# Patient Record
Sex: FEMALE | Race: WHITE | NOT HISPANIC OR LATINO | Employment: OTHER | URBAN - METROPOLITAN AREA
[De-identification: names, ages, dates, MRNs, and addresses within clinical notes are randomized per-mention and may not be internally consistent; named-entity substitution may affect disease eponyms.]

---

## 2022-04-12 ENCOUNTER — HOSPITAL ENCOUNTER (OUTPATIENT)
Facility: HOSPITAL | Age: 87
Setting detail: OBSERVATION
Discharge: HOME | End: 2022-04-13
Attending: EMERGENCY MEDICINE | Admitting: EMERGENCY MEDICINE

## 2022-04-12 ENCOUNTER — APPOINTMENT (OUTPATIENT)
Dept: GENERAL RADIOLOGY | Facility: HOSPITAL | Age: 87
End: 2022-04-12

## 2022-04-12 DIAGNOSIS — R06.02 SHORTNESS OF BREATH: Primary | ICD-10-CM

## 2022-04-12 LAB
ALBUMIN SERPL-MCNC: 4.4 G/DL (ref 3.5–5.2)
ALBUMIN/GLOB SERPL: 1.8 G/DL
ALP SERPL-CCNC: 71 U/L (ref 39–117)
ALT SERPL W P-5'-P-CCNC: 15 U/L (ref 1–33)
ANION GAP SERPL CALCULATED.3IONS-SCNC: 12 MMOL/L (ref 5–15)
APTT PPP: 27.9 SECONDS (ref 24–31)
AST SERPL-CCNC: 15 U/L (ref 1–32)
B PARAPERT DNA SPEC QL NAA+PROBE: NOT DETECTED
B PERT DNA SPEC QL NAA+PROBE: NOT DETECTED
BASOPHILS # BLD AUTO: 0 10*3/MM3 (ref 0–0.2)
BASOPHILS NFR BLD AUTO: 0.3 % (ref 0–1.5)
BILIRUB SERPL-MCNC: 0.8 MG/DL (ref 0–1.2)
BUN SERPL-MCNC: 22 MG/DL (ref 8–23)
BUN/CREAT SERPL: 27.5 (ref 7–25)
C PNEUM DNA NPH QL NAA+NON-PROBE: NOT DETECTED
CALCIUM SPEC-SCNC: 9.4 MG/DL (ref 8.6–10.5)
CHLORIDE SERPL-SCNC: 104 MMOL/L (ref 98–107)
CO2 SERPL-SCNC: 22 MMOL/L (ref 22–29)
CREAT SERPL-MCNC: 0.8 MG/DL (ref 0.57–1)
DEPRECATED RDW RBC AUTO: 50.3 FL (ref 37–54)
EGFRCR SERPLBLD CKD-EPI 2021: 72.8 ML/MIN/1.73
EOSINOPHIL # BLD AUTO: 0 10*3/MM3 (ref 0–0.4)
EOSINOPHIL NFR BLD AUTO: 0.2 % (ref 0.3–6.2)
ERYTHROCYTE [DISTWIDTH] IN BLOOD BY AUTOMATED COUNT: 15.2 % (ref 12.3–15.4)
FLUAV SUBTYP SPEC NAA+PROBE: NOT DETECTED
FLUBV RNA ISLT QL NAA+PROBE: NOT DETECTED
GIANT PLATELETS: NORMAL
GLOBULIN UR ELPH-MCNC: 2.5 GM/DL
GLUCOSE SERPL-MCNC: 97 MG/DL (ref 65–99)
HADV DNA SPEC NAA+PROBE: NOT DETECTED
HCOV 229E RNA SPEC QL NAA+PROBE: NOT DETECTED
HCOV HKU1 RNA SPEC QL NAA+PROBE: NOT DETECTED
HCOV NL63 RNA SPEC QL NAA+PROBE: NOT DETECTED
HCOV OC43 RNA SPEC QL NAA+PROBE: NOT DETECTED
HCT VFR BLD AUTO: 33 % (ref 34–46.6)
HGB BLD-MCNC: 11.4 G/DL (ref 12–15.9)
HMPV RNA NPH QL NAA+NON-PROBE: NOT DETECTED
HPIV1 RNA ISLT QL NAA+PROBE: NOT DETECTED
HPIV2 RNA SPEC QL NAA+PROBE: NOT DETECTED
HPIV3 RNA NPH QL NAA+PROBE: NOT DETECTED
HPIV4 P GENE NPH QL NAA+PROBE: NOT DETECTED
INR PPP: 1.03 (ref 0.93–1.1)
LARGE PLATELETS: NORMAL
LYMPHOCYTES # BLD AUTO: 1.4 10*3/MM3 (ref 0.7–3.1)
LYMPHOCYTES NFR BLD AUTO: 50 % (ref 19.6–45.3)
M PNEUMO IGG SER IA-ACNC: NOT DETECTED
MCH RBC QN AUTO: 32.9 PG (ref 26.6–33)
MCHC RBC AUTO-ENTMCNC: 34.4 G/DL (ref 31.5–35.7)
MCV RBC AUTO: 95.6 FL (ref 79–97)
MONOCYTES # BLD AUTO: 0.4 10*3/MM3 (ref 0.1–0.9)
MONOCYTES NFR BLD AUTO: 14.2 % (ref 5–12)
NEUTROPHILS NFR BLD AUTO: 1 10*3/MM3 (ref 1.7–7)
NEUTROPHILS NFR BLD AUTO: 35.3 % (ref 42.7–76)
NRBC BLD AUTO-RTO: 0.2 /100 WBC (ref 0–0.2)
NT-PROBNP SERPL-MCNC: 138.8 PG/ML (ref 0–1800)
PLATELET # BLD AUTO: 64 10*3/MM3 (ref 140–450)
PMV BLD AUTO: 12.2 FL (ref 6–12)
POTASSIUM SERPL-SCNC: 4.2 MMOL/L (ref 3.5–5.2)
PROT SERPL-MCNC: 6.9 G/DL (ref 6–8.5)
PROTHROMBIN TIME: 10.6 SECONDS (ref 9.6–11.7)
QT INTERVAL: 411 MS
RBC # BLD AUTO: 3.45 10*6/MM3 (ref 3.77–5.28)
RBC MORPH BLD: NORMAL
RHINOVIRUS RNA SPEC NAA+PROBE: NOT DETECTED
RSV RNA NPH QL NAA+NON-PROBE: NOT DETECTED
SARS-COV-2 RNA NPH QL NAA+NON-PROBE: NOT DETECTED
SARS-COV-2 RNA PNL SPEC NAA+PROBE: NOT DETECTED
SMALL PLATELETS BLD QL SMEAR: NORMAL
SODIUM SERPL-SCNC: 138 MMOL/L (ref 136–145)
TROPONIN T SERPL-MCNC: <0.01 NG/ML (ref 0–0.03)
WBC MORPH BLD: NORMAL
WBC NRBC COR # BLD: 2.8 10*3/MM3 (ref 3.4–10.8)

## 2022-04-12 PROCEDURE — C9803 HOPD COVID-19 SPEC COLLECT: HCPCS

## 2022-04-12 PROCEDURE — 93005 ELECTROCARDIOGRAM TRACING: CPT | Performed by: EMERGENCY MEDICINE

## 2022-04-12 PROCEDURE — G0378 HOSPITAL OBSERVATION PER HR: HCPCS

## 2022-04-12 PROCEDURE — 71045 X-RAY EXAM CHEST 1 VIEW: CPT

## 2022-04-12 PROCEDURE — U0003 INFECTIOUS AGENT DETECTION BY NUCLEIC ACID (DNA OR RNA); SEVERE ACUTE RESPIRATORY SYNDROME CORONAVIRUS 2 (SARS-COV-2) (CORONAVIRUS DISEASE [COVID-19]), AMPLIFIED PROBE TECHNIQUE, MAKING USE OF HIGH THROUGHPUT TECHNOLOGIES AS DESCRIBED BY CMS-2020-01-R: HCPCS | Performed by: EMERGENCY MEDICINE

## 2022-04-12 PROCEDURE — 83880 ASSAY OF NATRIURETIC PEPTIDE: CPT | Performed by: NURSE PRACTITIONER

## 2022-04-12 PROCEDURE — 85025 COMPLETE CBC W/AUTO DIFF WBC: CPT | Performed by: NURSE PRACTITIONER

## 2022-04-12 PROCEDURE — 85610 PROTHROMBIN TIME: CPT | Performed by: NURSE PRACTITIONER

## 2022-04-12 PROCEDURE — 84484 ASSAY OF TROPONIN QUANT: CPT | Performed by: NURSE PRACTITIONER

## 2022-04-12 PROCEDURE — 85007 BL SMEAR W/DIFF WBC COUNT: CPT | Performed by: NURSE PRACTITIONER

## 2022-04-12 PROCEDURE — 85730 THROMBOPLASTIN TIME PARTIAL: CPT | Performed by: NURSE PRACTITIONER

## 2022-04-12 PROCEDURE — 99284 EMERGENCY DEPT VISIT MOD MDM: CPT

## 2022-04-12 PROCEDURE — 0202U NFCT DS 22 TRGT SARS-COV-2: CPT | Performed by: NURSE PRACTITIONER

## 2022-04-12 PROCEDURE — U0005 INFEC AGEN DETEC AMPLI PROBE: HCPCS | Performed by: EMERGENCY MEDICINE

## 2022-04-12 PROCEDURE — 99204 OFFICE O/P NEW MOD 45 MIN: CPT | Performed by: INTERNAL MEDICINE

## 2022-04-12 PROCEDURE — 80053 COMPREHEN METABOLIC PANEL: CPT | Performed by: NURSE PRACTITIONER

## 2022-04-12 RX ORDER — ATORVASTATIN CALCIUM 20 MG/1
20 TABLET, FILM COATED ORAL NIGHTLY
Status: DISCONTINUED | OUTPATIENT
Start: 2022-04-12 | End: 2022-04-13 | Stop reason: HOSPADM

## 2022-04-12 RX ORDER — ATORVASTATIN CALCIUM 20 MG/1
20 TABLET, FILM COATED ORAL DAILY
COMMUNITY
End: 2022-11-28 | Stop reason: SDUPTHER

## 2022-04-12 RX ORDER — VALSARTAN 80 MG/1
40 TABLET ORAL DAILY
Status: DISCONTINUED | OUTPATIENT
Start: 2022-04-13 | End: 2022-04-13 | Stop reason: HOSPADM

## 2022-04-12 RX ORDER — CARVEDILOL 6.25 MG/1
6.25 TABLET ORAL 2 TIMES DAILY WITH MEALS
Status: DISCONTINUED | OUTPATIENT
Start: 2022-04-12 | End: 2022-04-13 | Stop reason: HOSPADM

## 2022-04-12 RX ORDER — CLOPIDOGREL BISULFATE 75 MG/1
300 TABLET ORAL ONCE
Status: COMPLETED | OUTPATIENT
Start: 2022-04-12 | End: 2022-04-12

## 2022-04-12 RX ORDER — SPIRONOLACTONE 25 MG/1
25 TABLET ORAL AS NEEDED
COMMUNITY
End: 2022-09-14 | Stop reason: SDUPTHER

## 2022-04-12 RX ORDER — FLUOROMETHOLONE 0.1 %
1 SUSPENSION, DROPS(FINAL DOSAGE FORM)(ML) OPHTHALMIC (EYE) 2 TIMES DAILY
COMMUNITY

## 2022-04-12 RX ORDER — LORATADINE 10 MG/1
10 TABLET ORAL DAILY
COMMUNITY

## 2022-04-12 RX ORDER — VALSARTAN 40 MG/1
40 TABLET ORAL DAILY
Status: ON HOLD | COMMUNITY
End: 2022-09-23

## 2022-04-12 RX ORDER — FLUTICASONE PROPIONATE 50 MCG
2 SPRAY, SUSPENSION (ML) NASAL DAILY
COMMUNITY

## 2022-04-12 RX ORDER — CLOPIDOGREL BISULFATE 75 MG/1
75 TABLET ORAL DAILY
Status: DISCONTINUED | OUTPATIENT
Start: 2022-04-13 | End: 2022-04-13 | Stop reason: HOSPADM

## 2022-04-12 RX ORDER — NITROGLYCERIN 0.4 MG/1
0.4 TABLET SUBLINGUAL
Status: DISCONTINUED | OUTPATIENT
Start: 2022-04-12 | End: 2022-04-13 | Stop reason: HOSPADM

## 2022-04-12 RX ORDER — CETIRIZINE HYDROCHLORIDE 10 MG/1
10 TABLET ORAL DAILY
Status: DISCONTINUED | OUTPATIENT
Start: 2022-04-13 | End: 2022-04-13 | Stop reason: HOSPADM

## 2022-04-12 RX ORDER — NITROGLYCERIN 0.4 MG/1
0.4 TABLET SUBLINGUAL
COMMUNITY

## 2022-04-12 RX ORDER — FLUOROMETHOLONE 0.1 %
1 SUSPENSION, DROPS(FINAL DOSAGE FORM)(ML) OPHTHALMIC (EYE) 2 TIMES DAILY
Status: DISCONTINUED | OUTPATIENT
Start: 2022-04-12 | End: 2022-04-13 | Stop reason: HOSPADM

## 2022-04-12 RX ORDER — ASPIRIN 81 MG/1
81 TABLET, CHEWABLE ORAL DAILY
Status: DISCONTINUED | OUTPATIENT
Start: 2022-04-13 | End: 2022-04-13 | Stop reason: HOSPADM

## 2022-04-12 RX ORDER — ISOSORBIDE MONONITRATE 30 MG/1
30 TABLET, EXTENDED RELEASE ORAL DAILY
COMMUNITY

## 2022-04-12 RX ORDER — CARVEDILOL 6.25 MG/1
6.25 TABLET ORAL 2 TIMES DAILY WITH MEALS
COMMUNITY
End: 2022-11-16 | Stop reason: SDUPTHER

## 2022-04-12 RX ORDER — ISOSORBIDE MONONITRATE 30 MG/1
30 TABLET, EXTENDED RELEASE ORAL DAILY
Status: DISCONTINUED | OUTPATIENT
Start: 2022-04-13 | End: 2022-04-13 | Stop reason: HOSPADM

## 2022-04-12 RX ORDER — ASPIRIN 81 MG/1
81 TABLET, CHEWABLE ORAL DAILY
COMMUNITY

## 2022-04-12 RX ORDER — SPIRONOLACTONE 25 MG/1
25 TABLET ORAL DAILY
Status: DISCONTINUED | OUTPATIENT
Start: 2022-04-13 | End: 2022-04-13 | Stop reason: HOSPADM

## 2022-04-12 RX ORDER — SODIUM CHLORIDE 0.9 % (FLUSH) 0.9 %
10 SYRINGE (ML) INJECTION AS NEEDED
Status: DISCONTINUED | OUTPATIENT
Start: 2022-04-12 | End: 2022-04-13 | Stop reason: HOSPADM

## 2022-04-12 RX ADMIN — CARVEDILOL 6.25 MG: 6.25 TABLET, FILM COATED ORAL at 18:28

## 2022-04-12 RX ADMIN — FLUOROMETHOLONE 1 DROP: 1 SOLUTION/ DROPS OPHTHALMIC at 20:17

## 2022-04-12 RX ADMIN — ATORVASTATIN CALCIUM 20 MG: 20 TABLET, FILM COATED ORAL at 20:17

## 2022-04-12 RX ADMIN — CLOPIDOGREL BISULFATE 300 MG: 75 TABLET ORAL at 16:13

## 2022-04-12 NOTE — H&P
[facility] Observation Unit H&P    Patient Name: Tricia Marroquin  : 1935  MRN: [id number]  Primary Care Physician: Danish Herbert MD  Date of admission: 2022     Patient Care Team:  Danish Herbert MD as PCP - General (Family Medicine)          Subjective   History Present Illness     Chief Complaint:   Chief Complaint   Patient presents with   • Shortness of Breath     Pt c/o SOA, heart palpitations, pt had 2 cardiac stents recently placed-January.         Ms. Marroquin is a 86 y.o.  presents to [facility] complaining of shortness of breath.      86-year-old female presents to the ER with a chief complaint of shortness of breath, especially with exertion, and lying supine when she is trying to go to sleep at night.  The patient does report mild lower extremity edema when up and about for long periods, however, the edema usually resolves by morning time.  Patient denies any recent cough or noted wheezing.  The patient is a former smoker with 40-pack-year history quitting smoking in .  She states she has never been diagnosed with chronic lung disease.    Review of records with summary, records from [facility][1[facility]: Cardiac catheterization 2022 showing EF 60%, left main artery distal 20% taper, LAD artery the left is a moderate size vessel that wraps around the apex.  The mid segment is 50 to 60% stenosed, left circumflex artery is a nondominant vessel and stent was patent.  Otherwise has luminal irregularities.  Right coronary artery is a large dominant vessel diffusely calcified 50 to 60%, just prior to the distal RCA stent there was a 99% stenosis.  She is allergic to aspirin.  Stent to the left circumflex 2016; 9/3/2021 PCI and stent to the mid RCA with drug-eluting stent. Aortofemoral bypass 10/5/2007, right common femoral artery arthrectomy with patch angioplasty 2008.  Platelet count on 3/7/2022 was 104 and 96 on 2022,  hemoglobin 11.7, creatinine 0.81, BUN 16, sodium 139, potassium 4.7      Review of Systems   Constitutional: Negative for chills, diaphoresis and fever.   Cardiovascular: Positive for dyspnea on exertion, leg swelling and palpitations. Negative for chest pain.   Respiratory: Positive for shortness of breath. Negative for cough and wheezing.    Gastrointestinal: Negative for abdominal pain and nausea.   All other systems reviewed and are negative.          Personal History     Past Medical History:   Past Medical History:   Diagnosis Date   • Hypertension        Surgical History:      Past Surgical History:   Procedure Laterality Date   • AORTA SURGERY     • BACK SURGERY     • CORONARY ANGIOPLASTY WITH STENT PLACEMENT  01/06/2022           Family History: family history is not on file. Otherwise pertinent FHx was reviewed and unremarkable.     Social History:  reports that she has quit smoking. She has never used smokeless tobacco. She reports that she does not drink alcohol and does not use drugs.      Medications:  Prior to Admission medications    Medication Sig Start Date End Date Taking? Authorizing Provider   aspirin 81 MG chewable tablet Chew 81 mg Daily.   Yes Denise Groves MD   atorvastatin (LIPITOR) 20 MG tablet Take 20 mg by mouth Daily.   Yes Denise Groves MD   carvedilol (COREG) 6.25 MG tablet Take 6.25 mg by mouth 2 (Two) Times a Day With Meals.   Yes Denise Groves MD   fluorometholone (FML) 0.1 % ophthalmic suspension Administer 1 drop to both eyes 2 (Two) Times a Day.   Yes Denise Groves MD   fluticasone (FLONASE) 50 MCG/ACT nasal spray 2 sprays into the nostril(s) as directed by provider Daily.   Yes Denise Groves MD   isosorbide mononitrate (IMDUR) 30 MG 24 hr tablet Take 30 mg by mouth Daily.   Yes Denise Groves MD   loratadine (CLARITIN) 10 MG tablet Take 10 mg by mouth Daily.   Yes Denise Groves MD   Multiple Vitamins-Minerals (Viteyes AREDS  "Formula) capsule Take  by mouth.   Yes Denise Groves MD   spironolactone (ALDACTONE) 25 MG tablet Take 25 mg by mouth Daily.   Yes Denise Groves MD   ticagrelor (BRILINTA) 90 MG tablet tablet Take 90 mg by mouth 2 (Two) Times a Day.   Yes Denise Groves MD   valsartan (DIOVAN) 40 MG tablet Take 40 mg by mouth Daily.   Yes Denise Groves MD   nitroglycerin (NITROSTAT) 0.4 MG SL tablet Place 0.4 mg under the tongue Every 5 (Five) Minutes As Needed for Chest Pain. Take no more than 3 doses in 15 minutes.    ProviderDenise MD       Allergies:    Allergies   Allergen Reactions   • Aspirin Other (See Comments)     Pt reports \"makes my heart go crazy\"   • Codeine GI Intolerance   • Penicillins Hives       Objective   Objective     Vital Signs  Temp:  [97.6 °F (36.4 °C)-98 °F (36.7 °C)] 98 °F (36.7 °C)  Heart Rate:  [53-66] 56  Resp:  [19] 19  BP: (139-175)/(46-66) 158/63  SpO2:  [97 %-99 %] 97 %  on   ;   Device (Oxygen Therapy): room air  Body mass index is 31.14 kg/m².    Physical Exam  Vitals and nursing note reviewed.   Constitutional:       Appearance: Normal appearance.   HENT:      Head: Normocephalic and atraumatic.      Right Ear: External ear normal.      Left Ear: External ear normal.      Nose: Nose normal.      Mouth/Throat:      Mouth: Mucous membranes are moist.   Eyes:      General: No scleral icterus.        Right eye: No discharge.         Left eye: No discharge.      Extraocular Movements: Extraocular movements intact.      Conjunctiva/sclera: Conjunctivae normal.      Pupils: Pupils are equal, round, and reactive to light.   Cardiovascular:      Rate and Rhythm: Normal rate and regular rhythm.      Pulses: Normal pulses.      Heart sounds: Normal heart sounds.   Pulmonary:      Effort: Pulmonary effort is normal.      Breath sounds: Normal breath sounds.   Abdominal:      General: Bowel sounds are normal.      Palpations: Abdomen is soft.   Musculoskeletal:         " General: Normal range of motion.      Cervical back: Normal range of motion and neck supple.      Right lower leg: No edema.      Left lower leg: No edema.   Skin:     General: Skin is warm and dry.      Capillary Refill: Capillary refill takes less than 2 seconds.   Neurological:      General: No focal deficit present.      Mental Status: She is alert and oriented to person, place, and time.   Psychiatric:         Mood and Affect: Mood normal.         Behavior: Behavior normal.         Thought Content: Thought content normal.         Judgment: Judgment normal.           Results Review:  I have personally reviewed most recent cardiac tracings, lab results and radiology images and interpretations and agree with findings.    Results from last 7 days   Lab Units 04/12/22  1057   WBC 10*3/mm3 2.80*   HEMOGLOBIN g/dL 11.4*   HEMATOCRIT % 33.0*   PLATELETS 10*3/mm3 64*   INR  1.03     Results from last 7 days   Lab Units 04/12/22  1057   SODIUM mmol/L 138   POTASSIUM mmol/L 4.2   CHLORIDE mmol/L 104   CO2 mmol/L 22.0   BUN mg/dL 22   CREATININE mg/dL 0.80   GLUCOSE mg/dL 97   CALCIUM mg/dL 9.4   ALT (SGPT) U/L 15   AST (SGOT) U/L 15   TROPONIN T ng/mL <0.010   PROBNP pg/mL 138.8     Estimated Creatinine Clearance: 48.4 mL/min (by C-G formula based on SCr of 0.8 mg/dL).  Brief Urine Lab Results     None          Microbiology Results (last 10 days)     Procedure Component Value - Date/Time    COVID PRE-OP / PRE-PROCEDURE SCREENING ORDER (NO ISOLATION) - Swab, Nasopharynx [[id number]]  (Normal) Collected: 04/12/22 1332    Lab Status: Final result Specimen: Swab from Nasopharynx Updated: 04/12/22 1414    Narrative:      The following orders were created for panel order COVID PRE-OP / PRE-PROCEDURE SCREENING ORDER (NO ISOLATION) - Swab, Nasopharynx.  Procedure                               Abnormality         Status                     ---------                               -----------         ------                      COVID-19,CEPHEID/LORI,CO...[[id number]]  Normal              Final result                 Please view results for these tests on the individual orders.    COVID-19,CEPHEID/LORI,COR/KRYSTINA/PAD/[facility] IN-HOUSE(OR EMERGENT/ADD-ON),NP SWAB IN TRANSPORT MEDIA 3-4 HR TAT, RT-PCR - Swab, Nasopharynx [[id number]]  (Normal) Collected: 04/12/22 1332    Lab Status: Final result Specimen: Swab from Nasopharynx Updated: 04/12/22 1414     COVID19 Not Detected    Narrative:      Fact sheet for providers: https://www.fda.gov/media/499736/download     Fact sheet for patients: https://www.fda.gov/media/133415/download  Fact sheet for providers: https://www.fda.gov/media/204742/download     Fact sheet for patients: https://www.fda.gov/media/080812/download          ECG/EMG Results (most recent)     Procedure Component Value Units Date/Time    ECG 12 Lead [[id number]] Collected: 04/12/22 1044     Updated: 04/12/22 1046     QT Interval 411 ms     Narrative:      HEART RATE= 57  bpm  RR Interval= 1044  ms  TX Interval= 191  ms  P Horizontal Axis= 38  deg  P Front Axis= 6  deg  QRSD Interval= 96  ms  QT Interval= 411  ms  QRS Axis= 59  deg  T Wave Axis= 36  deg  - OTHERWISE NORMAL ECG -  Sinus bradycardia  No previous ECG available for comparison  Electronically Signed By:   Date and Time of Study: 2022-04-12 10:44:17                  XR Chest 1 View    Result Date: 4/12/2022  No acute process.  Electronically Signed By-Aditya Fraser MD On:4/12/2022 11:16 AM This report was finalized on [id number] by  Aditya Fraser MD.        Estimated Creatinine Clearance: 48.4 mL/min (by C-G formula based on SCr of 0.8 mg/dL).    Assessment/Plan   Assessment/Plan       Active Hospital Problems    Diagnosis  POA   • Shortness of breath [R06.02]  Yes      Resolved Hospital Problems   No resolved problems to display.     Shortness of breath, uncertain etiology--cardiac cause needs to be considered: Serial troponin; cardiology consult; stop Brilinta per  cardiology recommendation; add Plavix loading dose 300 mg followed by 75 mg daily    CAD, chronic with recent stent placement in September 2021 and February 2022: Add Plavix; discontinue Brilinta; continue aspirin  -patient reports she has an aspirin allergy but can tolerate a baby aspirin daily and has been taking 1 since her most recent stent placement    Pancytopenia with thrombocytopenia, marked, platelets 64: Repeat CBC in a.m.  -CBC 2.80, platelets 64, hemoglobin 11.7  -Review of records showing Platelet count on 3/7/2022 was 104 and 96 on 2/18/2022 at [facility]    HLD, chronic: Continue atorvastatin    CAD, chronic: Continue Coreg; continue Imdur; continue lisinopril; hold Brilinta; add Plavix per cardiology recommendation    Allergies, chronic: Continue Claritin    Hypertension, chronic with lower extremity edema: Continue valsartan; continue Imdur; continue lisinopril; continue Coreg      VTE Prophylaxis -   Mechanical Order History:     None      Pharmalogical Order History:      Ordered     Dose Route Frequency Stop    04/12/22 1550  enoxaparin (LOVENOX) syringe 40 mg         40 mg SC Every 24 Hours --                CODE STATUS:    Code Status and Medical Interventions:   Ordered at: 04/12/22 1550     Code Status (Patient has no pulse and is not breathing):    CPR (Attempt to Resuscitate)     Medical Interventions (Patient has pulse or is breathing):    Full Support       This patient has been examined wearing personal protective equipment.     I discussed the patient's findings and my recommendations with patient and nursing staff.      Signature:Electronically signed by [facility] Fajardo, 04/12/22, 4:28 PM EDT.

## 2022-04-12 NOTE — CASE MANAGEMENT/SOCIAL WORK
Discharge Planning Assessment  [facility]     Patient Name: Tricia Marroquin  MRN: [id number]  Today's Date: 4/12/2022    Admit Date: 4/12/2022     Discharge Needs Assessment     Row Name 04/12/22 1341       Discharge Needs Assessment    Current Outpatient/Agency/Support Group --  appointment with [facility] to seek assistance with meals and housekeeping    Row Name 04/12/22 1339       Living Environment    People in Home alone    Current Living Arrangements home    Primary Care Provided by self    Provides Primary Care For no one    Family Caregiver if Needed child(dianna), adult    Family Caregiver Names David    Quality of Family Relationships supportive    Able to Return to Prior Arrangements yes       Resource/Environmental Concerns    Transportation Concerns none       Transition Planning    Patient/Family Anticipates Transition to home    Transportation Anticipated family or friend will provide  David       Discharge Needs Assessment    Readmission Within the Last 30 Days no previous admission in last 30 days    Equipment Currently Used at Home rollator;cane, straight    Concerns to be Addressed no discharge needs identified               Discharge Plan     Row Name 04/12/22 5059       Plan    Plan DC Plan:Return home ,son able to assist.    Plan Comments Spoke with patient at bedside, lives in own home alone. Son and daughter live nearby and able to assist as needed. Established PCP and Pharmacy. Working with [facility] to see if elgible for meal assistance and light housekeeping. Son able to take home from hospital. Denies needs.            Demographic Summary     Row Name 04/12/22 1336       General Information    Admission Type observation    Arrived From emergency department    Referral Source emergency department    Reason for Consult discharge planning    Preferred Language English               Functional Status     Row Name 04/12/22 1338       Functional Status    Usual Activity  Tolerance moderate    Current Activity Tolerance moderate       Functional Status, IADL    Medications independent    Meal Preparation independent    Housekeeping assistive equipment    Laundry independent    Shopping assistive equipment       Mental Status    General Appearance WDL WDL            Met with patient in room wearing PPE: mask, face shield/goggles, gloves, gown.      Maintained distance greater than six feet and spent less than 15 minutes in the room.      Ann Piper RN

## 2022-04-12 NOTE — ED NOTES
Pt c/o intermittent SOA with heart palpitations since having cardiac stents placed in January. Pt states it is just getting worse and shes not able to sleep well.

## 2022-04-12 NOTE — CONSULTS
Cardiology Consult Note    Patient Identification:  Name: Tricia Marroquin  Age: 86 y.o.  Sex: female  :  1935  MRN: [id number]             Requesting Physician : Perla Deleon    Reason for Consultation / Chief Complaint : Shortness of breath, CAD, PCI    History of Present Illness:      Ms. Tricia Marroquin has PMH of    CAD, stent to LCx 2016, NSTEMI 9/3/2022 and PCI to RCA, PCI 2022  Hypertension  Dyslipidemia  PAD, aortobifem 10/5/2007, right common femoral arthrectomy with patch angioplasty 2008  Thrombocytopenia  JELENA, back surgery, aorta surgery  Former smoker quit in   Allergy to penicillin  Allergy to aspirin but tolerates baby aspirin        Presented through emergency room 2022 with progressively worsening shortness of breath and PND not able to lie supine to sleep at nighttime and mild lower extremity edema.  Denies any chest pain.  Patient work-up here revealed normal troponin, normal proBNP at 138.  Normal CMP, PT PTT.  CBC with low white count at 2.8, hemoglobin at 11.4, platelet count of 64  Chest x-ray 2022 reveals no acute cardiopulmonary abnormality.  EKG reviewed by me from 2022 reveals sinus rhythm with sinus bradycardia at the rate of 57 bpm        Assessment:  :    Shortness of breath, dyspnea on exertion, orthopnea  CAD, MI, PCI  PAD history of surgery  Hypertension  Dyslipidemia  Former smoker  Thrombocytopenia        Recommendations / Plan:        Patient says she has allergy to aspirin but tolerates baby aspirin okay.  Patient is having shortness of breath which could be potentially from Brilinta.  We will change her to Plavix after loading.  Patient had developed stenosis just before the previous RCA stent requiring repeat stenting.  Consult hematology for thrombocytopenia evaluation and treatment.  Will schedule stress Cardiolite to evaluate for ischemia as a cause of her dyspnea.  We will follow along and consider further evaluation and  "treatment.  Discussed with nurse practitioner Perla Deleon.  Reviewed [facility] from [facility] and summarized in HPI.             Diagnosis Plan   1. Shortness of breath                Past Medical History:  Past Medical History:   Diagnosis Date   • Hypertension      Past Surgical History:  Past Surgical History:   Procedure Laterality Date   • AORTA SURGERY     • BACK SURGERY     • CORONARY ANGIOPLASTY WITH STENT PLACEMENT  01/06/2022      Allergies:  Allergies   Allergen Reactions   • Aspirin Other (See Comments)     Pt reports \"makes my heart go crazy\"   • Codeine GI Intolerance   • Penicillins Hives     Home Meds:  Medications Prior to Admission   Medication Sig Dispense Refill Last Dose   • aspirin 81 MG chewable tablet Chew 81 mg Daily.   4/12/2022 at Unknown time   • atorvastatin (LIPITOR) 20 MG tablet Take 20 mg by mouth Daily.   4/11/2022 at Unknown time   • carvedilol (COREG) 6.25 MG tablet Take 6.25 mg by mouth 2 (Two) Times a Day With Meals.   4/12/2022 at Unknown time   • fluorometholone (FML) 0.1 % ophthalmic suspension Administer 1 drop to both eyes 2 (Two) Times a Day.   4/12/2022 at Unknown time   • fluticasone (FLONASE) 50 MCG/ACT nasal spray 2 sprays into the nostril(s) as directed by provider Daily.   4/12/2022 at Unknown time   • isosorbide mononitrate (IMDUR) 30 MG 24 hr tablet Take 30 mg by mouth Daily.   4/12/2022 at Unknown time   • loratadine (CLARITIN) 10 MG tablet Take 10 mg by mouth Daily.   4/12/2022 at Unknown time   • Multiple Vitamins-Minerals (Viteyes AREDS Formula) capsule Take  by mouth.   4/12/2022 at Unknown time   • spironolactone (ALDACTONE) 25 MG tablet Take 25 mg by mouth Daily.   4/12/2022 at Unknown time   • ticagrelor (BRILINTA) 90 MG tablet tablet Take 90 mg by mouth 2 (Two) Times a Day.   4/12/2022 at Unknown time   • valsartan (DIOVAN) 40 MG tablet Take 40 mg by mouth Daily.   4/12/2022 at Unknown time   • nitroglycerin (NITROSTAT) 0.4 MG SL tablet Place 0.4 mg " under the tongue Every 5 (Five) Minutes As Needed for Chest Pain. Take no more than 3 doses in 15 minutes.   More than a month at Unknown time     Current Meds:     Current Facility-Administered Medications:   •  [START ON 4/13/2022] aspirin chewable tablet 81 mg, 81 mg, Oral, Daily, Perla Deleon, [facility]  •  atorvastatin (LIPITOR) tablet 20 mg, 20 mg, Oral, Nightly, Perla Deleon APRN, 20 mg at 04/12/22 2017  •  carvedilol (COREG) tablet 6.25 mg, 6.25 mg, Oral, BID With Meals, Perla Deleon APRN, 6.25 mg at 04/12/22 1828  •  [START ON 4/13/2022] cetirizine (zyrTEC) tablet 10 mg, 10 mg, Oral, Daily, Perla Deleon, [facility]  •  [START ON 4/13/2022] clopidogrel (PLAVIX) tablet 75 mg, 75 mg, Oral, Daily, Perla Deleon APRN  •  fluorometholone (FML) 0.1 % ophthalmic suspension 1 drop, 1 drop, Both Eyes, BID, Perla Deleon APRN, 1 drop at 04/12/22 2017  •  [START ON 4/13/2022] isosorbide mononitrate (IMDUR) 24 hr tablet 30 mg, 30 mg, Oral, Daily, Perla Deleon, [facility]  •  nitroglycerin (NITROSTAT) SL tablet 0.4 mg, 0.4 mg, Sublingual, Q5 Min PRN, Perla Deleon APRN  •  [COMPLETED] Insert peripheral IV, , , Once **AND** sodium chloride 0.9 % flush 10 mL, 10 mL, Intravenous, PRN, Rasheeda Haque, APRN  •  [START ON 4/13/2022] spironolactone (ALDACTONE) tablet 25 mg, 25 mg, Oral, Daily, Perla Deleon, [facility]  •  [START ON 4/13/2022] valsartan (DIOVAN) tablet 40 mg, 40 mg, Oral, Daily, Perla Deleon, APRN  Social History:   Social History     Tobacco Use   • Smoking status: Former Smoker   • Smokeless tobacco: Never Used   Substance Use Topics   • Alcohol use: Never      Family History:  History reviewed. No pertinent family history.     Review of Systems : Review of Systems   Constitutional: Positive for malaise/fatigue.   Cardiovascular: Positive for orthopnea.   Respiratory: Positive for shortness of breath.    All other systems reviewed and are negative.             Constitutional:  Temp:  [97.6 °F (36.4 °C)-98.3 °F (36.8 °C)] 98.3 °F (36.8  "°C)  Heart Rate:  [53-68] 68  Resp:  [19] 19  BP: (139-175)/(46-67) 146/67    Physical Exam   /67 (BP Location: Right arm, Patient Position: Lying)   Pulse 68   Temp 98.3 °F (36.8 °C) (Oral)   Resp 19   Ht 154.9 cm (61\")   Wt 74.8 kg (164 lb 12.8 oz)   SpO2 98%   BMI 31.14 kg/m²   Physical Exam  General:  Appears in no acute distress  Eyes: Sclera is anicteric,  conjunctiva is clear   HEENT:  No JVD. Thyroid not visibly enlarged. No mucosal pallor or cyanosis  Respiratory: Respirations regular and unlabored at rest.  Clear to auscultation  Cardiovascular: S1,S2 Regular rate and rhythm. No murmur, rub or gallop auscultated. No pretibial pitting edema  Gastrointestinal: Abdomen soft, flat, non tender. Bowel sounds present.   Musculoskeletal:  No abnormal movements  Extremities: No digital clubbing or cyanosis  Skin: Color pink. Skin warm and dry to touch. No rashes  No xanthoma  Neuro: Alert and awake, no lateralizing deficits appreciated    Cardiographics  ECG: EKG tracing was  personally reviewed/interpreted by me  ECG 12 Lead   Preliminary Result   HEART RATE= 57  bpm   RR Interval= 1044  ms   W[place] Interval= 191  ms   P Horizontal Axis= 38  deg   P Front Axis= 6  deg   QRSD Interval= 96  ms   QT Interval= 411  ms   QRS Axis= 59  deg   T Wave Axis= 36  deg   - OTHERWISE NORMAL ECG -   Sinus bradycardia   No previous ECG available for comparison   Electronically Signed By:    Date and Time of Study: 2022-04-12 10:44:17          Telemetry: Sinus rhythm    Echocardiogram:       Imaging  Chest X-ray:   Imaging Results (Last 24 Hours)     Procedure Component Value Units Date/Time    XR Chest 1 View [[id number]] Collected: 04/12/22 1116     Updated: 04/12/22 1118    Narrative:         DATE OF EXAM:   4/12/2022 11:10 AM     PROCEDURE:   XR CHEST 1 VW-     INDICATIONS:   soa; R06.02-Shortness of breath     COMPARISON:  No Comparisons Available     TECHNIQUE:   Portable chest radiograph.     FINDINGS:    The " cardiomediastinal silhouette is within normal limits. The lungs are  clear. There is no pneumothorax, focal consolidation, or large pleural  effusion. Osseous structures grossly intact.        Impression:      No acute process.      Electronically Signed By-Aditya Fraser MD On:4/12/2022 11:16 AM  This report was finalized on [id number] by  Aditya Fraser MD.          Lab Review: I have reviewed the labs  Results from last 7 days   Lab Units 04/12/22  1057   TROPONIN T ng/mL <0.010         Results from last 7 days   Lab Units 04/12/22  1057   SODIUM mmol/L 138   POTASSIUM mmol/L 4.2   BUN mg/dL 22   CREATININE mg/dL 0.80   CALCIUM mg/dL 9.4     @[id number]@  Results from last 7 days   Lab Units 04/12/22  1057   WBC 10*3/mm3 2.80*   HEMOGLOBIN g/dL 11.4*   HEMATOCRIT % 33.0*   PLATELETS 10*3/mm3 64*     Results from last 7 days   Lab Units 04/12/22  1057   INR  1.03   APTT seconds 27.9             Rodriguez Glass MD  4/12/2022, 20:47 EDT      EMR Dragon/Transcription:   Dictated utilizing Dragon dictation  Much of the above report is an electronic transcription/translation of the spoken language to printed text using Dragon Software. As such, the subtleties and finesse of the spoken language may permit erroneous, or at times, nonsensical words or phrases to be inadvertently transcribed; thus changes may be made at a later date to rectify these errors.

## 2022-04-12 NOTE — PLAN OF CARE
Problem: Adult Inpatient Plan of Care  Goal: Plan of Care Review  Outcome: Ongoing, Progressing  Flowsheets (Taken 4/12/2022 1621)  Progress: no change  Plan of Care Reviewed With: patient  Outcome Evaluation: new admit for shortness of breath.  None at this time  Cardiology consult.  VSS  Will continue to monitor  Goal: Patient-Specific Goal (Individualized)  Outcome: Ongoing, Progressing  Goal: Absence of Hospital-Acquired Illness or Injury  Outcome: Ongoing, Progressing  Goal: Optimal Comfort and Wellbeing  Outcome: Ongoing, Progressing  Goal: Readiness for Transition of Care  Outcome: Ongoing, Progressing  Intervention: Mutually Develop Transition Plan  Recent Flowsheet Documentation  Taken 4/12/2022 1619 by Angelita Romero, RN  Transportation Anticipated: family or friend will provide  Patient/Family Anticipated Services at Transition: none  Patient/Family Anticipates Transition to: home  Taken 4/12/2022 1541 by Angelita Romero, RN  Transportation Anticipated: family or friend will provide  Patient/Family Anticipates Transition to: home  Taken 4/12/2022 1537 by Angelita Romero, RN  Equipment Currently Used at Home: cane, straight     Problem: Fall Injury Risk  Goal: Absence of Fall and Fall-Related Injury  Outcome: Ongoing, Progressing     Problem: Hypertension Comorbidity  Goal: Blood Pressure in Desired Range  Outcome: Ongoing, Progressing   Goal Outcome Evaluation:  Plan of Care Reviewed With: patient        Progress: no change  Outcome Evaluation: new admit for shortness of breath.  None at this time  Cardiology consult.  VSS  Will continue to monitor

## 2022-04-13 ENCOUNTER — APPOINTMENT (OUTPATIENT)
Dept: NUCLEAR MEDICINE | Facility: HOSPITAL | Age: 87
End: 2022-04-13

## 2022-04-13 VITALS
RESPIRATION RATE: 16 BRPM | OXYGEN SATURATION: 96 % | SYSTOLIC BLOOD PRESSURE: 142 MMHG | DIASTOLIC BLOOD PRESSURE: 50 MMHG | HEART RATE: 66 BPM | TEMPERATURE: 98.9 F | BODY MASS INDEX: 31.11 KG/M2 | HEIGHT: 61 IN | WEIGHT: 164.8 LBS

## 2022-04-13 DIAGNOSIS — R06.09 DYSPNEA ON EXERTION: Primary | ICD-10-CM

## 2022-04-13 LAB
ANION GAP SERPL CALCULATED.3IONS-SCNC: 13 MMOL/L (ref 5–15)
BH CV NUCLEAR PRIOR STUDY: 3
BH CV REST NUCLEAR ISOTOPE DOSE: 7.8 MCI
BH CV STRESS BP STAGE 2: NORMAL
BH CV STRESS BP STAGE 4: NORMAL
BH CV STRESS COMMENTS STAGE 1: NORMAL
BH CV STRESS COMMENTS STAGE 2: NORMAL
BH CV STRESS DOSE REGADENOSON STAGE 1: 0.4
BH CV STRESS DURATION MIN STAGE 1: 0
BH CV STRESS DURATION MIN STAGE 2: 4
BH CV STRESS DURATION SEC STAGE 1: 10
BH CV STRESS DURATION SEC STAGE 2: 0
BH CV STRESS HR STAGE 1: 70
BH CV STRESS HR STAGE 2: 92
BH CV STRESS HR STAGE 3: 84
BH CV STRESS HR STAGE 4: 76
BH CV STRESS NUCLEAR ISOTOPE DOSE: 21.3 MCI
BH CV STRESS PROTOCOL 1: NORMAL
BH CV STRESS RECOVERY BP: NORMAL MMHG
BH CV STRESS RECOVERY HR: 71 BPM
BH CV STRESS STAGE 1: 1
BH CV STRESS STAGE 2: 2
BH CV STRESS STAGE 3: 3
BH CV STRESS STAGE 4: 4
BUN SERPL-MCNC: 22 MG/DL (ref 8–23)
BUN/CREAT SERPL: 27.2 (ref 7–25)
CALCIUM SPEC-SCNC: 9.1 MG/DL (ref 8.6–10.5)
CHLORIDE SERPL-SCNC: 105 MMOL/L (ref 98–107)
CO2 SERPL-SCNC: 22 MMOL/L (ref 22–29)
CREAT SERPL-MCNC: 0.81 MG/DL (ref 0.57–1)
DEPRECATED RDW RBC AUTO: 50.8 FL (ref 37–54)
EGFRCR SERPLBLD CKD-EPI 2021: 71.7 ML/MIN/1.73
ERYTHROCYTE [DISTWIDTH] IN BLOOD BY AUTOMATED COUNT: 15.3 % (ref 12.3–15.4)
GLUCOSE SERPL-MCNC: 92 MG/DL (ref 65–99)
HCT VFR BLD AUTO: 33 % (ref 34–46.6)
HGB BLD-MCNC: 11.5 G/DL (ref 12–15.9)
LDH SERPL-CCNC: 197 U/L (ref 135–214)
LYMPHOCYTES # BLD MANUAL: 1.2 10*3/MM3 (ref 0.7–3.1)
LYMPHOCYTES NFR BLD MANUAL: 14 % (ref 5–12)
MAXIMAL PREDICTED HEART RATE: 136 BPM
MCH RBC QN AUTO: 32.8 PG (ref 26.6–33)
MCHC RBC AUTO-ENTMCNC: 34.8 G/DL (ref 31.5–35.7)
MCV RBC AUTO: 94.2 FL (ref 79–97)
MONOCYTES # BLD: 0.39 10*3/MM3 (ref 0.1–0.9)
NEUTROPHILS # BLD AUTO: 1.2 10*3/MM3 (ref 1.7–7)
NEUTROPHILS NFR BLD MANUAL: 42 % (ref 42.7–76)
NEUTS BAND NFR BLD MANUAL: 1 % (ref 0–5)
OVALOCYTES BLD QL SMEAR: ABNORMAL
PERCENT MAX PREDICTED HR: 72.79 %
PLAT MORPH BLD: NORMAL
PLATELET # BLD AUTO: 59 10*3/MM3 (ref 140–450)
PMV BLD AUTO: 11.2 FL (ref 6–12)
POTASSIUM SERPL-SCNC: 4.4 MMOL/L (ref 3.5–5.2)
RBC # BLD AUTO: 3.5 10*6/MM3 (ref 3.77–5.28)
RETICS # AUTO: 0.05 10*6/MM3 (ref 0.02–0.13)
RETICS/RBC NFR AUTO: 1.4 % (ref 0.7–1.9)
SCAN SLIDE: NORMAL
SODIUM SERPL-SCNC: 140 MMOL/L (ref 136–145)
STRESS BASELINE BP: NORMAL MMHG
STRESS BASELINE HR: 57 BPM
STRESS PERCENT HR: 86 %
STRESS POST PEAK BP: NORMAL MMHG
STRESS POST PEAK HR: 99 BPM
STRESS TARGET HR: 116 BPM
TROPONIN T SERPL-MCNC: <0.01 NG/ML (ref 0–0.03)
TSH SERPL DL<=0.05 MIU/L-ACNC: 1.83 UIU/ML (ref 0.27–4.2)
VARIANT LYMPHS NFR BLD MANUAL: 1 % (ref 0–5)
VARIANT LYMPHS NFR BLD MANUAL: 42 % (ref 19.6–45.3)
WBC MORPH BLD: NORMAL
WBC NRBC COR # BLD: 2.8 10*3/MM3 (ref 3.4–10.8)

## 2022-04-13 PROCEDURE — 84484 ASSAY OF TROPONIN QUANT: CPT | Performed by: EMERGENCY MEDICINE

## 2022-04-13 PROCEDURE — 93017 CV STRESS TEST TRACING ONLY: CPT

## 2022-04-13 PROCEDURE — 84443 ASSAY THYROID STIM HORMONE: CPT | Performed by: PHYSICIAN ASSISTANT

## 2022-04-13 PROCEDURE — 85045 AUTOMATED RETICULOCYTE COUNT: CPT | Performed by: PHYSICIAN ASSISTANT

## 2022-04-13 PROCEDURE — 99214 OFFICE O/P EST MOD 30 MIN: CPT | Performed by: INTERNAL MEDICINE

## 2022-04-13 PROCEDURE — 78452 HT MUSCLE IMAGE SPECT MULT: CPT | Performed by: INTERNAL MEDICINE

## 2022-04-13 PROCEDURE — 85007 BL SMEAR W/DIFF WBC COUNT: CPT | Performed by: PHYSICIAN ASSISTANT

## 2022-04-13 PROCEDURE — G0378 HOSPITAL OBSERVATION PER HR: HCPCS

## 2022-04-13 PROCEDURE — 25010000002 REGADENOSON 0.4 MG/5ML SOLUTION: Performed by: EMERGENCY MEDICINE

## 2022-04-13 PROCEDURE — 83615 LACTATE (LD) (LDH) ENZYME: CPT | Performed by: PHYSICIAN ASSISTANT

## 2022-04-13 PROCEDURE — 83010 ASSAY OF HAPTOGLOBIN QUANT: CPT | Performed by: PHYSICIAN ASSISTANT

## 2022-04-13 PROCEDURE — 80048 BASIC METABOLIC PNL TOTAL CA: CPT | Performed by: PHYSICIAN ASSISTANT

## 2022-04-13 PROCEDURE — A9502 TC99M TETROFOSMIN: HCPCS | Performed by: EMERGENCY MEDICINE

## 2022-04-13 PROCEDURE — 85025 COMPLETE CBC W/AUTO DIFF WBC: CPT | Performed by: PHYSICIAN ASSISTANT

## 2022-04-13 PROCEDURE — 93018 CV STRESS TEST I&R ONLY: CPT | Performed by: INTERNAL MEDICINE

## 2022-04-13 PROCEDURE — 78452 HT MUSCLE IMAGE SPECT MULT: CPT

## 2022-04-13 PROCEDURE — 0 TECHNETIUM TETROFOSMIN KIT: Performed by: EMERGENCY MEDICINE

## 2022-04-13 RX ORDER — CLOPIDOGREL BISULFATE 75 MG/1
75 TABLET ORAL DAILY
Qty: 30 TABLET | Refills: 0 | Status: SHIPPED | OUTPATIENT
Start: 2022-04-14 | End: 2022-05-14

## 2022-04-13 RX ADMIN — SPIRONOLACTONE 25 MG: 25 TABLET ORAL at 09:04

## 2022-04-13 RX ADMIN — TETROFOSMIN 1 DOSE: 1.38 INJECTION, POWDER, LYOPHILIZED, FOR SOLUTION INTRAVENOUS at 10:27

## 2022-04-13 RX ADMIN — VALSARTAN 40 MG: 80 TABLET, FILM COATED ORAL at 09:03

## 2022-04-13 RX ADMIN — REGADENOSON 0.4 MG: 0.08 INJECTION, SOLUTION INTRAVENOUS at 10:27

## 2022-04-13 RX ADMIN — FLUOROMETHOLONE 1 DROP: 1 SOLUTION/ DROPS OPHTHALMIC at 09:04

## 2022-04-13 RX ADMIN — ISOSORBIDE MONONITRATE 30 MG: 30 TABLET, EXTENDED RELEASE ORAL at 09:03

## 2022-04-13 RX ADMIN — TETROFOSMIN 1 DOSE: 1.38 INJECTION, POWDER, LYOPHILIZED, FOR SOLUTION INTRAVENOUS at 07:11

## 2022-04-13 RX ADMIN — ASPIRIN 81 MG: 81 TABLET, CHEWABLE ORAL at 09:04

## 2022-04-13 RX ADMIN — CLOPIDOGREL BISULFATE 75 MG: 75 TABLET ORAL at 09:04

## 2022-04-13 RX ADMIN — CETIRIZINE HYDROCHLORIDE 10 MG: 10 TABLET, FILM COATED ORAL at 09:03

## 2022-04-13 RX ADMIN — CARVEDILOL 6.25 MG: 6.25 TABLET, FILM COATED ORAL at 09:04

## 2022-04-13 NOTE — DISCHARGE SUMMARY
[facility]    Danish Herbert MD    CHIEF COMPLAINT:     Dyspnea    HISTORY OF PRESENT ILLNESS:    Obtained from H&P on 4/12/2022:  Ms. Marroquin is a 86 y.o.  presents to [facility] complaining of shortness of breath.        86-year-old female presents to the ER with a chief complaint of shortness of breath, especially with exertion, and lying supine when she is trying to go to sleep at night.  The patient does report mild lower extremity edema when up and about for long periods, however, the edema usually resolves by morning time.  Patient denies any recent cough or noted wheezing.  The patient is a former smoker with 40-pack-year history quitting smoking in 1999.  She states she has never been diagnosed with chronic lung disease.     Review of records with summary, records from [facility] in [facility]: Cardiac catheterization 2/18/2022 showing EF 60%, left main artery distal 20% taper, LAD artery the left is a moderate size vessel that wraps around the apex.  The mid segment is 50 to 60% stenosed, left circumflex artery is a nondominant vessel and stent was patent.  Otherwise has luminal irregularities.  Right coronary artery is a large dominant vessel diffusely calcified 50 to 60%, just prior to the distal RCA stent there was a 99% stenosis.  She is allergic to aspirin.  Stent to the left circumflex 5/12/2016; 9/3/2021 PCI and stent to the mid RCA with drug-eluting stent. Aortofemoral bypass 10/5/2007, right common femoral artery arthrectomy with patch angioplasty December 2008.  Platelet count on 3/7/2022 was 104 and 96 on 2/18/2022, hemoglobin 11.7, creatinine 0.81, BUN 16, sodium 139, potassium 4.7    4/13/2022: Patient reports that she continues have some dyspnea throughout her stay and reports this has been consistent since beginning her Brilinta therapy after recent stent placement.  She notes she is able to breathe adequately while at rest  though still feels mildly short of breath with a significant increase in dyspnea reported when walking even short distances on level ground such as to and from her room to the restroom down the reyes.  She continues to deny any pain, nausea or vomiting, cough or fever.  She does note some mild peripheral edema at times if she is on her feet for prolonged period of time but does not currently have any edema.        Past Medical History:   Diagnosis Date   • Hypertension      Past Surgical History:   Procedure Laterality Date   • AORTA SURGERY     • BACK SURGERY     • CORONARY ANGIOPLASTY WITH STENT PLACEMENT  01/06/2022     History reviewed. No pertinent family history.  Social History     Tobacco Use   • Smoking status: Former Smoker   • Smokeless tobacco: Never Used   Vaping Use   • Vaping Use: Never used   Substance Use Topics   • Alcohol use: Never   • Drug use: Never     Medications Prior to Admission   Medication Sig Dispense Refill Last Dose   • aspirin 81 MG chewable tablet Chew 81 mg Daily.   4/12/2022 at Unknown time   • atorvastatin (LIPITOR) 20 MG tablet Take 20 mg by mouth Daily.   4/11/2022 at Unknown time   • carvedilol (COREG) 6.25 MG tablet Take 6.25 mg by mouth 2 (Two) Times a Day With Meals.   4/12/2022 at Unknown time   • fluorometholone (FML) 0.1 % ophthalmic suspension Administer 1 drop to both eyes 2 (Two) Times a Day.   4/12/2022 at Unknown time   • fluticasone (FLONASE) 50 MCG/ACT nasal spray 2 sprays into the nostril(s) as directed by provider Daily.   4/12/2022 at Unknown time   • isosorbide mononitrate (IMDUR) 30 MG 24 hr tablet Take 30 mg by mouth Daily.   4/12/2022 at Unknown time   • loratadine (CLARITIN) 10 MG tablet Take 10 mg by mouth Daily.   4/12/2022 at Unknown time   • Multiple Vitamins-Minerals (Viteyes AREDS Formula) capsule Take  by mouth.   4/12/2022 at Unknown time   • spironolactone (ALDACTONE) 25 MG tablet Take 25 mg by mouth Daily.   4/12/2022 at Unknown time   •  ticagrelor (BRILINTA) 90 MG tablet tablet Take 90 mg by mouth 2 (Two) Times a Day.   4/12/2022 at Unknown time   • valsartan (DIOVAN) 40 MG tablet Take 40 mg by mouth Daily.   4/12/2022 at Unknown time   • nitroglycerin (NITROSTAT) 0.4 MG SL tablet Place 0.4 mg under the tongue Every 5 (Five) Minutes As Needed for Chest Pain. Take no more than 3 doses in 15 minutes.   More than a month at Unknown time     Allergies:  Aspirin, Codeine, and Penicillins      There is no immunization history on file for this patient.        REVIEW OF SYSTEMS:    Review of Systems   Constitutional: Negative.   HENT: Negative.    Eyes: Negative.    Cardiovascular: Positive for chest pain, dyspnea on exertion, leg swelling and palpitations. Negative for irregular heartbeat, near-syncope, orthopnea and syncope.   Respiratory: Positive for shortness of breath. Negative for cough.    Endocrine: Negative.    Skin: Negative.    Musculoskeletal: Negative.    Genitourinary: Negative.    Neurological: Negative.    Psychiatric/Behavioral: Negative.        Vital Signs  Temp:  [97.3 °F (36.3 °C)-98.9 °F (37.2 °C)] 98.9 °F (37.2 °C)  Heart Rate:  [57-68] 66  Resp:  [15-19] 16  BP: (115-147)/(50-68) 142/50          Physical Exam:  Physical Exam  Vitals reviewed.   Constitutional:       General: She is not in acute distress.     Appearance: Normal appearance. She is obese. She is not ill-appearing, toxic-appearing or diaphoretic.   HENT:      Head: Normocephalic and atraumatic.      Right Ear: External ear normal.      Left Ear: External ear normal.      Nose: Nose normal.      Mouth/Throat:      Mouth: Mucous membranes are moist.   Eyes:      Extraocular Movements: Extraocular movements intact.   Cardiovascular:      Rate and Rhythm: Normal rate and regular rhythm.      Pulses: Normal pulses.      Heart sounds: Normal heart sounds.   Pulmonary:      Effort: Pulmonary effort is normal.      Breath sounds: Normal breath sounds.   Abdominal:      General:  Bowel sounds are normal. There is no distension.      Tenderness: There is no abdominal tenderness.   Musculoskeletal:         General: Normal range of motion.      Cervical back: Normal range of motion.      Right lower leg: No edema.      Left lower leg: No edema.   Skin:     General: Skin is warm and dry.      Capillary Refill: Capillary refill takes less than 2 seconds.   Neurological:      General: No focal deficit present.      Mental Status: She is alert and oriented to person, place, and time.   Psychiatric:         Mood and Affect: Mood normal.         Behavior: Behavior normal.         Thought Content: Thought content normal.         Judgment: Judgment normal.         Emotional Behavior:   Normal   Debilities:  None  Results Review:    I reviewed the patient's new clinical results.  Lab Results (most recent)     Procedure Component Value Units Date/Time    CBC & Differential [[id number]] Collected: 04/13/22 1335    Specimen: Blood Updated: 04/13/22 1356    Narrative:      The following orders were created for panel order CBC & Differential.  Procedure                               Abnormality         Status                     ---------                               -----------         ------                     CBC Auto Differential[[id number]]                            In process                   Please view results for these tests on the individual orders.    CBC Auto Differential [[id number]] Collected: 04/13/22 1335    Specimen: Blood Updated: 04/13/22 1356    Reticulocytes [[id number]] Collected: 04/13/22 1335    Specimen: Blood Updated: 04/13/22 1356    Basic Metabolic Panel [[id number]] Collected: 04/13/22 1335    Specimen: Blood Updated: 04/13/22 1356    TSH [[id number]] Collected: 04/13/22 1335    Specimen: Blood Updated: 04/13/22 1356    Lactate Dehydrogenase [[id number]] Collected: 04/13/22 1335    Specimen: Blood Updated: 04/13/22 1356    Haptoglobin [[id number]] Collected: 04/13/22 1335     Specimen: Blood Updated: 04/13/22 1356    Troponin [[id number]]  (Normal) Collected: 04/13/22 0525    Specimen: Blood Updated: 04/13/22 0602     Troponin T <0.010 ng/mL     Narrative:      Troponin T Reference Range:  <= 0.03 ng/mL-   Negative for AMI  >0.03 ng/mL-     Abnormal for myocardial necrosis.  Clinicians would have to utilize clinical acumen, EKG, Troponin and serial changes to determine if it is an Acute Myocardial Infarction or myocardial injury due to an underlying chronic condition.       Results may be falsely decreased if patient taking Biotin.      Respiratory Panel PCR w/COVID-19(SARS-CoV-2) [facility]/KRYSTINA/PAD/COR/MAD/[facility]ERA In-House, NP Swab in UTM/VTM, 3-4 HR TAT - Swab, Nasopharynx [[id number]]  (Normal) Collected: 04/12/22 1332    Specimen: Swab from Nasopharynx Updated: 04/12/22 1707     ADENOVIRUS, PCR Not Detected     Coronavirus 229E Not Detected     Coronavirus HKU1 Not Detected     Coronavirus NL63 Not Detected     Coronavirus OC43 Not Detected     COVID19 Not Detected     Human Metapneumovirus Not Detected     Human Rhinovirus/Enterovirus Not Detected     Influenza A PCR Not Detected     Influenza B PCR Not Detected     Parainfluenza Virus 1 Not Detected     Parainfluenza Virus 2 Not Detected     Parainfluenza Virus 3 Not Detected     Parainfluenza Virus 4 Not Detected     RSV, PCR Not Detected     Bordetella pertussis pcr Not Detected     Bordetella parapertussis PCR Not Detected     Chlamydophila pneumoniae PCR Not Detected     Mycoplasma pneumo by PCR Not Detected    Narrative:      In the setting of a positive respiratory panel with a viral infection PLUS a negative procalcitonin without other underlying concern for bacterial infection, consider observing off antibiotics or discontinuation of antibiotics and continue supportive care. If the respiratory panel is positive for atypical bacterial infection (Bordetella pertussis, Chlamydophila pneumoniae, or Mycoplasma pneumoniae), consider  antibiotic de-escalation to target atypical bacterial infection.    COVID PRE-OP / PRE-PROCEDURE SCREENING ORDER (NO ISOLATION) - Swab, Nasopharynx [[id number]]  (Normal) Collected: 04/12/22 1332    Specimen: Swab from Nasopharynx Updated: 04/12/22 1414    Narrative:      The following orders were created for panel order COVID PRE-OP / PRE-PROCEDURE SCREENING ORDER (NO ISOLATION) - Swab, Nasopharynx.  Procedure                               Abnormality         Status                     ---------                               -----------         ------                     COVID-19,CEPHEID/LORI,CO...[[id number]]  Normal              Final result                 Please view results for these tests on the individual orders.    COVID-19,CEPHEID/LORI,COR/KRYSTINA/PAD/[facility] IN-HOUSE(OR EMERGENT/ADD-ON),NP SWAB IN TRANSPORT MEDIA 3-4 HR TAT, RT-PCR - Swab, Nasopharynx [[id number]]  (Normal) Collected: 04/12/22 1332    Specimen: Swab from Nasopharynx Updated: 04/12/22 1414     COVID19 Not Detected    Narrative:      Fact sheet for providers: https://www.fda.gov/media/817006/download     Fact sheet for patients: https://www.fda.gov/media/539392/download  Fact sheet for providers: https://www.fda.gov/media/743447/download     Fact sheet for patients: https://www.fda.gov/media/054606/download    CBC & Differential [[id number]]  (Abnormal) Collected: 04/12/22 1057    Specimen: Blood Updated: 04/12/22 1155    Narrative:      The following orders were created for panel order CBC & Differential.  Procedure                               Abnormality         Status                     ---------                               -----------         ------                     CBC Auto Differential[[id number]]        Abnormal            Final result               Scan Slide[[id number]]                                       Final result                 Please view results for these tests on the individual orders.    CBC Auto Differential [[id number]]   (Abnormal) Collected: 04/12/22 1057    Specimen: Blood Updated: 04/12/22 1155     WBC 2.80 10*3/mm3      RBC 3.45 10*6/mm3      Hemoglobin 11.4 g/dL      Hematocrit 33.0 %      MCV 95.6 fL      MCH 32.9 pg      MCHC 34.4 g/dL      RDW 15.2 %      RDW-SD 50.3 fl      MPV 12.2 fL      Platelets 64 10*3/mm3      Comment: Slide Reviewed         Neutrophil % 35.3 %      Lymphocyte % 50.0 %      Monocyte % 14.2 %      Eosinophil % 0.2 %      Basophil % 0.3 %      Neutrophils, Absolute 1.00 10*3/mm3      Lymphocytes, Absolute 1.40 10*3/mm3      Monocytes, Absolute 0.40 10*3/mm3      Eosinophils, Absolute 0.00 10*3/mm3      Basophils, Absolute 0.00 10*3/mm3      nRBC 0.2 /100 WBC     Scan Slide [[id number]] Collected: 04/12/22 1057    Specimen: Blood Updated: 04/12/22 1155     RBC Morphology Normal     WBC Morphology Normal     Platelet Estimate Decreased     Large Platelets Slight/1+     Giant Platelets Slight/1+    Protime-INR [[id number]]  (Normal) Collected: 04/12/22 1057    Specimen: Blood Updated: 04/12/22 1131     Protime 10.6 Seconds      INR 1.03    aPTT [[id number]]  (Normal) Collected: 04/12/22 1057    Specimen: Blood Updated: 04/12/22 1131     PTT 27.9 seconds     Comprehensive Metabolic Panel [[id number]]  (Abnormal) Collected: 04/12/22 1057    Specimen: Blood Updated: 04/12/22 1128     Glucose 97 mg/dL      BUN 22 mg/dL      Creatinine 0.80 mg/dL      Sodium 138 mmol/L      Potassium 4.2 mmol/L      Chloride 104 mmol/L      CO2 22.0 mmol/L      Calcium 9.4 mg/dL      Total Protein 6.9 g/dL      Albumin 4.40 g/dL      ALT (SGPT) 15 U/L      AST (SGOT) 15 U/L      Alkaline Phosphatase 71 U/L      Total Bilirubin 0.8 mg/dL      Globulin 2.5 gm/dL      A/G Ratio 1.8 g/dL      BUN/Creatinine Ratio 27.5     Anion Gap 12.0 mmol/L      eGFR 72.8 mL/min/1.73      Comment: National Kidney Foundation and American Society of Nephrology (ASN) Task Force recommended calculation based on the Chronic Kidney Disease Epidemiology  Collaboration (CKD-EPI) equation refit without adjustment for race.       Narrative:      GFR Normal >60  Chronic Kidney Disease <60  Kidney Failure <15      Troponin [[id number]]  (Normal) Collected: 04/12/22 1057    Specimen: Blood Updated: 04/12/22 1128     Troponin T <0.010 ng/mL     Narrative:      Troponin T Reference Range:  <= 0.03 ng/mL-   Negative for AMI  >0.03 ng/mL-     Abnormal for myocardial necrosis.  Clinicians would have to utilize clinical acumen, EKG, Troponin and serial changes to determine if it is an Acute Myocardial Infarction or myocardial injury due to an underlying chronic condition.       Results may be falsely decreased if patient taking Biotin.      BNP [[id number]]  (Normal) Collected: 04/12/22 1057    Specimen: Blood Updated: 04/12/22 1126     proBNP 138.8 pg/mL     Narrative:      Among patients with dyspnea, NT-proBNP is highly sensitive for the detection of acute congestive heart failure. In addition NT-proBNP of <300 pg/ml effectively rules out acute congestive heart failure with 99% negative predictive value.    Results may be falsely decreased if patient taking Biotin.            Imaging Results (Most Recent)     Procedure Component Value Units Date/Time    XR Chest 1 View [[id number]] Collected: 04/12/22 1116     Updated: 04/12/22 1118    Narrative:         DATE OF EXAM:   4/12/2022 11:10 AM     PROCEDURE:   XR CHEST 1 VW-     INDICATIONS:   soa; R06.02-Shortness of breath     COMPARISON:  No Comparisons Available     TECHNIQUE:   Portable chest radiograph.     FINDINGS:    The cardiomediastinal silhouette is within normal limits. The lungs are  clear. There is no pneumothorax, focal consolidation, or large pleural  effusion. Osseous structures grossly intact.        Impression:      No acute process.      Electronically Signed By-Aditya Fraser MD On:4/12/2022 11:16 AM  This report was finalized on [id number] by  Aditya Fraser MD.        reviewed    ECG/EMG Results (most recent)      Procedure Component Value Units Date/Time    ECG 12 Lead [[id number]] Collected: 04/12/22 1044     Updated: 04/12/22 1046     QT Interval 411 ms     Narrative:      HEART RATE= 57  bpm  RR Interval= 1044  ms  GA Interval= 191  ms  P Horizontal Axis= 38  deg  P Front Axis= 6  deg  QRSD Interval= 96  ms  QT Interval= 411  ms  QRS Axis= 59  deg  T Wave Axis= 36  deg  - OTHERWISE NORMAL ECG -  Sinus bradycardia  No previous ECG available for comparison  Electronically Signed By:   Date and Time of Study: 2022-04-12 10:44:17        reviewed            Microbiology Results (last 10 days)     Procedure Component Value - Date/Time    COVID PRE-OP / PRE-PROCEDURE SCREENING ORDER (NO ISOLATION) - Swab, Nasopharynx [[id number]]  (Normal) Collected: 04/12/22 1332    Lab Status: Final result Specimen: Swab from Nasopharynx Updated: 04/12/22 1414    Narrative:      The following orders were created for panel order COVID PRE-OP / PRE-PROCEDURE SCREENING ORDER (NO ISOLATION) - Swab, Nasopharynx.  Procedure                               Abnormality         Status                     ---------                               -----------         ------                     COVID-19,CEPHEID/LORI,CO...[[id number]]  Normal              Final result                 Please view results for these tests on the individual orders.    COVID-19,CEPHEID/LORI,COR/KRYSTINA/PAD/[facility] IN-HOUSE(OR EMERGENT/ADD-ON),NP SWAB IN TRANSPORT MEDIA 3-4 HR TAT, RT-PCR - Swab, Nasopharynx [[id number]]  (Normal) Collected: 04/12/22 1332    Lab Status: Final result Specimen: Swab from Nasopharynx Updated: 04/12/22 1414     COVID19 Not Detected    Narrative:      Fact sheet for providers: https://www.fda.gov/media/375351/download     Fact sheet for patients: https://www.fda.gov/media/128466/download  Fact sheet for providers: https://www.fda.gov/media/039897/download     Fact sheet for patients: https://www.fda.gov/media/384900/download    Respiratory Panel PCR  w/COVID-19(SARS-CoV-2) [facility] In-House, NP Swab in UTM/VTM, 3-4 HR TAT - Swab, Nasopharynx [[id number]]  (Normal) Collected: 04/12/22 1332    Lab Status: Final result Specimen: Swab from Nasopharynx Updated: 04/12/22 8440     ADENOVIRUS, PCR Not Detected     Coronavirus 229E Not Detected     Coronavirus HKU1 Not Detected     Coronavirus NL63 Not Detected     Coronavirus OC43 Not Detected     COVID19 Not Detected     Human Metapneumovirus Not Detected     Human Rhinovirus/Enterovirus Not Detected     Influenza A PCR Not Detected     Influenza B PCR Not Detected     Parainfluenza Virus 1 Not Detected     Parainfluenza Virus 2 Not Detected     Parainfluenza Virus 3 Not Detected     Parainfluenza Virus 4 Not Detected     RSV, PCR Not Detected     Bordetella pertussis pcr Not Detected     Bordetella parapertussis PCR Not Detected     Chlamydophila pneumoniae PCR Not Detected     Mycoplasma pneumo by PCR Not Detected    Narrative:      In the setting of a positive respiratory panel with a viral infection PLUS a negative procalcitonin without other underlying concern for bacterial infection, consider observing off antibiotics or discontinuation of antibiotics and continue supportive care. If the respiratory panel is positive for atypical bacterial infection (Bordetella pertussis, Chlamydophila pneumoniae, or Mycoplasma pneumoniae), consider antibiotic de-escalation to target atypical bacterial infection.          Assessment/Plan     Shortness of breath     Shortness of breath, uncertain etiology--cardiac cause needs to be considered: Serial troponin; cardiology consult; stop Brilinta per cardiology recommendation; add Plavix loading dose 300 mg followed by 75 mg daily   -Serial troponins remain less than 0.010 proBNP within normal limits at 138.8, patient maintained O2 saturation between 96-98% on room air     CAD, chronic with recent stent placement in September 2021 and February 2022: Add Plavix;  discontinue Brilinta; continue aspirin  -patient reports she has an aspirin allergy but can tolerate a baby aspirin daily and has been taking 1 since her most recent stent placement     Pancytopenia with thrombocytopenia, marked, platelets 64: Repeat CBC in a.m.  -CBC 2.80, platelets 64, hemoglobin 11.7  -Review of records showing Platelet count on 3/7/2022 was 104 and 96 on 2/18/2022 at [facility]   -Remained generally stable with WBCs of 2.80, hemoglobin of 11.5 and platelets of 59 without any obvious signs of bruising.  Reticulocytes, TSH and LDH assessed and found to be within normal limits     HLD, chronic: Continue atorvastatin     CAD, chronic: Continue Coreg; continue Imdur; continue lisinopril; hold Brilinta; add Plavix per cardiology recommendation     Allergies, chronic: Continue Claritin     Hypertension, chronic with lower extremity edema: Continue valsartan; continue Imdur; continue lisinopril; continue Coreg      I discussed the patients findings and my recommendations with patient and nursing staff.     Discharge Diagnosis:      Shortness of breath      Hospital Course  Patient is a 86 y.o. female presented with dyspnea and in HPI noted above.  Serial troponins remain less than 0.010 and proBNP is within normal limits at 138.8.  Oxygen saturations remained above 96% on room air and patient is maintained on telemetry without significant events noted throughout her admission.  Chest x-ray was obtained which showed no acute process and respiratory viral panel including COVID-19 was found to be negative.  EKG showed sinus bradycardia at 57 without obvious acute ST changes or ectopy with a QTC of 402 ms and some baseline artifact noted.  Cardiology was consulted who recommended discontinuation of Brilinta and loading dose followed by maintenance of Plavix which was initiated during her admission.  Stress testing was performed with interpreting physician noting a low risk test.  Echocardiogram was  also ordered and performed with results pending and patient to follow-up on an outpatient basis with cardiology provider in 2 weeks.  Patient also has a history of pancytopenia with initial labs showing WBCs of 2.80 with a hemoglobin of 11.4 and platelets of 64, these do represent some worsening from her previous findings at outlying facility.  Repeat CBC showed WBCs, hemoglobin and platelets generally stable at 2.80, 11.5 and 59 respectively.  Additional labs including TSH: 1.830, LDH: 1.97, haptoglobin and CK pending.  And reticulocytes were assessed.  Patient confirms no obvious signs of bleeding or significant bruising and vital signs have remained generally stable throughout her admission.  She confirms that she has an appointment with hematology on 4/25/2022 on an outpatient basis and is encouraged to maintain this appointment for further work-up.  At this time patient is felt to be in good condition for discharge with close follow-up with her PCP, cardiology and hematology on an outpatient basis.  Echocardiogram to be scheduled on outpatient basis.  Her full testing/results and plan were discussed with patient along with concerning/alarm symptoms which to call 911/return to the ED.  All questions were answered she verbalizes her understanding and agreement.    Past Medical History:     Past Medical History:   Diagnosis Date   • Hypertension        Past Surgical History:     Past Surgical History:   Procedure Laterality Date   • AORTA SURGERY     • BACK SURGERY     • CORONARY ANGIOPLASTY WITH STENT PLACEMENT  01/06/2022       Social History:   Social History     Socioeconomic History   • Marital status:    Tobacco Use   • Smoking status: Former Smoker   • Smokeless tobacco: Never Used   Vaping Use   • Vaping Use: Never used   Substance and Sexual Activity   • Alcohol use: Never   • Drug use: Never   • Sexual activity: Defer       Procedures Performed         Consults:   Consults     Date and Time Order  Name Status Description    4/12/2022  1:10 PM Cardiology (on-call MD unless specified) Completed           Condition on Discharge:     Stable    Discharge Disposition  Home or Self Care    Discharge Medications     Discharge Medications      New Medications      Instructions Start Date   clopidogrel 75 MG tablet  Commonly known as: PLAVIX   75 mg, Oral, Daily   Start Date: April 14, 2022        Continue These Medications      Instructions Start Date   aspirin 81 MG chewable tablet   81 mg, Oral, Daily      atorvastatin 20 MG tablet  Commonly known as: LIPITOR   20 mg, Oral, Daily      carvedilol 6.25 MG tablet  Commonly known as: COREG   6.25 mg, Oral, 2 Times Daily With Meals      fluorometholone 0.1 % ophthalmic suspension  Commonly known as: FML   1 drop, Both Eyes, 2 Times Daily      fluticasone 50 MCG/ACT nasal spray  Commonly known as: FLONASE   2 sprays, Nasal, Daily      isosorbide mononitrate 30 MG 24 hr tablet  Commonly known as: IMDUR   30 mg, Oral, Daily      loratadine 10 MG tablet  Commonly known as: CLARITIN   10 mg, Oral, Daily      nitroglycerin 0.4 MG SL tablet  Commonly known as: NITROSTAT   0.4 mg, Sublingual, Every 5 Minutes PRN, Take no more than 3 doses in 15 minutes.      spironolactone 25 MG tablet  Commonly known as: ALDACTONE   25 mg, Oral, Daily      valsartan 40 MG tablet  Commonly known as: DIOVAN   40 mg, Oral, Daily      Viteyes AREDS Formula capsule   Oral         Stop These Medications    ticagrelor 90 MG tablet tablet  Commonly known as: BRILINTA            Discharge Diet:     Activity at Discharge:     Follow-up Appointments  No future appointments.  Additional Instructions for the Follow-ups that You Need to Schedule     Discharge Follow-up with PCP   As directed       Currently Documented PCP:    Danish Herbert MD    PCP Phone Number:    [phone]     Follow Up Details: 5 to 7 days         Discharge Follow-up with Specified Provider: Cardiology; 2 Weeks   As  directed      To: Cardiology    Follow Up: 2 Weeks         Discharge Follow-up with Specified Provider: Hematology   As directed      To: Hematology    Follow Up Details: As scheduled               Test Results Pending at Discharge  Pending Labs     Order Current Status    Haptoglobin In process           Risk for Readmission (LACE) Score: 1 (4/13/2022  6:01 AM)          Tomas Moreland PA-C  04/13/22  16:12 EDT

## 2022-04-13 NOTE — PLAN OF CARE
Goal Outcome Evaluation:  Plan of Care Reviewed With: patient        Progress: improving  Outcome Evaluation: Patient has no complaints of pain or shortness of breath throughout shift. VSS. Awaiting further orders per cardiology. Will monitor.

## 2022-04-13 NOTE — PROGRESS NOTES
Cardiology Progress Note    Patient Identification:  Name: Tricia Marroquin  Age: 86 y.o.  Sex: female  :  1935  MRN: [id number]                 Follow Up / Chief Complaint: Shortness of breath. Recent PCI   Chief Complaint   Patient presents with   • Shortness of Breath     Pt c/o SOA, heart palpitations, pt had 2 cardiac stents recently placed-January.       Interval History: Patient presented with shortness of breath, underwent the stress test this morning.     NP NOTE: Patient seen, examination unremarkable.  Patient denies chest pain, still somewhat short of breath, but improved.  Continue Aspirin and plavix (Brilinta changed to plavix due to shortness of breath). Continue statin, beta blocker and nitrates    Electronically signed by Tessie Dutta, [facility], 22, 10:04 AM EDT.    Cardiology attending addendum :    I have personally performed a face-to-face diagnostic evaluation, physical exam and reviewed data on this patient.  I have reviewed documentation done by me and nurse practitioner Tessie Dutta and corrected as needed.  And agree with the different components of documentation.Greater than 50% of the time spent in the care of this patient was provided by attending consultant/me.           Subjective: Patient seen and examined, chart and labs reviewed, discussed with bedside nurse.       Objective: serial troponin negative; pro bnp negative         History of Present Illness:       Ms. Tricia Marroquin has PMH of     CAD, stent to LCx 2016, NSTEMI 9/3/2022 and PCI to RCA, PCI 2022  Hypertension  Dyslipidemia  PAD, aortobifem 10/5/2007, right common femoral arthrectomy with patch angioplasty 2008  Thrombocytopenia  JELENA, back surgery, aorta surgery  Former smoker quit in   Allergy to penicillin  Allergy to aspirin but tolerates baby aspirin           Presented through emergency room 2022 with progressively worsening shortness of breath and PND not able to lie supine to sleep at  nighttime and mild lower extremity edema.  Denies any chest pain.  Patient work-up here revealed normal troponin, normal proBNP at 138.  Normal CMP, PT PTT.  CBC with low white count at 2.8, hemoglobin at 11.4, platelet count of 64  Chest x-ray 4/12/2022 reveals no acute cardiopulmonary abnormality.  EKG reviewed by me from 4/12/2022 reveals sinus rhythm with sinus bradycardia at the rate of 57 bpm           Assessment:  :     Shortness of breath, dyspnea on exertion, orthopnea  CAD, MI, PCI  PAD history of surgery  Hypertension  Dyslipidemia  Former smoker  Thrombocytopenia           Recommendations / Plan:         Patient says she has allergy to aspirin but tolerates baby aspirin okay.  Patient is having shortness of breath which could be potentially from Brilinta.  We will change her to Plavix after loading.  Patient had developed stenosis just before the previous RCA stent requiring repeat stenting.  Consult hematology for thrombocytopenia evaluation and treatment.  Will schedule stress Cardiolite to evaluate for ischemia as a cause of her dyspnea.  Patient underwent Lexiscan Cardiolite 4/13/2022 which is negative for ischemia.    Discussed with nurse practitioner Laura Moreland.  We will follow-up closely as outpatient in 2 weeks if patient's dyspnea does not improve after discontinuing Brilinta we will consider further evaluation and treatment.  Reviewed records from [facility] hospital and summarized in HPI.              Past Medical History:  Past Medical History:   Diagnosis Date   • Hypertension      Past Surgical History:  Past Surgical History:   Procedure Laterality Date   • AORTA SURGERY     • BACK SURGERY     • CORONARY ANGIOPLASTY WITH STENT PLACEMENT  01/06/2022        Social History:   Social History     Tobacco Use   • Smoking status: Former Smoker   • Smokeless tobacco: Never Used   Substance Use Topics   • Alcohol use: Never      Family History:  History reviewed. No pertinent family history.  "      Allergies:  Allergies   Allergen Reactions   • Aspirin Other (See Comments)     Pt reports \"makes my heart go crazy\"   • Codeine GI Intolerance   • Penicillins Hives     Scheduled Meds:  aspirin, 81 mg, Daily  atorvastatin, 20 mg, Nightly  carvedilol, 6.25 mg, BID With Meals  cetirizine, 10 mg, Daily  clopidogrel, 75 mg, Daily  fluorometholone, 1 drop, BID  isosorbide mononitrate, 30 mg, Daily  spironolactone, 25 mg, Daily  valsartan, 40 mg, Daily          Review of Systems:   ROS  Review of Systems   Constitution: Negative for chills and fever.   Cardiovascular: Negative for chest pain and palpitations.   Respiratory: Negative for cough and hemoptysis.    Gastrointestinal: Negative for nausea.        Constitutional:  Temp:  [97.3 °F (36.3 °C)-98.3 °F (36.8 °C)] 97.3 °F (36.3 °C)  Heart Rate:  [56-68] 60  Resp:  [15-19] 15  BP: (115-160)/(46-68) 115/61    Physical Exam   /61 (BP Location: Right arm, Patient Position: Lying)   Pulse 60   Temp 97.3 °F (36.3 °C) (Oral)   Resp 15   Ht 154.9 cm (61\")   Wt 74.8 kg (164 lb 12.8 oz)   SpO2 96%   BMI 31.14 kg/m²   General:  Appears in no acute distress  Eyes: Sclera is anicteric,  conjunctiva is clear   HEENT:  No JVD. Thyroid not visibly enlarged. No mucosal pallor or cyanosis  Respiratory: Respirations regular and unlabored at rest.  Bilaterally good breath sounds, with good air entry in all fields. No crackles, rubs or wheezes auscultated  Cardiovascular: S1,S2 Regular rate and rhythm. No murmur, rub or gallop auscultated.  . No pretibial pitting edema  Gastrointestinal: Abdomen nondistended, soft  Musculoskeletal:  No abnormal movements  Extremities: No digital clubbing or cyanosis  Skin: Color pink. Skin warm and dry to touch. No rashes  No xanthoma  Neuro: Alert and awake, no lateralizing deficits appreciated    INTAKE AND OUTPUT:    Intake/Output Summary (Last 24 hours) at 4/13/2022 1326  Last data filed at 4/13/2022 1012  Gross per 24 hour   Intake " "1070 ml   Output --   Net 1070 ml       Cardiographics  Telemetry: sinus rhythm     ECG:   ECG 12 Lead   Preliminary Result   HEART RATE= 57  bpm   RR Interval= 1044  ms   VT Interval= 191  ms   P Horizontal Axis= 38  deg   P Front Axis= 6  deg   QRSD Interval= 96  ms   QT Interval= 411  ms   QRS Axis= 59  deg   T Wave Axis= 36  deg   - OTHERWISE NORMAL ECG -   Sinus bradycardia   No previous ECG available for comparison   Electronically Signed By:    Date and Time of Study: 2022-04-12 10:44:17        I have personally reviewed EKG    Echocardiogram:     Lab Review   I have reviewed the labs  Results from last 7 days   Lab Units 04/13/22  0525 04/12/22  1057   TROPONIN T ng/mL <0.010 <0.010         Results from last 7 days   Lab Units 04/12/22  1057   SODIUM mmol/L 138   POTASSIUM mmol/L 4.2   BUN mg/dL 22   CREATININE mg/dL 0.80   CALCIUM mg/dL 9.4         Results from last 7 days   Lab Units 04/12/22  1057   WBC 10*3/mm3 2.80*   HEMOGLOBIN g/dL 11.4*   HEMATOCRIT % 33.0*   PLATELETS 10*3/mm3 64*     Results from last 7 days   Lab Units 04/12/22  1057   INR  1.03   APTT seconds 27.9       RADIOLOGY:  Imaging Results (Last 24 Hours)     ** No results found for the last 24 hours. **                )4/13/2022  Rodriguez Glass MD    Electronically signed by Rodriguez Glass MD, 04/13/22, 1:27 PM EDT.    EMR Dragon/Transcription:   \"Dictated utilizing Dragon dictation\".   "

## 2022-04-13 NOTE — CASE MANAGEMENT/SOCIAL WORK
Continued Stay Note  JAZ Garcia     Patient Name: Tricia Marroquin  MRN: [id number]  Today's Date: 4/13/2022    Admit Date: 4/12/2022     Discharge Plan     Row Name 04/13/22 1421       Plan    Plan Comments d/c barrier: Myoview            Phone communication or documentation only - no physical contact with patient or family.            Candice Metzger RN

## 2022-04-13 NOTE — PLAN OF CARE
Goal Outcome Evaluation:  Plan of Care Reviewed With: patient           Outcome Evaluation: Pt is much improved and is scheduled for discharge today

## 2022-04-14 LAB — HAPTOGLOB SERPL-MCNC: 122 MG/DL (ref 30–200)

## 2022-04-14 NOTE — CASE MANAGEMENT/SOCIAL WORK
Case Management Discharge Note      Final Note: home         Transportation Services  Private: Car    Final Discharge Disposition Code: 01 - home or self-care

## 2022-05-09 ENCOUNTER — OFFICE VISIT (OUTPATIENT)
Dept: CARDIOLOGY | Facility: CLINIC | Age: 87
End: 2022-05-09

## 2022-05-09 ENCOUNTER — HOSPITAL ENCOUNTER (OUTPATIENT)
Dept: CARDIOLOGY | Facility: HOSPITAL | Age: 87
Discharge: HOME | End: 2022-05-09
Admitting: NURSE PRACTITIONER

## 2022-05-09 VITALS
SYSTOLIC BLOOD PRESSURE: 148 MMHG | OXYGEN SATURATION: 98 % | HEART RATE: 63 BPM | HEIGHT: 61 IN | BODY MASS INDEX: 31.34 KG/M2 | WEIGHT: 166 LBS | DIASTOLIC BLOOD PRESSURE: 72 MMHG

## 2022-05-09 VITALS — BODY MASS INDEX: 31.34 KG/M2 | WEIGHT: 166 LBS | HEIGHT: 61 IN

## 2022-05-09 DIAGNOSIS — E78.5 DYSLIPIDEMIA: ICD-10-CM

## 2022-05-09 DIAGNOSIS — I25.2 HISTORY OF MYOCARDIAL INFARCTION: ICD-10-CM

## 2022-05-09 DIAGNOSIS — R06.09 DYSPNEA ON EXERTION: ICD-10-CM

## 2022-05-09 DIAGNOSIS — I73.9 PAD (PERIPHERAL ARTERY DISEASE): ICD-10-CM

## 2022-05-09 DIAGNOSIS — I10 ESSENTIAL HYPERTENSION: ICD-10-CM

## 2022-05-09 DIAGNOSIS — I25.10 CAD S/P PERCUTANEOUS CORONARY ANGIOPLASTY: ICD-10-CM

## 2022-05-09 DIAGNOSIS — R06.09 DYSPNEA ON EXERTION: Primary | ICD-10-CM

## 2022-05-09 DIAGNOSIS — Z98.61 CAD S/P PERCUTANEOUS CORONARY ANGIOPLASTY: ICD-10-CM

## 2022-05-09 PROCEDURE — 93306 TTE W/DOPPLER COMPLETE: CPT

## 2022-05-09 PROCEDURE — 99214 OFFICE O/P EST MOD 30 MIN: CPT | Performed by: INTERNAL MEDICINE

## 2022-05-09 PROCEDURE — 93306 TTE W/DOPPLER COMPLETE: CPT | Performed by: INTERNAL MEDICINE

## 2022-05-09 NOTE — PROGRESS NOTES
Subjective:     Encounter Date:05/09/2022      Patient ID: Tricia Marroquin is a 86 y.o. female.    Chief Complaint : Hospital follow-up for shortness of breath, CAD, PCI    History of Present Illness         Ms. Tricia Marroquin has PMH of     CAD, stent to LCx 5/12/2016, NSTEMI 9/3/2022 and PCI to RCA, PCI 1/6/2022  Dyspnea due to Brilinta  Hypertension  Dyslipidemia  PAD, aortobifem 10/5/2007, right common femoral arthrectomy with patch angioplasty 12/2008  Thrombocytopenia  JELENA, back surgery, aorta surgery  Former smoker quit in 1999  Allergy to penicillin  Allergy to aspirin but tolerates baby aspirin     Here for hospital follow-up.  Patient was recently in the hospital 4/12/2022 with progressively worsening shortness of breath PND underwent work-up including stress Cardiolite 4/13/2022 which was negative for ischemia is here for follow-up.  Patient says she is feeling whole lot better after stopping Brilinta.  Patient underwent echocardiogram today which revealed hypertensive cardiovascular disease.  Patient denies any chest pain or shortness of breath    Patient's arterial blood pressure is 148/72, heart rate 63, O2 sat of 98% on room air.     Patient work-up 4/12/2022 revealed normal troponin, normal proBNP at 138.  Normal CMP, PT PTT.  CBC with low white count at 2.8, hemoglobin at 11.4, platelet count of 64  Chest x-ray 4/12/2022 reveals no acute cardiopulmonary abnormality.  EKG reviewed by me from 4/12/2022 reveals sinus rhythm with sinus bradycardia at the rate of 57 bpm           Assessment:  :     Shortness of breath, dyspnea on exertion, orthopnea  CAD, MI, PCI  PAD history of surgery  Hypertension  Dyslipidemia  Former smoker  Thrombocytopenia           Recommendations / Plan:         Patient says she has allergy to aspirin but tolerates baby aspirin okay.  Patient was having shortness of breath which improved significantly after Brilinta was changed to Plavix.  We will continue Plavix and  "aspirin for stents.  Since patient has thrombocytopenia will monitor closely.  Patient had developed stenosis just before the previous RCA stent requiring repeat stenting.  Follow-up with hematology for thrombocytopenia.  Patient underwent Lexiscan Cardiolite 4/13/2022 which is negative for ischemia.  Reviewed BMI over 31 counseled on walking exercise after to 40 minutes a day 5 days a week.      Procedures    The following portions of the patient's history were reviewed and updated as appropriate: allergies, current medications, past family history, past medical history, past social history, past surgical history and problem list.    Assessment:         [facility]     Diagnosis Plan   1. Dyspnea on exertion     2. CAD S/P percutaneous coronary angioplasty     3. History of myocardial infarction     4. Essential hypertension     5. Dyslipidemia     6. PAD (peripheral artery disease) ([facility])            Plan:               Past Medical History:  Past Medical History:   Diagnosis Date   • Hypertension    • Seasonal allergies      Past Surgical History:  Past Surgical History:   Procedure Laterality Date   • AORTA SURGERY     • BACK SURGERY     • CORONARY ANGIOPLASTY WITH STENT PLACEMENT  01/06/2022      Allergies:  Allergies   Allergen Reactions   • Aspirin Other (See Comments)     Pt reports \"makes my heart go crazy\"   • Codeine GI Intolerance   • Penicillins Hives   • Lisinopril Cough     Home Meds:  Current Meds:     Current Outpatient Medications:   •  aspirin 81 MG chewable tablet, Chew 81 mg Daily., Disp: , Rfl:   •  atorvastatin (LIPITOR) 20 MG tablet, Take 20 mg by mouth Daily., Disp: , Rfl:   •  carvedilol (COREG) 6.25 MG tablet, Take 6.25 mg by mouth 2 (Two) Times a Day With Meals., Disp: , Rfl:   •  clopidogrel (PLAVIX) 75 MG tablet, Take 1 tablet by mouth Daily for 30 days., Disp: 30 tablet, Rfl: 0  •  fluorometholone (FML) 0.1 % ophthalmic suspension, Administer 1 drop to both eyes 2 (Two) Times a Day., Disp: , " "Rfl:   •  fluticasone (FLONASE) 50 MCG/ACT nasal spray, 2 sprays into the nostril(s) as directed by provider Daily., Disp: , Rfl:   •  isosorbide mononitrate (IMDUR) 30 MG 24 hr tablet, Take 30 mg by mouth Daily., Disp: , Rfl:   •  loratadine (CLARITIN) 10 MG tablet, Take 10 mg by mouth Daily., Disp: , Rfl:   •  Multiple Vitamins-Minerals (Viteyes AREDS Formula) capsule, Take  by mouth., Disp: , Rfl:   •  nitroglycerin (NITROSTAT) 0.4 MG SL tablet, Place 0.4 mg under the tongue Every 5 (Five) Minutes As Needed for Chest Pain. Take no more than 3 doses in 15 minutes., Disp: , Rfl:   •  spironolactone (ALDACTONE) 25 MG tablet, Take 25 mg by mouth As Needed. For edema, Disp: , Rfl:   •  valsartan (DIOVAN) 40 MG tablet, Take 40 mg by mouth Daily., Disp: , Rfl:   Social History:   Social History     Tobacco Use   • Smoking status: Former Smoker     Packs/day: 2.00     Years: 40.00     Pack years: 80.00   • Smokeless tobacco: Never Used   Substance Use Topics   • Alcohol use: Never      Family History:  Family History   Problem Relation Age of Onset   • Heart disease Mother    • Heart disease Father    • Heart attack Father               Review of Systems   Constitutional: Positive for malaise/fatigue.   Cardiovascular: Positive for palpitations. Negative for chest pain and leg swelling.   Respiratory: Positive for shortness of breath.    Skin: Negative for rash.   Neurological: Positive for dizziness, light-headedness and numbness (hands).     All other systems are negative         Objective:     Physical Exam  /72   Pulse 63   Ht 154.9 cm (61\")   Wt 75.3 kg (166 lb)   SpO2 98%   BMI 31.37 kg/m²   General:  Appears in no acute distress  Eyes: Sclera is anicteric,  conjunctiva is clear   HEENT:  No JVD.  No carotid bruits  Respiratory: Respirations regular and unlabored at rest.  Clear to auscultation  Cardiovascular: S1,S2 Regular rate and rhythm. No murmur, rub or gallop auscultated.   Extremities: No digital " "clubbing or cyanosis, no edema  Skin: Color pink. Skin warm and dry to touch. No rashes  No xanthoma  Neuro: Alert and awake.    Lab Reviewed:         Rodriguez Glass MD  5/9/2022 13:01 EDT      EMR Dragon/Transcription:   \"Dictated utilizing Dragon dictation\".        "

## 2022-05-10 LAB
MAXIMAL PREDICTED HEART RATE: 136 BPM
STRESS TARGET HR: 116 BPM

## 2022-09-12 ENCOUNTER — TELEPHONE (OUTPATIENT)
Dept: CARDIOLOGY | Facility: CLINIC | Age: 87
End: 2022-09-12

## 2022-09-12 NOTE — TELEPHONE ENCOUNTER
Called patient to verify if she is still having any swelling/edema since that is what the Spironolactone is prescribed for.     No answer, LMOM.

## 2022-09-12 NOTE — TELEPHONE ENCOUNTER
Patient's son is asking if you think the patient needs to continue taking the Spironolactone medication. He states that the patient has been taking it, so he is not sure if she is still having any edema. Please advise.

## 2022-09-12 NOTE — TELEPHONE ENCOUNTER
Incoming Refill Request      Medication requested (name and dose): SPIRONOLACTONE 25 MG    Pharmacy where request should be sent: [facility]    Additional details provided by patient: DOES DR. MEDINA WANT HER TO CONTINUE THIS MEDICATION?     Best call back number:[phone]  Does the patient have less than a 3 day supply:  [] Yes  [] No    Sandra Fontanez Rep  09/12/22, 11:42 EDT

## 2022-09-13 NOTE — TELEPHONE ENCOUNTER
The instructions for Spironolactone say to take the medication as needed for edema. Would you like her to take this medication daily? Or only when she has edema?

## 2022-09-13 NOTE — TELEPHONE ENCOUNTER
CALLED TALKED TO PT, SHE HAS BEEN TAKING IT DAILY SHE SAID.  SHE IS OUT OF REFILLS.     SHOULD SHE TAKE IT DAILY OR PRN EDEMA?             PHARM [facility]   I TOLD PT I WILL CALL HER BACK BY THE END OF THE DAY TOMORROW

## 2022-09-14 RX ORDER — SPIRONOLACTONE 25 MG/1
25 TABLET ORAL DAILY
Qty: 90 TABLET | Refills: 3 | Status: SHIPPED | OUTPATIENT
Start: 2022-09-14

## 2022-09-14 RX ORDER — SPIRONOLACTONE 25 MG/1
25 TABLET ORAL DAILY
COMMUNITY
End: 2022-09-14 | Stop reason: SDUPTHER

## 2022-09-21 ENCOUNTER — TELEPHONE (OUTPATIENT)
Dept: CARDIOLOGY | Facility: CLINIC | Age: 87
End: 2022-09-21

## 2022-09-21 NOTE — TELEPHONE ENCOUNTER
Went to [facility] Friday night for cp, nitro for relief, still having cp off and on, please advise

## 2022-09-22 ENCOUNTER — OFFICE VISIT (OUTPATIENT)
Dept: CARDIOLOGY | Facility: CLINIC | Age: 87
End: 2022-09-22

## 2022-09-22 VITALS
DIASTOLIC BLOOD PRESSURE: 59 MMHG | WEIGHT: 167 LBS | HEIGHT: 61 IN | OXYGEN SATURATION: 99 % | HEART RATE: 79 BPM | SYSTOLIC BLOOD PRESSURE: 173 MMHG | BODY MASS INDEX: 31.53 KG/M2

## 2022-09-22 DIAGNOSIS — R23.3 EASY BRUISING: ICD-10-CM

## 2022-09-22 DIAGNOSIS — D69.6 THROMBOCYTOPENIA: ICD-10-CM

## 2022-09-22 DIAGNOSIS — I25.110 CORONARY ARTERY DISEASE INVOLVING NATIVE CORONARY ARTERY OF NATIVE HEART WITH UNSTABLE ANGINA PECTORIS: Primary | ICD-10-CM

## 2022-09-22 DIAGNOSIS — I10 ESSENTIAL HYPERTENSION: ICD-10-CM

## 2022-09-22 DIAGNOSIS — E78.5 DYSLIPIDEMIA: ICD-10-CM

## 2022-09-22 DIAGNOSIS — I73.9 PAD (PERIPHERAL ARTERY DISEASE): ICD-10-CM

## 2022-09-22 PROCEDURE — 99214 OFFICE O/P EST MOD 30 MIN: CPT | Performed by: INTERNAL MEDICINE

## 2022-09-22 RX ORDER — CLOPIDOGREL BISULFATE 75 MG/1
75 TABLET ORAL DAILY
COMMUNITY
Start: 2022-09-15

## 2022-09-22 RX ORDER — FUROSEMIDE 20 MG/1
TABLET ORAL
COMMUNITY
Start: 2022-07-24

## 2022-09-22 NOTE — PROGRESS NOTES
Subjective:     Encounter Date:09/22/2022      Patient ID: Tricia Marroquin is a 87 y.o. female.    Chief Complaint : Acute visit for chest pain and new diagnosed A. fib, history of CAD, PCI, hypertension, dyslipidemia    History of Present Illness         Ms. Tricia Marroquin has PMH of     CAD, stent to LCx 5/12/2016, NSTEMI 9/3/2022 and PCI to RCA, PCI 1/6/2022      Chest pain negative Lexiscan Cardiolite 4/13/2022  Dyspnea due to Brilinta  Hypertension  Dyslipidemia  PAD, aortobifem 10/5/2007, right common femoral arthrectomy with patch angioplasty 12/2008  Thrombocytopenia  JELENA, back surgery, aorta surgery  Former smoker quit in 1999  Allergy to penicillin  Allergy to aspirin but tolerates baby aspirin     Here for hospital follow-up.  Patient was recently in the emergency room in the [facility] hospital with chest pain and labile blood pressure, was diagnosed with new onset A. fib was started on Eliquis..  I received the records from [facility] EKG shows sinus rhythm, there is no documentation of atrial fibrillation.  Patient continues to have chest pain had relief with nitroglycerin in the emergency room.  Labs from [facility] 9/16/2022 reveal troponin mildly elevated hemoglobin 10.6 platelet count 71.  proBNP normal at 65.  Glucose elevated at 105.    Patient's arterial blood pressure is 173/59, heart rate 79, O2 sat of 99% on room air.     Patient work-up 4/12/2022 revealed normal troponin, normal proBNP at 138.  Normal CMP, PT PTT.  CBC with low white count at 2.8, hemoglobin at 11.4, platelet count of 64  Chest x-ray 4/12/2022 reveals no acute cardiopulmonary abnormality.  EKG reviewed by me from 4/12/2022 reveals sinus rhythm with sinus bradycardia at the rate of 57 bpm  Labs from 9/15/2022 reveal CBC with a hemoglobin of 10.6, normal CMP and BNP at 65.        Assessment:  :     Recurrent prolonged chest pain relieved with nitroglycerin consistent with unstable angina  CAD, MI, PCI  PAD history of  surgery  Hypertension  Dyslipidemia  Former smoker  Thrombocytopenia  Easy bruising  Questionable history of A. fib details of which are not available           Recommendations / Plan:          Patient is having chest pain on maximal medical management relieved with nitroglycerin consistent with unstable angina.  We will schedule her for cardiac cath.  Risk benefits alternatives explained.  Patient says she has allergy to aspirin but tolerates baby aspirin okay.    Since patient has thrombocytopenia will monitor closely.  Follow-up with hematology for thrombocytopenia.  Reviewed BMI over 31 counseled on walking exercise after to 40 minutes a day 5 days a week.  We will try to get rhythm strips from [place] to see if patient has A. fib.      Procedures EKG done 9/16/2022 reviewed/interpreted by me reveals sinus rhythm at the rate of 74 bpm    Copied text in this portion of the note has been reviewed and is accurate as of 9/22/2022  The following portions of the patient's history were reviewed and updated as appropriate: allergies, current medications, past family history, past medical history, past social history, past surgical history and problem list.    Assessment:         MDM     Diagnosis Plan   1. Coronary artery disease involving native coronary artery of native heart with unstable angina pectoris (HCC)  COVID PRE-OP / PRE-PROCEDURE SCREENING ORDER (NO ISOLATION) - Swab, Nasopharynx    Case Request Cath Lab: Left Heart Cath    CBC (No Diff)    Basic Metabolic Panel    Protime-INR    XR Chest 2 View   2. Essential hypertension  COVID PRE-OP / PRE-PROCEDURE SCREENING ORDER (NO ISOLATION) - Swab, Nasopharynx    Case Request Cath Lab: Left Heart Cath    CBC (No Diff)    Basic Metabolic Panel    Protime-INR    XR Chest 2 View   3. Dyslipidemia  COVID PRE-OP / PRE-PROCEDURE SCREENING ORDER (NO ISOLATION) - Swab, Nasopharynx    Case Request Cath Lab: Left Heart Cath    CBC (No Diff)    Basic Metabolic Panel     "Protime-INR    XR Chest 2 View   4. PAD (peripheral artery disease) (HCC)  COVID PRE-OP / PRE-PROCEDURE SCREENING ORDER (NO ISOLATION) - Swab, Nasopharynx    Case Request Cath Lab: Left Heart Cath    CBC (No Diff)    Basic Metabolic Panel    Protime-INR    XR Chest 2 View   5. Thrombocytopenia (HCC)  COVID PRE-OP / PRE-PROCEDURE SCREENING ORDER (NO ISOLATION) - Swab, Nasopharynx    Case Request Cath Lab: Left Heart Cath    CBC (No Diff)    Basic Metabolic Panel    Protime-INR    XR Chest 2 View   6. Easy bruising  COVID PRE-OP / PRE-PROCEDURE SCREENING ORDER (NO ISOLATION) - Swab, Nasopharynx    Case Request Cath Lab: Left Heart Cath    CBC (No Diff)    Basic Metabolic Panel    Protime-INR    XR Chest 2 View          Plan:               Past Medical History:  Past Medical History:   Diagnosis Date   • Hypertension    • Seasonal allergies      Past Surgical History:  Past Surgical History:   Procedure Laterality Date   • AORTA SURGERY     • BACK SURGERY     • CORONARY ANGIOPLASTY WITH STENT PLACEMENT  01/06/2022      Allergies:  Allergies   Allergen Reactions   • Aspirin Other (See Comments)     Pt reports \"makes my heart go crazy\"   • Codeine GI Intolerance   • Penicillins Hives   • Lisinopril Cough     Home Meds:  Current Meds:     Current Outpatient Medications:   •  aspirin 81 MG chewable tablet, Chew 81 mg Daily., Disp: , Rfl:   •  atorvastatin (LIPITOR) 20 MG tablet, Take 20 mg by mouth Daily., Disp: , Rfl:   •  carvedilol (COREG) 6.25 MG tablet, Take 6.25 mg by mouth 2 (Two) Times a Day With Meals., Disp: , Rfl:   •  clopidogrel (PLAVIX) 75 MG tablet, Take 75 mg by mouth Daily., Disp: , Rfl:   •  fluorometholone (FML) 0.1 % ophthalmic suspension, Administer 1 drop to both eyes 2 (Two) Times a Day., Disp: , Rfl:   •  fluticasone (FLONASE) 50 MCG/ACT nasal spray, 2 sprays into the nostril(s) as directed by provider Daily., Disp: , Rfl:   •  furosemide (LASIX) 20 MG tablet, TAKE 1 TABLET (20 MG) BY MOUTH IF " "NEEDED (SHORTNESS OF BREATH), Disp: , Rfl:   •  isosorbide mononitrate (IMDUR) 30 MG 24 hr tablet, Take 30 mg by mouth Daily., Disp: , Rfl:   •  loratadine (CLARITIN) 10 MG tablet, Take 10 mg by mouth Daily., Disp: , Rfl:   •  Multiple Vitamins-Minerals (Viteyes AREDS Formula) capsule, Take  by mouth., Disp: , Rfl:   •  nitroglycerin (NITROSTAT) 0.4 MG SL tablet, Place 0.4 mg under the tongue Every 5 (Five) Minutes As Needed for Chest Pain. Take no more than 3 doses in 15 minutes., Disp: , Rfl:   •  spironolactone (ALDACTONE) 25 MG tablet, Take 1 tablet by mouth Daily., Disp: 90 tablet, Rfl: 3  •  valsartan (DIOVAN) 40 MG tablet, Take 40 mg by mouth Daily., Disp: , Rfl:   Social History:   Social History     Tobacco Use   • Smoking status: Former Smoker     Packs/day: 2.00     Years: 40.00     Pack years: 80.00   • Smokeless tobacco: Never Used   Substance Use Topics   • Alcohol use: Never      Family History:  Family History   Problem Relation Age of Onset   • Heart disease Mother    • Heart disease Father    • Heart attack Father               Review of Systems   Cardiovascular: Positive for palpitations. Negative for chest pain and leg swelling.   Respiratory: Positive for shortness of breath.    Neurological: Positive for dizziness. Negative for numbness.     All other systems are negative         Objective:     Physical Exam  /59 (BP Location: Left arm, Patient Position: Sitting, Cuff Size: Large Adult)   Pulse 79   Ht 154.9 cm (61\")   Wt 75.8 kg (167 lb)   SpO2 99%   BMI 31.55 kg/m²   General:  Appears in no acute distress  Eyes: Sclera is anicteric,  conjunctiva is clear   HEENT:  No JVD.  No carotid bruits  Respiratory: Respirations regular and unlabored at rest.  Clear to auscultation  Cardiovascular: S1,S2 Regular rate and rhythm. No murmur, rub or gallop auscultated.   Extremities: No digital clubbing or cyanosis, no edema  Skin: Color pink. Skin warm and dry to touch. No rashes  No " "xanthoma  Neuro: Alert and awake.    Lab Reviewed:         Rodriguez Glass MD  9/22/2022 16:42 EDT      EMR Dragon/Transcription:   \"Dictated utilizing Dragon dictation\".        "

## 2022-09-23 ENCOUNTER — TELEPHONE (OUTPATIENT)
Dept: CARDIOLOGY | Facility: CLINIC | Age: 87
End: 2022-09-23

## 2022-09-23 ENCOUNTER — HOSPITAL ENCOUNTER (INPATIENT)
Facility: HOSPITAL | Age: 87
LOS: 1 days | Discharge: HOME | End: 2022-09-24
Attending: EMERGENCY MEDICINE | Admitting: INTERNAL MEDICINE

## 2022-09-23 ENCOUNTER — APPOINTMENT (OUTPATIENT)
Dept: GENERAL RADIOLOGY | Facility: HOSPITAL | Age: 87
End: 2022-09-23

## 2022-09-23 DIAGNOSIS — E78.5 DYSLIPIDEMIA: ICD-10-CM

## 2022-09-23 DIAGNOSIS — R94.31 ABNORMAL EKG: ICD-10-CM

## 2022-09-23 DIAGNOSIS — I10 ESSENTIAL HYPERTENSION: ICD-10-CM

## 2022-09-23 DIAGNOSIS — I25.110 CORONARY ARTERY DISEASE INVOLVING NATIVE CORONARY ARTERY OF NATIVE HEART WITH UNSTABLE ANGINA PECTORIS: ICD-10-CM

## 2022-09-23 DIAGNOSIS — R07.9 CHEST PAIN, UNSPECIFIED TYPE: ICD-10-CM

## 2022-09-23 DIAGNOSIS — I21.4 NON-STEMI (NON-ST ELEVATED MYOCARDIAL INFARCTION): Primary | ICD-10-CM

## 2022-09-23 DIAGNOSIS — I73.9 PAD (PERIPHERAL ARTERY DISEASE): ICD-10-CM

## 2022-09-23 DIAGNOSIS — D69.6 THROMBOCYTOPENIA: ICD-10-CM

## 2022-09-23 DIAGNOSIS — R06.09 DOE (DYSPNEA ON EXERTION): ICD-10-CM

## 2022-09-23 PROBLEM — I48.91 ATRIAL FIBRILLATION: Status: ACTIVE | Noted: 2022-09-23

## 2022-09-23 PROBLEM — R23.3 EASY BRUISING: Status: ACTIVE | Noted: 2022-09-23

## 2022-09-23 LAB
ACT BLD: 260 SECONDS (ref 89–137)
ALBUMIN SERPL-MCNC: 4.3 G/DL (ref 3.5–5.2)
ALBUMIN/GLOB SERPL: 1.9 G/DL
ALP SERPL-CCNC: 78 U/L (ref 39–117)
ALT SERPL W P-5'-P-CCNC: 16 U/L (ref 1–33)
ANION GAP SERPL CALCULATED.3IONS-SCNC: 9 MMOL/L (ref 5–15)
AST SERPL-CCNC: 17 U/L (ref 1–32)
BASOPHILS # BLD AUTO: 0 10*3/MM3 (ref 0–0.2)
BASOPHILS NFR BLD AUTO: 0.7 % (ref 0–1.5)
BILIRUB SERPL-MCNC: 0.5 MG/DL (ref 0–1.2)
BUN SERPL-MCNC: 23 MG/DL (ref 8–23)
BUN/CREAT SERPL: 27.1 (ref 7–25)
CALCIUM SPEC-SCNC: 8.8 MG/DL (ref 8.6–10.5)
CHLORIDE SERPL-SCNC: 104 MMOL/L (ref 98–107)
CO2 SERPL-SCNC: 25 MMOL/L (ref 22–29)
CREAT SERPL-MCNC: 0.85 MG/DL (ref 0.57–1)
DEPRECATED RDW RBC AUTO: 52.9 FL (ref 37–54)
EGFRCR SERPLBLD CKD-EPI 2021: 67.2 ML/MIN/1.73
EOSINOPHIL # BLD AUTO: 0 10*3/MM3 (ref 0–0.4)
EOSINOPHIL NFR BLD AUTO: 0.4 % (ref 0.3–6.2)
ERYTHROCYTE [DISTWIDTH] IN BLOOD BY AUTOMATED COUNT: 15.6 % (ref 12.3–15.4)
GLOBULIN UR ELPH-MCNC: 2.3 GM/DL
GLUCOSE SERPL-MCNC: 102 MG/DL (ref 65–99)
HCT VFR BLD AUTO: 35.8 % (ref 34–46.6)
HGB BLD-MCNC: 11.7 G/DL (ref 12–15.9)
HOLD SPECIMEN: NORMAL
INR PPP: 1.01 (ref 0.93–1.1)
LYMPHOCYTES # BLD AUTO: 1.2 10*3/MM3 (ref 0.7–3.1)
LYMPHOCYTES NFR BLD AUTO: 37.3 % (ref 19.6–45.3)
MCH RBC QN AUTO: 31.8 PG (ref 26.6–33)
MCHC RBC AUTO-ENTMCNC: 32.5 G/DL (ref 31.5–35.7)
MCV RBC AUTO: 97.8 FL (ref 79–97)
MONOCYTES # BLD AUTO: 0.5 10*3/MM3 (ref 0.1–0.9)
MONOCYTES NFR BLD AUTO: 15.2 % (ref 5–12)
NEUTROPHILS NFR BLD AUTO: 1.5 10*3/MM3 (ref 1.7–7)
NEUTROPHILS NFR BLD AUTO: 46.4 % (ref 42.7–76)
NRBC BLD AUTO-RTO: 0.1 /100 WBC (ref 0–0.2)
NT-PROBNP SERPL-MCNC: 863.7 PG/ML (ref 0–1800)
PLATELET # BLD AUTO: 88 10*3/MM3 (ref 140–450)
PMV BLD AUTO: 11.8 FL (ref 6–12)
POTASSIUM SERPL-SCNC: 4.1 MMOL/L (ref 3.5–5.2)
PROT SERPL-MCNC: 6.6 G/DL (ref 6–8.5)
PROTHROMBIN TIME: 10.4 SECONDS (ref 9.6–11.7)
RBC # BLD AUTO: 3.67 10*6/MM3 (ref 3.77–5.28)
SODIUM SERPL-SCNC: 138 MMOL/L (ref 136–145)
TROPONIN T SERPL-MCNC: 0.15 NG/ML (ref 0–0.03)
TROPONIN T SERPL-MCNC: 0.17 NG/ML (ref 0–0.03)
TSH SERPL DL<=0.05 MIU/L-ACNC: 1.24 UIU/ML (ref 0.27–4.2)
WBC NRBC COR # BLD: 3.2 10*3/MM3 (ref 3.4–10.8)
WHOLE BLOOD HOLD COAG: NORMAL
WHOLE BLOOD HOLD SPECIMEN: NORMAL

## 2022-09-23 PROCEDURE — 71045 X-RAY EXAM CHEST 1 VIEW: CPT

## 2022-09-23 PROCEDURE — 99152 MOD SED SAME PHYS/QHP 5/>YRS: CPT | Performed by: INTERNAL MEDICINE

## 2022-09-23 PROCEDURE — 99284 EMERGENCY DEPT VISIT MOD MDM: CPT

## 2022-09-23 PROCEDURE — 85610 PROTHROMBIN TIME: CPT | Performed by: PHYSICIAN ASSISTANT

## 2022-09-23 PROCEDURE — 99153 MOD SED SAME PHYS/QHP EA: CPT | Performed by: INTERNAL MEDICINE

## 2022-09-23 PROCEDURE — 80053 COMPREHEN METABOLIC PANEL: CPT | Performed by: PHYSICIAN ASSISTANT

## 2022-09-23 PROCEDURE — C1769 GUIDE WIRE: HCPCS | Performed by: INTERNAL MEDICINE

## 2022-09-23 PROCEDURE — B2111ZZ FLUOROSCOPY OF MULTIPLE CORONARY ARTERIES USING LOW OSMOLAR CONTRAST: ICD-10-PCS | Performed by: INTERNAL MEDICINE

## 2022-09-23 PROCEDURE — 93005 ELECTROCARDIOGRAM TRACING: CPT | Performed by: EMERGENCY MEDICINE

## 2022-09-23 PROCEDURE — C1894 INTRO/SHEATH, NON-LASER: HCPCS | Performed by: INTERNAL MEDICINE

## 2022-09-23 PROCEDURE — C1874 STENT, COATED/COV W/DEL SYS: HCPCS | Performed by: INTERNAL MEDICINE

## 2022-09-23 PROCEDURE — C1725 CATH, TRANSLUMIN NON-LASER: HCPCS | Performed by: INTERNAL MEDICINE

## 2022-09-23 PROCEDURE — 99222 1ST HOSP IP/OBS MODERATE 55: CPT | Performed by: INTERNAL MEDICINE

## 2022-09-23 PROCEDURE — 84484 ASSAY OF TROPONIN QUANT: CPT | Performed by: PHYSICIAN ASSISTANT

## 2022-09-23 PROCEDURE — 25010000002 MIDAZOLAM PER 1 MG: Performed by: INTERNAL MEDICINE

## 2022-09-23 PROCEDURE — 99221 1ST HOSP IP/OBS SF/LOW 40: CPT | Performed by: INTERNAL MEDICINE

## 2022-09-23 PROCEDURE — 36415 COLL VENOUS BLD VENIPUNCTURE: CPT | Performed by: PHYSICIAN ASSISTANT

## 2022-09-23 PROCEDURE — C9600 PERC DRUG-EL COR STENT SING: HCPCS | Performed by: INTERNAL MEDICINE

## 2022-09-23 PROCEDURE — 25010000002 HEPARIN (PORCINE) PER 1000 UNITS: Performed by: INTERNAL MEDICINE

## 2022-09-23 PROCEDURE — 85025 COMPLETE CBC W/AUTO DIFF WBC: CPT | Performed by: PHYSICIAN ASSISTANT

## 2022-09-23 PROCEDURE — 93454 CORONARY ARTERY ANGIO S&I: CPT | Performed by: INTERNAL MEDICINE

## 2022-09-23 PROCEDURE — 85347 COAGULATION TIME ACTIVATED: CPT

## 2022-09-23 PROCEDURE — 25010000002 FENTANYL CITRATE (PF) 100 MCG/2ML SOLUTION: Performed by: INTERNAL MEDICINE

## 2022-09-23 PROCEDURE — 0 IOPAMIDOL PER 1 ML: Performed by: INTERNAL MEDICINE

## 2022-09-23 PROCEDURE — 84443 ASSAY THYROID STIM HORMONE: CPT | Performed by: INTERNAL MEDICINE

## 2022-09-23 PROCEDURE — 83880 ASSAY OF NATRIURETIC PEPTIDE: CPT | Performed by: PHYSICIAN ASSISTANT

## 2022-09-23 PROCEDURE — 027034Z DILATION OF CORONARY ARTERY, ONE ARTERY WITH DRUG-ELUTING INTRALUMINAL DEVICE, PERCUTANEOUS APPROACH: ICD-10-PCS | Performed by: INTERNAL MEDICINE

## 2022-09-23 PROCEDURE — C1887 CATHETER, GUIDING: HCPCS | Performed by: INTERNAL MEDICINE

## 2022-09-23 PROCEDURE — 92928 PRQ TCAT PLMT NTRAC ST 1 LES: CPT | Performed by: INTERNAL MEDICINE

## 2022-09-23 DEVICE — XIENCE SKYPOINT™ EVEROLIMUS ELUTING CORONARY STENT SYSTEM 3.00 MM X 23 MM / RAPID-EXCHANGE
Type: IMPLANTABLE DEVICE | Site: CORONARY | Status: FUNCTIONAL
Brand: XIENCE SKYPOINT™

## 2022-09-23 RX ORDER — SODIUM CHLORIDE 0.9 % (FLUSH) 0.9 %
10 SYRINGE (ML) INJECTION AS NEEDED
Status: DISCONTINUED | OUTPATIENT
Start: 2022-09-23 | End: 2022-09-24 | Stop reason: HOSPADM

## 2022-09-23 RX ORDER — ATORVASTATIN CALCIUM 20 MG/1
20 TABLET, FILM COATED ORAL DAILY
Status: DISCONTINUED | OUTPATIENT
Start: 2022-09-23 | End: 2022-09-24 | Stop reason: HOSPADM

## 2022-09-23 RX ORDER — ACETAMINOPHEN 325 MG/1
650 TABLET ORAL EVERY 4 HOURS PRN
Status: DISCONTINUED | OUTPATIENT
Start: 2022-09-23 | End: 2022-09-23 | Stop reason: SDUPTHER

## 2022-09-23 RX ORDER — SPIRONOLACTONE 25 MG/1
25 TABLET ORAL DAILY
Status: DISCONTINUED | OUTPATIENT
Start: 2022-09-23 | End: 2022-09-24 | Stop reason: HOSPADM

## 2022-09-23 RX ORDER — SODIUM CHLORIDE 0.9 % (FLUSH) 0.9 %
10 SYRINGE (ML) INJECTION EVERY 12 HOURS SCHEDULED
Status: DISCONTINUED | OUTPATIENT
Start: 2022-09-23 | End: 2022-09-24 | Stop reason: HOSPADM

## 2022-09-23 RX ORDER — NICARDIPINE HYDROCHLORIDE 2.5 MG/ML
INJECTION INTRAVENOUS AS NEEDED
Status: DISCONTINUED | OUTPATIENT
Start: 2022-09-23 | End: 2022-09-23 | Stop reason: HOSPADM

## 2022-09-23 RX ORDER — LIDOCAINE HYDROCHLORIDE 20 MG/ML
INJECTION, SOLUTION INFILTRATION; PERINEURAL AS NEEDED
Status: DISCONTINUED | OUTPATIENT
Start: 2022-09-23 | End: 2022-09-23 | Stop reason: HOSPADM

## 2022-09-23 RX ORDER — ISOSORBIDE MONONITRATE 30 MG/1
30 TABLET, EXTENDED RELEASE ORAL DAILY
Status: DISCONTINUED | OUTPATIENT
Start: 2022-09-23 | End: 2022-09-24 | Stop reason: HOSPADM

## 2022-09-23 RX ORDER — NITROGLYCERIN 0.4 MG/1
0.4 TABLET SUBLINGUAL
Status: DISCONTINUED | OUTPATIENT
Start: 2022-09-23 | End: 2022-09-24 | Stop reason: HOSPADM

## 2022-09-23 RX ORDER — NITROGLYCERIN 5 MG/ML
INJECTION, SOLUTION INTRAVENOUS AS NEEDED
Status: DISCONTINUED | OUTPATIENT
Start: 2022-09-23 | End: 2022-09-23 | Stop reason: HOSPADM

## 2022-09-23 RX ORDER — CLOPIDOGREL BISULFATE 75 MG/1
75 TABLET ORAL DAILY
Status: DISCONTINUED | OUTPATIENT
Start: 2022-09-23 | End: 2022-09-24 | Stop reason: HOSPADM

## 2022-09-23 RX ORDER — METOPROLOL TARTRATE 5 MG/5ML
INJECTION INTRAVENOUS AS NEEDED
Status: DISCONTINUED | OUTPATIENT
Start: 2022-09-23 | End: 2022-09-23 | Stop reason: HOSPADM

## 2022-09-23 RX ORDER — ALUMINA, MAGNESIA, AND SIMETHICONE 2400; 2400; 240 MG/30ML; MG/30ML; MG/30ML
15 SUSPENSION ORAL EVERY 6 HOURS PRN
Status: DISCONTINUED | OUTPATIENT
Start: 2022-09-23 | End: 2022-09-24 | Stop reason: HOSPADM

## 2022-09-23 RX ORDER — CHOLECALCIFEROL (VITAMIN D3) 125 MCG
5 CAPSULE ORAL NIGHTLY PRN
Status: DISCONTINUED | OUTPATIENT
Start: 2022-09-23 | End: 2022-09-24 | Stop reason: HOSPADM

## 2022-09-23 RX ORDER — ACETAMINOPHEN 325 MG/1
650 TABLET ORAL EVERY 4 HOURS PRN
Status: DISCONTINUED | OUTPATIENT
Start: 2022-09-23 | End: 2022-09-24 | Stop reason: HOSPADM

## 2022-09-23 RX ORDER — ONDANSETRON 2 MG/ML
4 INJECTION INTRAMUSCULAR; INTRAVENOUS EVERY 6 HOURS PRN
Status: DISCONTINUED | OUTPATIENT
Start: 2022-09-23 | End: 2022-09-24 | Stop reason: HOSPADM

## 2022-09-23 RX ORDER — FENTANYL CITRATE 50 UG/ML
INJECTION, SOLUTION INTRAMUSCULAR; INTRAVENOUS AS NEEDED
Status: DISCONTINUED | OUTPATIENT
Start: 2022-09-23 | End: 2022-09-23 | Stop reason: HOSPADM

## 2022-09-23 RX ORDER — CARVEDILOL 6.25 MG/1
6.25 TABLET ORAL 2 TIMES DAILY WITH MEALS
Status: DISCONTINUED | OUTPATIENT
Start: 2022-09-23 | End: 2022-09-24 | Stop reason: HOSPADM

## 2022-09-23 RX ORDER — ASPIRIN 81 MG/1
81 TABLET, CHEWABLE ORAL DAILY
Status: DISCONTINUED | OUTPATIENT
Start: 2022-09-23 | End: 2022-09-24 | Stop reason: HOSPADM

## 2022-09-23 RX ORDER — HEPARIN SODIUM 1000 [USP'U]/ML
INJECTION, SOLUTION INTRAVENOUS; SUBCUTANEOUS AS NEEDED
Status: DISCONTINUED | OUTPATIENT
Start: 2022-09-23 | End: 2022-09-23 | Stop reason: HOSPADM

## 2022-09-23 RX ORDER — ONDANSETRON 4 MG/1
4 TABLET, FILM COATED ORAL EVERY 6 HOURS PRN
Status: DISCONTINUED | OUTPATIENT
Start: 2022-09-23 | End: 2022-09-24 | Stop reason: HOSPADM

## 2022-09-23 RX ORDER — VALSARTAN 80 MG/1
80 TABLET ORAL DAILY
Status: DISCONTINUED | OUTPATIENT
Start: 2022-09-23 | End: 2022-09-24 | Stop reason: HOSPADM

## 2022-09-23 RX ORDER — SODIUM CHLORIDE 9 MG/ML
INJECTION, SOLUTION INTRAVENOUS CONTINUOUS PRN
Status: COMPLETED | OUTPATIENT
Start: 2022-09-23 | End: 2022-09-23

## 2022-09-23 RX ORDER — FLUOROMETHOLONE 0.1 %
1 SUSPENSION, DROPS(FINAL DOSAGE FORM)(ML) OPHTHALMIC (EYE) 2 TIMES DAILY
Status: DISCONTINUED | OUTPATIENT
Start: 2022-09-23 | End: 2022-09-23

## 2022-09-23 RX ORDER — MIDAZOLAM HYDROCHLORIDE 1 MG/ML
INJECTION INTRAMUSCULAR; INTRAVENOUS AS NEEDED
Status: DISCONTINUED | OUTPATIENT
Start: 2022-09-23 | End: 2022-09-23 | Stop reason: HOSPADM

## 2022-09-23 RX ORDER — ACETAMINOPHEN 650 MG/1
650 SUPPOSITORY RECTAL EVERY 4 HOURS PRN
Status: DISCONTINUED | OUTPATIENT
Start: 2022-09-23 | End: 2022-09-24 | Stop reason: HOSPADM

## 2022-09-23 RX ORDER — ACETAMINOPHEN 160 MG/5ML
650 SOLUTION ORAL EVERY 4 HOURS PRN
Status: DISCONTINUED | OUTPATIENT
Start: 2022-09-23 | End: 2022-09-24 | Stop reason: HOSPADM

## 2022-09-23 RX ORDER — SODIUM CHLORIDE 9 MG/ML
100 INJECTION, SOLUTION INTRAVENOUS CONTINUOUS
Status: DISCONTINUED | OUTPATIENT
Start: 2022-09-23 | End: 2022-09-24 | Stop reason: HOSPADM

## 2022-09-23 RX ORDER — VALSARTAN 80 MG/1
40 TABLET ORAL DAILY
COMMUNITY

## 2022-09-23 RX ADMIN — ISOSORBIDE MONONITRATE 30 MG: 30 TABLET, EXTENDED RELEASE ORAL at 15:15

## 2022-09-23 RX ADMIN — ASPIRIN 81 MG: 81 TABLET, CHEWABLE ORAL at 15:15

## 2022-09-23 RX ADMIN — SPIRONOLACTONE 25 MG: 25 TABLET ORAL at 15:15

## 2022-09-23 RX ADMIN — ATORVASTATIN CALCIUM 20 MG: 20 TABLET, FILM COATED ORAL at 15:15

## 2022-09-23 RX ADMIN — VALSARTAN 80 MG: 80 TABLET, FILM COATED ORAL at 18:15

## 2022-09-23 RX ADMIN — CARVEDILOL 6.25 MG: 6.25 TABLET, FILM COATED ORAL at 18:15

## 2022-09-23 RX ADMIN — SODIUM CHLORIDE 100 ML/HR: 9 INJECTION, SOLUTION INTRAVENOUS at 15:15

## 2022-09-23 RX ADMIN — Medication 10 ML: at 20:21

## 2022-09-23 RX ADMIN — CLOPIDOGREL BISULFATE 75 MG: 75 TABLET ORAL at 15:15

## 2022-09-23 NOTE — TELEPHONE ENCOUNTER
PATIENT HAD TO TAKE 3 NITRO LAST NIGHT. NOT LOOKING TO GOOD THIS MORNING. PATIENT DID NOT TELL SON UNTIL THIS MORNING.  SHE HAS LIFE ALERT, BUT WILL NOT PUSH THE BUTTON WHEN SHE NEEDS TOO. MEGHA ASKED WHAT THEY SHOULD DO.  I TALKED TO MIGUEL AND ER WAS ADVISED. MEGHA UNDERSTOOD. GOING TO BRING HER TO [facility].

## 2022-09-24 ENCOUNTER — READMISSION MANAGEMENT (OUTPATIENT)
Dept: CALL CENTER | Facility: HOSPITAL | Age: 87
End: 2022-09-24

## 2022-09-24 VITALS
DIASTOLIC BLOOD PRESSURE: 44 MMHG | HEIGHT: 61 IN | WEIGHT: 167 LBS | RESPIRATION RATE: 16 BRPM | TEMPERATURE: 97.8 F | BODY MASS INDEX: 31.53 KG/M2 | OXYGEN SATURATION: 97 % | HEART RATE: 74 BPM | SYSTOLIC BLOOD PRESSURE: 117 MMHG

## 2022-09-24 PROBLEM — I21.4 NSTEMI (NON-ST ELEVATED MYOCARDIAL INFARCTION): Status: RESOLVED | Noted: 2022-09-23 | Resolved: 2022-09-24

## 2022-09-24 LAB
ANION GAP SERPL CALCULATED.3IONS-SCNC: 9 MMOL/L (ref 5–15)
BASOPHILS # BLD AUTO: 0 10*3/MM3 (ref 0–0.2)
BASOPHILS NFR BLD AUTO: 0.4 % (ref 0–1.5)
BUN SERPL-MCNC: 22 MG/DL (ref 8–23)
BUN/CREAT SERPL: 30.1 (ref 7–25)
CALCIUM SPEC-SCNC: 8.1 MG/DL (ref 8.6–10.5)
CHLORIDE SERPL-SCNC: 106 MMOL/L (ref 98–107)
CHOLEST SERPL-MCNC: 106 MG/DL (ref 0–200)
CO2 SERPL-SCNC: 23 MMOL/L (ref 22–29)
CREAT SERPL-MCNC: 0.73 MG/DL (ref 0.57–1)
DEPRECATED RDW RBC AUTO: 50.8 FL (ref 37–54)
EGFRCR SERPLBLD CKD-EPI 2021: 80.7 ML/MIN/1.73
EOSINOPHIL # BLD AUTO: 0 10*3/MM3 (ref 0–0.4)
EOSINOPHIL NFR BLD AUTO: 0.4 % (ref 0.3–6.2)
ERYTHROCYTE [DISTWIDTH] IN BLOOD BY AUTOMATED COUNT: 15.2 % (ref 12.3–15.4)
GLUCOSE SERPL-MCNC: 90 MG/DL (ref 65–99)
HCT VFR BLD AUTO: 29.8 % (ref 34–46.6)
HDLC SERPL-MCNC: 48 MG/DL (ref 40–60)
HGB BLD-MCNC: 9.9 G/DL (ref 12–15.9)
LDLC SERPL CALC-MCNC: 43 MG/DL (ref 0–100)
LDLC/HDLC SERPL: 0.9 {RATIO}
LYMPHOCYTES # BLD AUTO: 1.1 10*3/MM3 (ref 0.7–3.1)
LYMPHOCYTES NFR BLD AUTO: 39.6 % (ref 19.6–45.3)
MCH RBC QN AUTO: 31.6 PG (ref 26.6–33)
MCHC RBC AUTO-ENTMCNC: 33.2 G/DL (ref 31.5–35.7)
MCV RBC AUTO: 95.1 FL (ref 79–97)
MONOCYTES # BLD AUTO: 0.6 10*3/MM3 (ref 0.1–0.9)
MONOCYTES NFR BLD AUTO: 21.5 % (ref 5–12)
NEUTROPHILS NFR BLD AUTO: 1.1 10*3/MM3 (ref 1.7–7)
NEUTROPHILS NFR BLD AUTO: 38.1 % (ref 42.7–76)
NRBC BLD AUTO-RTO: 0.1 /100 WBC (ref 0–0.2)
PLATELET # BLD AUTO: 59 10*3/MM3 (ref 140–450)
PMV BLD AUTO: 11.9 FL (ref 6–12)
POTASSIUM SERPL-SCNC: 4 MMOL/L (ref 3.5–5.2)
RBC # BLD AUTO: 3.14 10*6/MM3 (ref 3.77–5.28)
SODIUM SERPL-SCNC: 138 MMOL/L (ref 136–145)
TRIGL SERPL-MCNC: 74 MG/DL (ref 0–150)
VLDLC SERPL-MCNC: 15 MG/DL (ref 5–40)
WBC NRBC COR # BLD: 2.8 10*3/MM3 (ref 3.4–10.8)

## 2022-09-24 PROCEDURE — 99232 SBSQ HOSP IP/OBS MODERATE 35: CPT | Performed by: INTERNAL MEDICINE

## 2022-09-24 PROCEDURE — 80048 BASIC METABOLIC PNL TOTAL CA: CPT | Performed by: INTERNAL MEDICINE

## 2022-09-24 PROCEDURE — 80061 LIPID PANEL: CPT | Performed by: INTERNAL MEDICINE

## 2022-09-24 PROCEDURE — 85025 COMPLETE CBC W/AUTO DIFF WBC: CPT | Performed by: NURSE PRACTITIONER

## 2022-09-24 PROCEDURE — 93005 ELECTROCARDIOGRAM TRACING: CPT | Performed by: INTERNAL MEDICINE

## 2022-09-24 PROCEDURE — 83036 HEMOGLOBIN GLYCOSYLATED A1C: CPT | Performed by: INTERNAL MEDICINE

## 2022-09-24 PROCEDURE — 93010 ELECTROCARDIOGRAM REPORT: CPT | Performed by: INTERNAL MEDICINE

## 2022-09-24 RX ADMIN — SPIRONOLACTONE 25 MG: 25 TABLET ORAL at 09:03

## 2022-09-24 RX ADMIN — ASPIRIN 81 MG: 81 TABLET, CHEWABLE ORAL at 09:03

## 2022-09-24 RX ADMIN — SODIUM CHLORIDE 100 ML/HR: 9 INJECTION, SOLUTION INTRAVENOUS at 06:28

## 2022-09-24 RX ADMIN — ISOSORBIDE MONONITRATE 30 MG: 30 TABLET, EXTENDED RELEASE ORAL at 09:03

## 2022-09-24 RX ADMIN — CLOPIDOGREL BISULFATE 75 MG: 75 TABLET ORAL at 09:03

## 2022-09-24 RX ADMIN — ATORVASTATIN CALCIUM 20 MG: 20 TABLET, FILM COATED ORAL at 09:03

## 2022-09-24 RX ADMIN — Medication 10 ML: at 09:04

## 2022-09-24 RX ADMIN — VALSARTAN 80 MG: 80 TABLET, FILM COATED ORAL at 09:03

## 2022-09-24 RX ADMIN — CARVEDILOL 6.25 MG: 6.25 TABLET, FILM COATED ORAL at 09:03

## 2022-09-25 NOTE — OUTREACH NOTE
Prep Survey    Flowsheet Row Responses   [facility]   Is LACE score < 7 ? No   Emergency Room discharge w/ pulse ox? No   Eligibility Readm Mgmt   Discharge diagnosis NSTEMI   Does the patient have one of the following disease processes/diagnoses(primary or secondary)? Acute MI (STEMI,NSTEMI)   Does the patient have Home health ordered? No   Is there a DME ordered? No   Prep survey completed? Yes          CHADD LAMBERT - Registered Nurse

## 2022-09-26 ENCOUNTER — TELEPHONE (OUTPATIENT)
Dept: ONCOLOGY | Facility: CLINIC | Age: 87
End: 2022-09-26

## 2022-09-26 LAB — HBA1C MFR BLD: 6 % (ref 3.5–5.6)

## 2022-09-27 ENCOUNTER — READMISSION MANAGEMENT (OUTPATIENT)
Dept: CALL CENTER | Facility: HOSPITAL | Age: 87
End: 2022-09-27

## 2022-09-27 NOTE — OUTREACH NOTE
AMI Week 1 Survey    Flowsheet Row Responses   [facility] discharged [facility]? [facility]   Does the patient have one of the following disease processes/diagnoses(primary or secondary)? Acute MI (STEMI,NSTEMI)   Week 1 attempt successful? No   Unsuccessful attempts Attempt 1          FREDDIE BRITT - Registered Nurse

## 2022-09-29 ENCOUNTER — READMISSION MANAGEMENT (OUTPATIENT)
Dept: CALL CENTER | Facility: HOSPITAL | Age: 87
End: 2022-09-29

## 2022-09-29 NOTE — OUTREACH NOTE
AMI Week 1 Survey    Flowsheet Row Responses   [facility] discharged [facility]   Does the patient have one of the following disease processes/diagnoses(primary or secondary)? Acute MI (STEMI,NSTEMI)   Week 1 attempt successful? No   Unsuccessful attempts Attempt 2          CHIN GUSMAN - Registered Nurse

## 2022-09-30 ENCOUNTER — READMISSION MANAGEMENT (OUTPATIENT)
Dept: CALL CENTER | Facility: HOSPITAL | Age: 87
End: 2022-09-30

## 2022-09-30 NOTE — OUTREACH NOTE
AMI Week 1 Survey    Flowsheet Row Responses   [facility] [place] patient discharged from? [facility]   Does the patient have one of the following disease processes/diagnoses(primary or secondary)? Acute MI (STEMI,NSTEMI)   Week 1 attempt successful? Yes   Call start time 1537   Call end time 1546   Person spoke with today (if not patient) and relationship Michael-son.   Meds reviewed with patient/caregiver? Yes   Is the patient having any side effects they believe may be caused by any medication additions or changes? No   Does the patient have all prescriptions related to this admission filled (includes statins,anticoagulants,HTN meds,anti-arrhythmia meds) N/A   Is the patient taking all medications as directed (includes completed medication regime)? Yes   Comments regarding appointments Has kept appt with her PCP. Cardiology appt 10/31/22.   Does the patient have a primary care provider?  Yes   Does the patient have an appointment with their PCP,cardiologist,or clinic within 7 days of discharge? Yes   Has the patient kept scheduled appointments due by today? Yes   Has home health visited the patient within 72 hours of discharge? N/A   Psychosocial issues? No   Psychosocial comments Lives alone. Has [employer] button. Lives six doors down from son.   Did the patient receive a copy of their discharge instructions? Yes   Nursing interventions Reviewed instructions with patient   What is the patient's perception of their health status since discharge? Improving   Nursing interventions Nurse provided patient education   Is the patient/caregiver able to teach back signs and symptoms of when to call for help immediately: Sudden chest discomfort, Sudden sweating or clammy skin, Sudden discomfort in arms, back, neck or jaw, Nausea or vomiting, Shortness of breath at any time, Dizziness or lightheadedness, Irregular or rapid heart rate   Nursing interventions Nurse provided patient education   Is the pateint /caregiver able to teach  back the importance of cardiac rehab? Yes  [Son states patient walks about one mile per day with rollator. Son states believes will not do cardiac rehab.]   Nursing interventions Provided education on importance of cardiac rehab   Is the patient/caregiver able to teach back lifestyle changes to help prevent MIs Regular exercise as approved by provider, Heart healthy diet, Reducing stress   Is the patient/caregiver able to teach back ways to prevent a second heart attack: Take medications, Follow up with MD, Manage risk factors   If the patient is a current smoker, are they able to teach back resources for cessation? Not a smoker   Is the patient/caregiver able to teach back the hierarchy of who to call/visit for symptoms/problems? PCP, Specialist, Home health nurse, Urgent Care, ED, 911 Yes   Week 1 call completed? Yes   Wrap up additional comments Son states patient is improving. Denies any edema. States monitors BP at home-reports systolic at 115. Denies any cardiac s/s. Denies any needs today.          GIUSEPPE REYNOLDS - Registered Nurse

## 2022-10-01 LAB — QT INTERVAL: 365 MS

## 2022-10-03 LAB — QT INTERVAL: 349 MS

## 2022-10-10 ENCOUNTER — TELEPHONE (OUTPATIENT)
Dept: CARDIOLOGY | Facility: CLINIC | Age: 87
End: 2022-10-10

## 2022-10-10 NOTE — TELEPHONE ENCOUNTER
Per Dr Glass  Cut Valsartan 80mg in half daily     Called pt, she said to call her son   Called pt's son Michael JOSSELIN

## 2022-10-10 NOTE — TELEPHONE ENCOUNTER
PATIENT'S BP HAS BEEN RUNNING LOW AND SHE HAS BEEN GETTING DIZZY. WANTS TO KNOW IF SHE NEEDS TO DISCONTINUE ONE OF HER BP MEDS. ALSO HER PCP TOLD HER TO WALK IF SHE FEELS LIKE IT BUT WITH HER DIZZINESS HER SON DOESN'T THINK SHE SHOULD.

## 2022-10-11 ENCOUNTER — READMISSION MANAGEMENT (OUTPATIENT)
Dept: CALL CENTER | Facility: HOSPITAL | Age: 87
End: 2022-10-11

## 2022-10-11 NOTE — OUTREACH NOTE
AMI Week 3 Survey    Flowsheet Row Responses   [facility]? [facility]   Does the patient have one of the following disease processes/diagnoses(primary or secondary)? Acute MI (STEMI,NSTEMI)   Week 3 attempt successful? No   Unsuccessful attempts Attempt 1          NICOLE KU - Licensed Nurse

## 2022-10-13 ENCOUNTER — TELEPHONE (OUTPATIENT)
Dept: CARDIOLOGY | Facility: CLINIC | Age: 87
End: 2022-10-13

## 2022-10-13 DIAGNOSIS — R60.0 BILATERAL LEG EDEMA: Primary | ICD-10-CM

## 2022-10-13 NOTE — TELEPHONE ENCOUNTER
Per Dr. Glass, needs a vascular referral. Pt made aware. She was advised to contact the office if symptoms worsen.

## 2022-10-13 NOTE — TELEPHONE ENCOUNTER
Pt contacted. Leg swelling L>R, started today. Using Spironolactone 25 mg and Lasix 20 mg daily. Some SOB.

## 2022-10-18 ENCOUNTER — READMISSION MANAGEMENT (OUTPATIENT)
Dept: CALL CENTER | Facility: HOSPITAL | Age: 87
End: 2022-10-18

## 2022-10-18 NOTE — OUTREACH NOTE
AMI Week 4 Survey    Flowsheet Row Responses   [facility] discharged from? [facility]   Does the patient have one of the following disease processes/diagnoses(primary or secondary)? Acute MI (STEMI,NSTEMI)   Week 4 attempt successful? Yes   Call start time 1424   Call end time 1425   Discharge diagnosis NSTEMI   Is the patient taking all medications as directed (includes completed medication regime)? Yes   Has the patient kept scheduled appointments due by today? N/A   Comments will see cardiology on 10/31 and says she has a f/u with PCP   Is the patient still receiving Home Health Services? N/A   Psychosocial issues? No   What is the patient's perception of their health status since discharge? Improving   Nursing interventions Nurse provided patient education   Is the patient/caregiver able to teach back signs and symptoms of when to call for help immediately: Irregular or rapid heart rate, Dizziness or lightheadedness, Nausea or vomiting, Sudden sweating or clammy skin, Shortness of breath at any time, Sudden discomfort in arms, back, neck or jaw, Sudden chest discomfort   Nursing interventions Nurse provided patient education   Is the patient/caregiver able to teach back ways to prevent a second heart attack: Take medications, Follow up with MD   Is the patient/caregiver able to teach back the hierarchy of who to call/visit for symptoms/problems? PCP, Specialist, Home health nurse, Urgent Care, ED, 911 Yes   Week 4 call completed? Yes   Would the patient like one additional call? No   Graduated Yes   Is the patient interested in additional calls from an ambulatory ?  NOTE:  applies to high risk patients requiring additional follow-up. No   Did the patient feel the follow up calls were helpful during their recovery period? Yes   Was the number of calls appropriate? Yes   Wrap up additional comments Doing well, no questions.          ANDREW MANRIQUE - Registered Nurse

## 2022-10-31 ENCOUNTER — OFFICE VISIT (OUTPATIENT)
Dept: CARDIOLOGY | Facility: CLINIC | Age: 87
End: 2022-10-31

## 2022-10-31 VITALS
WEIGHT: 169 LBS | SYSTOLIC BLOOD PRESSURE: 171 MMHG | HEART RATE: 70 BPM | DIASTOLIC BLOOD PRESSURE: 60 MMHG | BODY MASS INDEX: 31.91 KG/M2 | HEIGHT: 61 IN | OXYGEN SATURATION: 98 %

## 2022-10-31 DIAGNOSIS — I10 ESSENTIAL HYPERTENSION: ICD-10-CM

## 2022-10-31 DIAGNOSIS — I73.9 PAD (PERIPHERAL ARTERY DISEASE): ICD-10-CM

## 2022-10-31 DIAGNOSIS — E78.5 DYSLIPIDEMIA: ICD-10-CM

## 2022-10-31 DIAGNOSIS — Z98.61 CAD S/P PERCUTANEOUS CORONARY ANGIOPLASTY: Primary | ICD-10-CM

## 2022-10-31 DIAGNOSIS — I25.2 HISTORY OF MYOCARDIAL INFARCTION: ICD-10-CM

## 2022-10-31 DIAGNOSIS — I25.10 CAD S/P PERCUTANEOUS CORONARY ANGIOPLASTY: Primary | ICD-10-CM

## 2022-10-31 DIAGNOSIS — R73.03 PREDIABETES: ICD-10-CM

## 2022-10-31 PROCEDURE — 93000 ELECTROCARDIOGRAM COMPLETE: CPT | Performed by: INTERNAL MEDICINE

## 2022-10-31 PROCEDURE — 99214 OFFICE O/P EST MOD 30 MIN: CPT | Performed by: INTERNAL MEDICINE

## 2022-10-31 NOTE — PROGRESS NOTES
Subjective:     Encounter Date:10/31/2022      Patient ID: Tricia Marroquin is a 87 y.o. female.    Chief Complaint : Follow-up for CAD, PCI, hypertension, dyslipidemia     History of Present Illness      Acute visit for chest pain and new diagnosed A. fib, history of CAD, PCI, hypertension, dyslipidemia            Ms. Tricia Marroquin has PMH of     CAD, stent to LCx 5/12/2016, NSTEMI 9/3/2022 and PCI to RCA, PCI 1/6/2022, cath 9/23/2022 VALENTINO to in-stent RCA stenosis  Questionable paroxysmal A. fib, no documentation found  Dyspnea due to Brilinta  Hypertension  Dyslipidemia  PAD, aortobifem 10/5/2007, right common femoral arthrectomy with patch angioplasty 12/2008  Thrombocytopenia  JELENA, back surgery, aorta surgery  Former smoker quit in 1999  Allergy to penicillin  Allergy to aspirin but tolerates baby aspirin     Here for hospital follow-up.  Patient is feeling tired denies any chest pain.    Patient's arterial blood pressure is 171/60, heart rate 70, O2 sat of 98% on room air.    Patient was recently sent from [facility] 9/22/2022 with questionable diagnosis of A. fib.  Review of records from [facility] revealed patient was in sinus arrhythmia and PACs but no inocencio A. fib.  Patient was having chest pain at that time therefore was admitted to the hospital      Labs from [facility] 9/16/2022 reveal troponin mildly elevated hemoglobin 10.6 platelet count 71.  proBNP normal at 65.  Glucose elevated at 105.  Patient work-up 4/12/2022 revealed normal troponin, normal proBNP at 138.  Normal CMP, PT PTT.  CBC with low white count at 2.8, hemoglobin at 11.4, platelet count of 64  Chest x-ray 4/12/2022 reveals no acute cardiopulmonary abnormality.  EKG reviewed by me from 4/12/2022 reveals sinus rhythm with sinus bradycardia at the rate of 57 bpm  Labs from 9/15/2022 reveal CBC with a hemoglobin of 10.6, normal CMP and BNP at 65.  Labs from 9/24/2022 revealed A1c of 6.  Lipid profile with cholesterol 106, triglycerides 74, HDL 48,  LDL 43        Assessment:  :       CAD, MI, PCI  PAD history of surgery  Hypertension  Dyslipidemia  Former smoker  Thrombocytopenia  Easy bruising  Questionable history of A. fib details of which are not available  Prediabetes, labs from 9/24/2022 reveal A1c of 6        Recommendations / Plan:          Patient underwent cath and drug-eluting stent in-stent stenosis.  Will continue medical management with dual antiplatelet with aspirin and Plavix as tolerated at least for a year.  Will continue aspirin atorvastatin, clopidogrel, carvedilol, isosorbide, valsartan, Aldactone, furosemide as tolerated  Reviewed BMI over 30, counseled on weight loss diet and exercise.  Follow-up with hematology for thrombocytopenia.  Reviewed EKG results with patient.  We will send her to cardiac rehab, patient does not want to go.      ECG 12 Lead    Date/Time: 10/31/2022 4:52 PM  Performed by: Rodriguez Glass MD  Authorized by: Rodriguez Glass MD   Comparison: compared with previous ECG from 9/24/2022  Comparison to previous ECG: EKG done today reviewed/interpreted by me reveals sinus rhythm with rate of 67 bpm, no new change compared to EKG from 9/24/2020              Copied text in this portion of the note has been reviewed and is accurate as of 10/31/2022  The following portions of the patient's history were reviewed and updated as appropriate: allergies, current medications, past family history, past medical history, past social history, past surgical history and problem list.    Assessment:         MDM     Diagnosis Plan   1. CAD S/P percutaneous coronary angioplasty        2. History of myocardial infarction        3. Essential hypertension        4. Dyslipidemia        5. PAD (peripheral artery disease) (HCC)        6. Prediabetes               Plan:               Past Medical History:  Past Medical History:   Diagnosis Date   • Hypertension    • Seasonal allergies      Past Surgical History:  Past Surgical  "History:   Procedure Laterality Date   • AORTA SURGERY     • BACK SURGERY     • CARDIAC CATHETERIZATION N/A 9/23/2022    Procedure: Left Heart Cath;  Surgeon: Rodriguez Glass MD;  Location: [facility] CATH INVASIVE LOCATION;  Service: Cardiovascular;  Laterality: N/A;   • CORONARY ANGIOPLASTY WITH STENT PLACEMENT  01/06/2022      Allergies:  Allergies   Allergen Reactions   • Aspirin Other (See Comments)     Pt reports \"makes my heart go crazy\"   • Codeine GI Intolerance   • Penicillins Hives   • Lisinopril Cough     Home Meds:  Current Meds:     Current Outpatient Medications:   •  aspirin 81 MG chewable tablet, Chew 81 mg Daily., Disp: , Rfl:   •  atorvastatin (LIPITOR) 20 MG tablet, Take 20 mg by mouth Daily., Disp: , Rfl:   •  carvedilol (COREG) 6.25 MG tablet, Take 6.25 mg by mouth 2 (Two) Times a Day With Meals., Disp: , Rfl:   •  clopidogrel (PLAVIX) 75 MG tablet, Take 75 mg by mouth Daily., Disp: , Rfl:   •  fluorometholone (FML) 0.1 % ophthalmic suspension, Administer 1 drop to both eyes 2 (Two) Times a Day., Disp: , Rfl:   •  fluticasone (FLONASE) 50 MCG/ACT nasal spray, 2 sprays into the nostril(s) as directed by provider Daily., Disp: , Rfl:   •  furosemide (LASIX) 20 MG tablet, TAKE 1 TABLET (20 MG) BY MOUTH IF NEEDED (SHORTNESS OF BREATH), Disp: , Rfl:   •  isosorbide mononitrate (IMDUR) 30 MG 24 hr tablet, Take 30 mg by mouth Daily., Disp: , Rfl:   •  loratadine (CLARITIN) 10 MG tablet, Take 10 mg by mouth Daily., Disp: , Rfl:   •  Multiple Vitamins-Minerals (Viteyes AREDS Formula) capsule, Take  by mouth., Disp: , Rfl:   •  nitroglycerin (NITROSTAT) 0.4 MG SL tablet, Place 0.4 mg under the tongue Every 5 (Five) Minutes As Needed for Chest Pain. Take no more than 3 doses in 15 minutes., Disp: , Rfl:   •  spironolactone (ALDACTONE) 25 MG tablet, Take 1 tablet by mouth Daily., Disp: 90 tablet, Rfl: 3  •  valsartan (DIOVAN) 80 MG tablet, Take 40 mg by mouth Daily., Disp: , Rfl:   Social History: " "  Social History     Tobacco Use   • Smoking status: Former     Packs/day: 2.00     Years: 40.00     Pack years: 80.00     Types: Cigarettes     Start date:      Quit date:      Years since quittin.8   • Smokeless tobacco: Never   Substance Use Topics   • Alcohol use: Never      Family History:  Family History   Problem Relation Age of Onset   • Heart disease Mother    • Heart disease Father    • Heart attack Father    • Lung cancer Brother         associated to intense cigarette smoking              Review of Systems   Constitutional: Positive for malaise/fatigue.   Cardiovascular: Negative for chest pain, leg swelling and palpitations.   Respiratory: Positive for shortness of breath.    Skin: Negative for rash.   Neurological: Positive for dizziness. Negative for light-headedness and numbness.     All other systems are negative         Objective:     Physical Exam  /60   Pulse 70   Ht 154.9 cm (61\")   Wt 76.7 kg (169 lb)   SpO2 98%   BMI 31.93 kg/m²   General:  Appears in no acute distress  Eyes: Sclera is anicteric,  conjunctiva is clear   HEENT:  No JVD.  No carotid bruits  Respiratory: Respirations regular and unlabored at rest.  Clear to auscultation  Cardiovascular: S1,S2 Regular rate and rhythm. No murmur, rub or gallop auscultated.   Extremities: No digital clubbing or cyanosis, no edema  Skin: Color pink. Skin warm and dry to touch. No rashes  No xanthoma  Neuro: Alert and awake.    Lab Reviewed:         Rodriguez Glass MD  10/31/2022 17:00 EDT      EMR Dragon/Transcription:   \"Dictated utilizing Dragon dictation\".        "

## 2022-11-16 RX ORDER — CARVEDILOL 6.25 MG/1
6.25 TABLET ORAL 2 TIMES DAILY WITH MEALS
Qty: 180 TABLET | Refills: 3 | Status: SHIPPED | OUTPATIENT
Start: 2022-11-16

## 2022-11-16 NOTE — TELEPHONE ENCOUNTER
Caller: MEGHA    Relationship: SON     Best call back number: [phone]    Requested Prescriptions:   Requested Prescriptions     Pending Prescriptions Disp Refills   • carvedilol (COREG) 6.25 MG tablet       Sig: Take 1 tablet by mouth 2 (Two) Times a Day With Meals.        Pharmacy where request should be sent:  [facility] [place] [place]     Additional details provided by patient: PATIENT'S SON REQUESTS A 90 DAY SUPPLY PLEASE      PATIENTS SON WOULD LIKE A CALL BACK TO DISCUSS MEDICINE      Does the patient have less than a 3 day supply:  [] Yes  [x] No    Sandra Louis Rep   11/16/22 14:30 EST

## 2022-11-28 ENCOUNTER — TELEPHONE (OUTPATIENT)
Dept: CARDIOLOGY | Facility: CLINIC | Age: 87
End: 2022-11-28

## 2022-11-28 DIAGNOSIS — E78.5 DYSLIPIDEMIA: Primary | ICD-10-CM

## 2022-11-28 RX ORDER — ATORVASTATIN CALCIUM 20 MG/1
20 TABLET, FILM COATED ORAL DAILY
Qty: 90 TABLET | Refills: 3 | Status: SHIPPED | OUTPATIENT
Start: 2022-11-28

## 2022-11-28 NOTE — TELEPHONE ENCOUNTER
Incoming Refill Request      Medication requested (name and dose):ATORVASTATIN 20 MG   Pharmacy where request should be sent: [facility]    Additional details provided by patient: MEGHA NOT SURE WHO PUT PATIENT ON THIS MEDICATION.     Best call back number: [phone]  Does the patient have less than a 3 day supply:  [] Yes  [x] No    Sandra Fontanez Rep  11/28/22, 08:39 EST

## 2022-11-28 NOTE — TELEPHONE ENCOUNTER
Rx Refill Note  Requested Prescriptions     Pending Prescriptions Disp Refills   • atorvastatin (LIPITOR) 20 MG tablet 90 tablet 3     Sig: Take 1 tablet by mouth Daily.      Last office visit with prescribing clinician: 10/31/2022      Next office visit with prescribing clinician: 5/1/2023            Franchesca Bowens MA  11/28/22, 09:27 EST     Recent labs 9/24/2022

## 2023-05-01 ENCOUNTER — OFFICE VISIT (OUTPATIENT)
Dept: CARDIOLOGY | Facility: CLINIC | Age: 88
End: 2023-05-01
Payer: MEDICARE

## 2023-05-01 VITALS
BODY MASS INDEX: 33.23 KG/M2 | HEIGHT: 61 IN | DIASTOLIC BLOOD PRESSURE: 73 MMHG | SYSTOLIC BLOOD PRESSURE: 169 MMHG | OXYGEN SATURATION: 94 % | WEIGHT: 176 LBS | HEART RATE: 73 BPM

## 2023-05-01 DIAGNOSIS — R55 NEAR SYNCOPE: ICD-10-CM

## 2023-05-01 DIAGNOSIS — R73.03 PREDIABETES: ICD-10-CM

## 2023-05-01 DIAGNOSIS — E78.5 DYSLIPIDEMIA: ICD-10-CM

## 2023-05-01 DIAGNOSIS — I25.10 CAD S/P PERCUTANEOUS CORONARY ANGIOPLASTY: ICD-10-CM

## 2023-05-01 DIAGNOSIS — I10 ESSENTIAL HYPERTENSION: ICD-10-CM

## 2023-05-01 DIAGNOSIS — R53.83 FATIGUE, UNSPECIFIED TYPE: ICD-10-CM

## 2023-05-01 DIAGNOSIS — I73.9 PAD (PERIPHERAL ARTERY DISEASE): ICD-10-CM

## 2023-05-01 DIAGNOSIS — R42 DIZZINESS: Primary | ICD-10-CM

## 2023-05-01 DIAGNOSIS — I25.2 HISTORY OF MYOCARDIAL INFARCTION: ICD-10-CM

## 2023-05-01 DIAGNOSIS — Z98.61 CAD S/P PERCUTANEOUS CORONARY ANGIOPLASTY: ICD-10-CM

## 2023-05-01 RX ORDER — CANDESARTAN CILEXETIL AND HYDROCHLOROTHIAZIDE 32; 12.5 MG/1; MG/1
1 TABLET ORAL DAILY
Qty: 30 TABLET | Refills: 11 | Status: SHIPPED | OUTPATIENT
Start: 2023-05-01

## 2023-05-01 NOTE — PROGRESS NOTES
Subjective:     Encounter Date:05/01/2023      Patient ID: Tricia Marroquin is a 87 y.o. female.    Chief Complaint and history of present illness:    Follow-up for CAD, PCI, hypertension, dyslipidemia     History of Present Illness        Ms. Tricia Marroquin has PMH of     CAD, stent to LCx 5/12/2016, NSTEMI 9/3/2022 and PCI to RCA, PCI 1/6/2022, cath 9/23/2022 VALENTINO to in-stent RCA stenosis  Questionable paroxysmal A. fib, no documentation found  Dyspnea due to Brilinta  Hypertension  Dyslipidemia  PAD, aortobifem 10/5/2007, right common femoral arthrectomy with patch angioplasty 12/2008  Thrombocytopenia  JELENA, back surgery, aorta surgery  Former smoker quit in 1999  Allergy to penicillin  Allergy to aspirin but tolerates baby aspirin     Here for hospital follow-up.  Patient is feeling tired had an episode of dizziness and near syncope at discharge.  Has dyspnea on exertion.  Has some bruising.  Denies any chest pain.    Patient's arterial blood pressure is 192/85, heart rate 73 bpm, O2 sat of 94% on room air.    Patient was recently sent from [facility] 9/22/2022 with questionable diagnosis of A. fib.  Review of records from [facility] revealed patient was in sinus arrhythmia and PACs but no inocencio A. fib.  Patient was having chest pain at that time therefore was admitted to the hospital      Labs from [facility] 9/16/2022 reveal troponin mildly elevated hemoglobin 10.6 platelet count 71.  proBNP normal at 65.  Glucose elevated at 105.  Patient work-up 4/12/2022 revealed normal troponin, normal proBNP at 138.  Normal CMP, PT PTT.  CBC with low white count at 2.8, hemoglobin at 11.4, platelet count of 64  Chest x-ray 4/12/2022 reveals no acute cardiopulmonary abnormality.  EKG reviewed by me from 4/12/2022 reveals sinus rhythm with sinus bradycardia at the rate of 57 bpm  Labs from 9/15/2022 reveal CBC with a hemoglobin of 10.6, normal CMP and BNP at 65.  Labs from 9/24/2022 revealed A1c of 6.  Lipid profile with cholesterol  106, triglycerides 74, HDL 48, LDL 43        Assessment:  :     Fatigue  Dizziness, near syncope  Uncontrolled hypertension  CAD, MI, PCI  PAD history of surgery  Hypertension  Dyslipidemia  Former smoker  Thrombocytopenia  Easy bruising  Questionable history of A. fib details of which are not available  Prediabetes, labs from 9/24/2022 reveal A1c of 6        Recommendations / Plan:        We will discontinue Diovan and give her Atacand hydrochlorothiazide for better blood pressure control  Check a Holter monitor to evaluate dizziness  We will follow-up closely in 2 weeks and check labs including TSH hemoglobin A1c CMP lipid profile to see why patient is complaining of fatigue.  Patient underwent cath and drug-eluting stent in-stent stenosis.  Will continue medical management with dual antiplatelet with aspirin and Plavix as tolerated at least for a year.  Will continue aspirin atorvastatin, clopidogrel, carvedilol, isosorbide, Atacand hydrochlorothiazide, Aldactone, furosemide as tolerated  Reviewed BMI over 30, counseled on weight loss diet and exercise.  Follow-up with hematology for thrombocytopenia.  We will send her to cardiac rehab, patient does not want to go.              ECG 12 Lead    Date/Time: 5/1/2023 2:13 PM  Performed by: Rodriguez Glass MD  Authorized by: Rodriguez Glass MD   Comparison: compared with previous ECG from 10/31/2022  Comparison to previous ECG: Rate isEKG done today reviewed/interpreted by me reveals sinus rhythm 3 bpm with PACs, Q waves in V1 V2, no new change compared EKG from 10/31/2022              Copied text in this portion of the note has been reviewed and is accurate as of 5/1/2023  The following portions of the patient's history were reviewed and updated as appropriate: allergies, current medications, past family history, past medical history, past social history, past surgical history and problem list.    Assessment:         MDM     Diagnosis Plan   1.  Dizziness  Holter Monitor - 72 Hour Up To 15 Days    BNP    Comprehensive Metabolic Panel    Lipid Panel    TSH    Hemoglobin A1c      2. Near syncope  Holter Monitor - 72 Hour Up To 15 Days    BNP    Comprehensive Metabolic Panel    Lipid Panel    TSH    Hemoglobin A1c      3. CAD S/P percutaneous coronary angioplasty  Holter Monitor - 72 Hour Up To 15 Days    BNP    Comprehensive Metabolic Panel    Lipid Panel    TSH    Hemoglobin A1c      4. Dyslipidemia  Holter Monitor - 72 Hour Up To 15 Days    BNP    Comprehensive Metabolic Panel    Lipid Panel    TSH    Hemoglobin A1c      5. History of myocardial infarction  Holter Monitor - 72 Hour Up To 15 Days    BNP    Comprehensive Metabolic Panel    Lipid Panel    TSH    Hemoglobin A1c      6. PAD (peripheral artery disease)  Holter Monitor - 72 Hour Up To 15 Days    BNP    Comprehensive Metabolic Panel    Lipid Panel    TSH    Hemoglobin A1c      7. Essential hypertension  Holter Monitor - 72 Hour Up To 15 Days    BNP    Comprehensive Metabolic Panel    Lipid Panel    TSH    Hemoglobin A1c      8. Prediabetes  Holter Monitor - 72 Hour Up To 15 Days    BNP    Comprehensive Metabolic Panel    Lipid Panel    TSH    Hemoglobin A1c      9. Fatigue, unspecified type  Holter Monitor - 72 Hour Up To 15 Days    BNP    Comprehensive Metabolic Panel    Lipid Panel    TSH    Hemoglobin A1c             Plan:               Past Medical History:  Past Medical History:   Diagnosis Date   • Hypertension    • Seasonal allergies      Past Surgical History:  Past Surgical History:   Procedure Laterality Date   • AORTA SURGERY     • BACK SURGERY     • CARDIAC CATHETERIZATION N/A 9/23/2022    Procedure: Left Heart Cath;  Surgeon: Rodriguez Glass MD;  Location: [facility] CATH INVASIVE LOCATION;  Service: Cardiovascular;  Laterality: N/A;   • CORONARY ANGIOPLASTY WITH STENT PLACEMENT  01/06/2022      Allergies:  Allergies   Allergen Reactions   • Aspirin Other (See Comments)     Pt  "reports \"makes my heart go crazy\"   • Codeine GI Intolerance   • Penicillins Hives   • Lisinopril Cough     Home Meds:  Current Meds:     Current Outpatient Medications:   •  aspirin 81 MG chewable tablet, Chew 1 tablet Daily., Disp: , Rfl:   •  atorvastatin (LIPITOR) 20 MG tablet, Take 1 tablet by mouth Daily., Disp: 90 tablet, Rfl: 3  •  carvedilol (COREG) 6.25 MG tablet, Take 1 tablet by mouth 2 (Two) Times a Day With Meals., Disp: 180 tablet, Rfl: 3  •  clopidogrel (PLAVIX) 75 MG tablet, Take 1 tablet by mouth Daily., Disp: , Rfl:   •  fluorometholone (FML) 0.1 % ophthalmic suspension, Administer 1 drop to both eyes 2 (Two) Times a Day., Disp: , Rfl:   •  fluticasone (FLONASE) 50 MCG/ACT nasal spray, 2 sprays into the nostril(s) as directed by provider Daily., Disp: , Rfl:   •  furosemide (LASIX) 20 MG tablet, TAKE 1 TABLET (20 MG) BY MOUTH IF NEEDED (SHORTNESS OF BREATH), Disp: , Rfl:   •  isosorbide mononitrate (IMDUR) 30 MG 24 hr tablet, Take 1 tablet by mouth Daily., Disp: , Rfl:   •  loratadine (CLARITIN) 10 MG tablet, Take 1 tablet by mouth Daily., Disp: , Rfl:   •  Multiple Vitamins-Minerals (Viteyes AREDS Formula) capsule, Take  by mouth., Disp: , Rfl:   •  nitroglycerin (NITROSTAT) 0.4 MG SL tablet, Place 1 tablet under the tongue Every 5 (Five) Minutes As Needed for Chest Pain. Take no more than 3 doses in 15 minutes., Disp: , Rfl:   •  spironolactone (ALDACTONE) 25 MG tablet, Take 1 tablet by mouth Daily., Disp: 90 tablet, Rfl: 3  •  candesartan-hydrochlorothiazide (Atacand HCT) 32-12.5 MG per tablet, Take 1 tablet by mouth Daily., Disp: 30 tablet, Rfl: 11  Social History:   Social History     Tobacco Use   • Smoking status: Former     Packs/day: 2.00     Years: 40.00     Pack years: 80.00     Types: Cigarettes     Start date:      Quit date:      Years since quittin.3   • Smokeless tobacco: Never   Substance Use Topics   • Alcohol use: Never      Family History:  Family History   Problem " "Relation Age of Onset   • Heart disease Mother    • Heart disease Father    • Heart attack Father    • Lung cancer Brother         associated to intense cigarette smoking              Review of Systems   Cardiovascular: Negative for chest pain, leg swelling and palpitations.   Respiratory: Negative for shortness of breath.    Neurological: Positive for dizziness. Negative for numbness.     All other systems are negative         Objective:     Physical Exam  /73 (BP Location: Left arm, Patient Position: Sitting, Cuff Size: Large Adult)   Pulse 73   Ht 154.9 cm (61\")   Wt 79.8 kg (176 lb)   SpO2 94%   BMI 33.25 kg/m²   General:  Appears in no acute distress  Eyes: Sclera is anicteric,  conjunctiva is clear   HEENT:  No JVD.  No carotid bruits  Respiratory: Respirations regular and unlabored at rest.  Clear to auscultation  Cardiovascular: S1,S2 Regular rate and rhythm. No murmur, rub or gallop auscultated.   Extremities: No digital clubbing or cyanosis, no edema  Skin: Color pink. Skin warm and dry to touch. No rashes  No xanthoma  Neuro: Alert and awake.    Lab Reviewed:         Rodriguez Glass MD  5/1/2023 14:20 EDT      EMR Dragon/Transcription:   \"Dictated utilizing Dragon dictation\".        "

## 2023-05-19 ENCOUNTER — TELEPHONE (OUTPATIENT)
Dept: CARDIOLOGY | Facility: CLINIC | Age: 88
End: 2023-05-19
Payer: MEDICARE

## 2023-05-19 NOTE — TELEPHONE ENCOUNTER
Pt came by office to drop off holter    LOV 5/1  Geovany was DC'd and started on Atacand     Pt said she's been having low BP since the med change   Per pt bp today 111/42      Please advise.     Has appt 5/30

## 2023-05-19 NOTE — TELEPHONE ENCOUNTER
Called informed pt  She will monitor BP   She will go to ER if she feels worse   She will call us with BP readings

## 2023-05-25 ENCOUNTER — TELEPHONE (OUTPATIENT)
Dept: CARDIOLOGY | Facility: CLINIC | Age: 88
End: 2023-05-25
Payer: MEDICARE

## 2023-05-26 NOTE — TELEPHONE ENCOUNTER
CALLED INFORMED PT, SHE IS SICK WANTS TO RESCHEDULE APPT TO SIX MONTHS IN NOV.          WERE LABS NORMAL?

## 2023-05-31 RX ORDER — CANDESARTAN CILEXETIL AND HYDROCHLOROTHIAZIDE 32; 12.5 MG/1; MG/1
TABLET ORAL
Qty: 30 TABLET | Refills: 11 | OUTPATIENT
Start: 2023-05-31

## 2023-05-31 NOTE — TELEPHONE ENCOUNTER
CALLED PT   SHE SAID BP IS BETTER   SHE IS TAKING HALF A PILL DAILY   DOES NOT NEED ANY REFILLS     DENYING THIS REFILL REQUEST

## 2023-05-31 NOTE — TELEPHONE ENCOUNTER
We just sent a years worth in     Phone note says to now cut in half   Will call pt to confirm how she is taking it and does she need refills

## 2023-06-12 ENCOUNTER — TELEPHONE (OUTPATIENT)
Dept: CARDIOLOGY | Facility: CLINIC | Age: 88
End: 2023-06-12
Payer: MEDICARE

## 2023-06-12 NOTE — TELEPHONE ENCOUNTER
Called Michael.  Advised to stop candesartan/hctz and monitor blood pressure.  I offered appointment with me from Wednesday at 3 or 3:30.  I also advised that if her blood pressure gets lower to go to the ED.  He states that she is currently being treated for bronchitis and on antibiotics.  Again encouraged him to bring her to the ED if she is not better or getting worse.

## 2023-06-14 ENCOUNTER — OFFICE VISIT (OUTPATIENT)
Dept: CARDIOLOGY | Facility: CLINIC | Age: 88
End: 2023-06-14
Payer: MEDICARE

## 2023-06-14 VITALS
HEIGHT: 61 IN | DIASTOLIC BLOOD PRESSURE: 74 MMHG | SYSTOLIC BLOOD PRESSURE: 133 MMHG | HEART RATE: 118 BPM | WEIGHT: 174 LBS | OXYGEN SATURATION: 97 % | BODY MASS INDEX: 32.85 KG/M2

## 2023-06-14 DIAGNOSIS — Z98.61 CAD S/P PERCUTANEOUS CORONARY ANGIOPLASTY: ICD-10-CM

## 2023-06-14 DIAGNOSIS — R00.0 TACHYCARDIA: ICD-10-CM

## 2023-06-14 DIAGNOSIS — I10 ESSENTIAL HYPERTENSION: Primary | Chronic | ICD-10-CM

## 2023-06-14 DIAGNOSIS — I25.10 CAD S/P PERCUTANEOUS CORONARY ANGIOPLASTY: ICD-10-CM

## 2023-06-14 DIAGNOSIS — R42 DIZZINESS: ICD-10-CM

## 2023-06-14 DIAGNOSIS — I25.10 CORONARY ARTERY DISEASE INVOLVING NATIVE CORONARY ARTERY OF NATIVE HEART WITHOUT ANGINA PECTORIS: ICD-10-CM

## 2023-06-14 PROBLEM — I48.91 ATRIAL FIBRILLATION: Status: RESOLVED | Noted: 2022-09-23 | Resolved: 2023-06-14

## 2023-06-14 PROCEDURE — 3078F DIAST BP <80 MM HG: CPT | Performed by: NURSE PRACTITIONER

## 2023-06-14 PROCEDURE — 3075F SYST BP GE 130 - 139MM HG: CPT | Performed by: NURSE PRACTITIONER

## 2023-06-14 PROCEDURE — 99214 OFFICE O/P EST MOD 30 MIN: CPT | Performed by: NURSE PRACTITIONER

## 2023-06-14 PROCEDURE — 1160F RVW MEDS BY RX/DR IN RCRD: CPT | Performed by: NURSE PRACTITIONER

## 2023-06-14 PROCEDURE — 1159F MED LIST DOCD IN RCRD: CPT | Performed by: NURSE PRACTITIONER

## 2023-06-14 PROCEDURE — 93000 ELECTROCARDIOGRAM COMPLETE: CPT | Performed by: NURSE PRACTITIONER

## 2023-06-14 RX ORDER — CARVEDILOL 12.5 MG/1
12.5 TABLET ORAL 2 TIMES DAILY WITH MEALS
Qty: 60 TABLET | Refills: 3 | Status: SHIPPED | OUTPATIENT
Start: 2023-06-14

## 2023-06-14 RX ORDER — CARVEDILOL 12.5 MG/1
12.5 TABLET ORAL 2 TIMES DAILY WITH MEALS
Qty: 60 TABLET | Refills: 3 | Status: SHIPPED | OUTPATIENT
Start: 2023-06-14 | End: 2023-06-14 | Stop reason: SDUPTHER

## 2023-06-14 NOTE — PROGRESS NOTES
Subjective:     Encounter Date:06/14/2023      Patient ID: Tricia Marroquin is a 87 y.o. female.    Chief Complaint: acute visit for low blood pressure and dizziness     History of Present Illness    Ms. Tricia Marroquin has PMH of     CAD, stent to LCx 5/12/2016, NSTEMI 9/3/2022 and PCI to RCA, PCI 1/6/2022, cath 9/23/2022 VALENTINO to in-stent RCA stenosis  Questionable paroxysmal A. fib, no documentation found  Dyspnea due to Brilinta  Hypertension  Dyslipidemia  PAD, aortobifem 10/5/2007, right common femoral arthrectomy with patch angioplasty 12/2008  Thrombocytopenia  JELENA, back surgery, aorta surgery  Former smoker quit in 1999  Allergy to penicillin  Allergy to aspirin but tolerates baby aspirin    Here today for an acute visit for dizziness and low blood pressure.   Patient was seen by Dr. Glass last month and her blood pressure was in the 190s therefore her valsartan was changes to candesartan/hctz.  She had been doing okay however developed worsening dizziness, then developed bronchitis shortly after in which she was on antibiotics.  Patient's son had called on 5/19/2023 with low blood pressure 111/42 and I advised she cut her new medication in half.  Patient's son called in on 6/12/2023 reporting blood pressure was 100/40 and patient was dizzy and not feeling well- at that time I advised to stop the candesartan/hctz and make an appointment to be seen this week.  They originally declined appointment however called back and wanted to be seen.  Patient continues to have dizziness fatigue weakness and shortness of breath.  She reports her shortness of breath has improved over the last few weeks however continues to feel weak.    Today patient's blood pressure is 133/74 heart rate 118    Orthostatic vital signs today 146/57 laying.  135/66 standing     Patient had recent Holter monitor that was unremarkable for arrhythmias however did show Rare ventricular ectopic beats, total of 21 beats, mostly singles.  1  "triplet and one couplet seen.    Lab Review:     Labs from 5/3/2023 globin A1c 6.3 CMP unremarkable TSH 1.75    The following portions of the patient's history were reviewed and updated as appropriate: allergies, current medications, past family history, past medical history, past social history, past surgical history and problem list.    Review of Systems   Constitutional: Positive for malaise/fatigue.   Cardiovascular: Positive for dyspnea on exertion. Negative for chest pain, leg swelling, near-syncope, palpitations and syncope.   Respiratory: Positive for cough (Improving) and shortness of breath (Improving-noted recent bronchitis).    Gastrointestinal: Negative for abdominal pain, nausea and vomiting.   Neurological: Positive for dizziness, light-headedness and weakness.   All other systems reviewed and are negative.    Past Medical History:   Diagnosis Date   • Hypertension    • Seasonal allergies      Past Surgical History:   Procedure Laterality Date   • AORTA SURGERY     • BACK SURGERY     • CARDIAC CATHETERIZATION N/A 9/23/2022    Procedure: Left Heart Cath;  Surgeon: Rodriguez Glass MD;  Location: [facility] CATH INVASIVE LOCATION;  Service: Cardiovascular;  Laterality: N/A;   • CORONARY ANGIOPLASTY WITH STENT PLACEMENT  01/06/2022     /74   Pulse 118   Ht 154.9 cm (61\")   Wt 78.9 kg (174 lb)   SpO2 97%   BMI 32.88 kg/m²   Family History   Problem Relation Age of Onset   • Heart disease Mother    • Heart disease Father    • Heart attack Father    • Lung cancer Brother         associated to intense cigarette smoking       Current Outpatient Medications:   •  aspirin 81 MG chewable tablet, Chew 1 tablet Daily., Disp: , Rfl:   •  atorvastatin (LIPITOR) 20 MG tablet, Take 1 tablet by mouth Daily., Disp: 90 tablet, Rfl: 3  •  candesartan-hydrochlorothiazide (Atacand HCT) 32-12.5 MG per tablet, Take 1 tablet by mouth Daily., Disp: 30 tablet, Rfl: 11  •  clopidogrel (PLAVIX) 75 MG tablet, Take 1 " "tablet by mouth Daily., Disp: , Rfl:   •  fluorometholone (FML) 0.1 % ophthalmic suspension, Administer 1 drop to both eyes 2 (Two) Times a Day., Disp: , Rfl:   •  fluticasone (FLONASE) 50 MCG/ACT nasal spray, 2 sprays into the nostril(s) as directed by provider Daily., Disp: , Rfl:   •  furosemide (LASIX) 20 MG tablet, TAKE 1 TABLET (20 MG) BY MOUTH IF NEEDED (SHORTNESS OF BREATH), Disp: , Rfl:   •  isosorbide mononitrate (IMDUR) 30 MG 24 hr tablet, Take 1 tablet by mouth Daily., Disp: , Rfl:   •  loratadine (CLARITIN) 10 MG tablet, Take 1 tablet by mouth Daily., Disp: , Rfl:   •  Multiple Vitamins-Minerals (Viteyes AREDS Formula) capsule, Take  by mouth., Disp: , Rfl:   •  nitroglycerin (NITROSTAT) 0.4 MG SL tablet, Place 1 tablet under the tongue Every 5 (Five) Minutes As Needed for Chest Pain. Take no more than 3 doses in 15 minutes., Disp: , Rfl:   •  spironolactone (ALDACTONE) 25 MG tablet, Take 1 tablet by mouth Daily., Disp: 90 tablet, Rfl: 3  •  carvedilol (COREG) 12.5 MG tablet, Take 1 tablet by mouth 2 (Two) Times a Day With Meals., Disp: 60 tablet, Rfl: 3  Allergies   Allergen Reactions   • Aspirin Other (See Comments)     Pt reports \"makes my heart go crazy\"   • Codeine GI Intolerance   • Penicillins Hives   • Lisinopril Cough     Social History     Socioeconomic History   • Marital status:    Tobacco Use   • Smoking status: Former     Packs/day: 2.00     Years: 40.00     Pack years: 80.00     Types: Cigarettes     Start date:      Quit date:      Years since quittin.4   • Smokeless tobacco: Never   Vaping Use   • Vaping Use: Never used   Substance and Sexual Activity   • Alcohol use: Never   • Drug use: Never   • Sexual activity: Defer                Objective:     Vitals reviewed.   Constitutional:       Appearance: Not in distress. Frail.   Neck:      Vascular: No JVR. JVD normal.   Pulmonary:      Effort: Pulmonary effort is normal.      Breath sounds: Normal breath sounds. No " wheezing. No rhonchi. No rales.   Chest:      Chest wall: Not tender to palpatation.   Cardiovascular:      PMI at left midclavicular line. Tachycardia present. Regular rhythm. Normal S1. Normal S2.      Murmurs: There is no murmur.      No gallop. No click. No rub.   Pulses:     Intact distal pulses.   Edema:     Peripheral edema absent.   Abdominal:      General: Bowel sounds are normal.      Palpations: Abdomen is soft.      Tenderness: There is no abdominal tenderness.   Musculoskeletal: Normal range of motion.         General: No tenderness. Skin:     General: Skin is warm and dry.   Neurological:      General: No focal deficit present.      Mental Status: Alert and oriented to person, place and time.         ECG 12 Lead    Date/Time: 6/14/2023 3:32 PM  Performed by: Tessie Dutta APRN  Authorized by: Tessie Dutta APRN   Comparison: compared with previous ECG from 5/1/2023  Similar to previous ECG  Comparison to previous ECG: Previously sinus rhythm with PACs  Rhythm: sinus rhythm  Rate: normal  BPM: 94    Clinical impression: normal ECG                          Assessment:     MDM     Diagnosis Plan   1. Essential hypertension  ECG 12 Lead      2. Coronary artery disease involving native coronary artery of native heart without angina pectoris  ECG 12 Lead      3. Dizziness        4. Tachycardia  ECG 12 Lead      5. CAD S/P percutaneous coronary angioplasty                       Plan:     Chart reviewed  Labs reviewed  Reviewed medications with patient  EKG performed and interpreted by myself which read normal sinus rhythm with a rate of 94  When patient first arrived to the room her heart rate was 118    I have advised patient to increase her carvedilol to12.5mg bid   Advised her not to take her candesartan hydrochlorothiazide still for her previous dose of valsartan    ContinueSpironolactone 25 mg daily isosorbide and Lasix  Continue aspirin statin Plavix for CAD    Patient denies any chest pain  I did  discuss with her possibility of this dizziness being ischemic equivalent and suggested a stress test  Patient wants to wait and see how medication changes work prior to ordering stress test    We will follow-up in 4 to 6 weeks  Advised patient to continue monitoring her blood pressure closely at home      Electronically signed by KAISER Simmons, 06/14/23, 3:58 PM EDT.          This document is intended for medical expert use only.  Reading of this document by patients and/or patient's family without participating medical staff guidance may result in misinterpretation and unintended morbidity. Any interpretation of such data is the responsibility of the patient and/or family member responsible for the patient in concert with their primary or specialist providers, not to be left for sources of online search as such as [employer], [employer] or similar queries.  Relying on these approaches to knowledge may result in misinterpretation, misguided goals of care and even death should patient or family members try recommendations outside of the realm of professional medical care in a supervised inpatient environment.

## 2023-06-14 NOTE — TELEPHONE ENCOUNTER
Rx Refill Note  Requested Prescriptions     Pending Prescriptions Disp Refills    carvedilol (COREG) 12.5 MG tablet 60 tablet 3     Sig: Take 1 tablet by mouth 2 (Two) Times a Day With Meals.      Last office visit with prescribing clinician: 6/14/2023   Last telemedicine visit with prescribing clinician: Visit date not found   Next office visit with prescribing clinician: 8/4/2023                         Would you like a call back once the refill request has been completed: [] Yes [] No    If the office needs to give you a call back, can they leave a voicemail: [] Yes [] No    Zohreh Go MA  06/14/23, 15:54 EDT

## 2023-06-14 NOTE — PATIENT INSTRUCTIONS
Do not take valsartan or candesartan/hydrochlorothiazide.     Increase coreg (carvedilol) to 12.5mg twice daily     Keep track of blood pressure

## 2023-08-03 ENCOUNTER — TELEPHONE (OUTPATIENT)
Dept: CARDIOLOGY | Facility: CLINIC | Age: 88
End: 2023-08-03

## 2023-08-03 NOTE — TELEPHONE ENCOUNTER
[facility] received a fax that requires your attention. The document has been indexed to the patient’s chart for your review.      Reason for sending: EXTERNAL MEDICAL RECORD NOTIFICATION     Documents Description: LAB RESULTS     Name of Sender: [facility]     Date Indexed: 8.3.23

## 2023-09-07 RX ORDER — SPIRONOLACTONE 25 MG/1
TABLET ORAL
Qty: 90 TABLET | Refills: 2 | Status: SHIPPED | OUTPATIENT
Start: 2023-09-07

## 2023-09-07 NOTE — TELEPHONE ENCOUNTER
Rx Refill Note  Requested Prescriptions     Pending Prescriptions Disp Refills    spironolactone (ALDACTONE) 25 MG tablet [Pharmacy Med Name: SPIRONOLACTONE 25 MG TABLET] 90 tablet 3     Sig: TAKE 1 TABLET BY MOUTH EVERY DAY      Last office visit with prescribing clinician: 5/1/2023   Last telemedicine visit with prescribing clinician: Visit date not found   Next office visit with prescribing clinician: 11/16/2023                         Would you like a call back once the refill request has been completed: [] Yes [] No    If the office needs to give you a call back, can they leave a voicemail: [] Yes [] No    Cece Eric MA  09/07/23, 09:12 EDT

## 2023-09-28 ENCOUNTER — TELEPHONE (OUTPATIENT)
Dept: CARDIOLOGY | Facility: CLINIC | Age: 88
End: 2023-09-28
Payer: MEDICARE

## 2023-10-02 ENCOUNTER — TELEPHONE (OUTPATIENT)
Dept: CARDIOLOGY | Facility: CLINIC | Age: 88
End: 2023-10-02
Payer: MEDICARE

## 2023-10-02 ENCOUNTER — NURSE TRIAGE (OUTPATIENT)
Dept: CALL CENTER | Facility: HOSPITAL | Age: 88
End: 2023-10-02
Payer: MEDICARE

## 2023-10-02 DIAGNOSIS — R06.09 DYSPNEA ON EXERTION: Primary | ICD-10-CM

## 2023-10-02 DIAGNOSIS — I10 ESSENTIAL (PRIMARY) HYPERTENSION: ICD-10-CM

## 2023-10-02 NOTE — TELEPHONE ENCOUNTER
"Reason for Disposition  • [1] MILD difficulty breathing (e.g., minimal/no SOB at rest, SOB with walking, pulse <100) AND [2] NEW-onset or WORSE than normal    Additional Information  • Negative: SEVERE difficulty breathing (e.g., struggling for each breath, speaks in single words)  • Negative: [1] Breathing stopped AND [2] hasn't returned  • Negative: Choking on something  • Negative: Bluish (or gray) lips or face now  • Negative: Difficult to awaken or acting confused (e.g., disoriented, slurred speech)  • Negative: Passed out (i.e., lost consciousness, collapsed and was not responding)  • Negative: Wheezing started suddenly after medicine, an allergic food or bee sting  • Negative: Stridor (harsh sound while breathing in)  • Negative: Slow, shallow and weak breathing  • Negative: Sounds like a life-threatening emergency to the triager  • Negative: Chest pain  • Negative: [1] Wheezing (high pitched whistling sound) AND [2] previous asthma attacks or use of asthma medicines  • Negative: [1] Difficulty breathing AND [2] only present when coughing  • Negative: [1] Difficulty breathing AND [2] only from stuffy or runny nose  • Negative: [1] Difficulty breathing AND [2] within 14 days of COVID-19 Exposure  • Negative: [1] MODERATE difficulty breathing (e.g., speaks in phrases, SOB even at rest, pulse 100-120) AND [2] NEW-onset or WORSE than normal  • Negative: Oxygen level (e.g., pulse oximetry) 90 percent or lower  • Negative: Wheezing can be heard across the room  • Negative: Drooling or spitting out saliva (because can't swallow)  • Negative: History of prior \"blood clot\" in leg or lungs (i.e., deep vein thrombosis, pulmonary embolism)  • Negative: History of inherited increased risk of blood clots (e.g., Factor 5 Leiden, Anti-thrombin 3, Protein C or Protein S deficiency, Prothrombin mutation)  • Negative: Major surgery in the past month  • Negative: Hip or leg fracture (broken bone) in past month (or had cast on " "leg or ankle in past month)  • Negative: Illness requiring prolonged bedrest in past month (e.g., immobilization, long hospital stay)  • Negative: Long-distance travel in past month (e.g., car, bus, train, plane; with trip lasting 6 or more hours)  • Negative: Cancer treatment in past six months (or has cancer now)  • Negative: Extra heartbeats, irregular heart beating, or heart is beating very fast  (i.e., \"palpitations\")  • Negative: Fever > 103 F (39.4 C)  • Negative: [1] Fever > 101 F (38.3 C) AND [2] age > 60 years  • Negative: [1] Fever > 100.0 F (37.8 C) AND [2] bedridden (e.g., CVA, chronic illness, recovering from surgery)  • Negative: [1] Fever > 100.0 F (37.8 C) AND [2] diabetes mellitus or weak immune system (e.g., HIV positive, cancer chemo, splenectomy, organ transplant, chronic steroids)  • Negative: [1] Periods where breathing stops and then resumes normally AND [2] bedridden (e.g., CVA)  • Negative: Pregnant or postpartum (from 0 to 6 weeks after delivery)  • Negative: Patient sounds very sick or weak to the triager  • Negative: [1] Longstanding difficulty breathing (e.g., CHF, COPD, emphysema) AND [2] WORSE than normal  • Negative: [1] Longstanding difficulty breathing AND [2] not responding to usual therapy  • Negative: Continuous (nonstop) coughing interferes with work, school, or sleeping  • Negative: Oxygen level (e.g., pulse oximetry) 91 to 94 percent    Answer Assessment - Initial Assessment Questions  1. RESPIRATORY STATUS: \"Describe your breathing?\" (e.g., wheezing, shortness of breath, unable to speak, severe coughing)       SOA with minimal exertion.  2. ONSET: \"When did this breathing problem begin?\"       Worse over the last several days.  3. PATTERN \"Does the difficult breathing come and go, or has it been constant since it started?\"       Constant with activity.  4. SEVERITY: \"How bad is your breathing?\" (e.g., mild, moderate, severe)     - MILD: No SOB at rest, mild SOB with walking, " "speaks normally in sentences, can lie down, no retractions, pulse < 100.     - MODERATE: SOB at rest, SOB with minimal exertion and prefers to sit, cannot lie down flat, speaks in phrases, mild retractions, audible wheezing, pulse 100-120.     - SEVERE: Very SOB at rest, speaks in single words, struggling to breathe, sitting hunched forward, retractions, pulse > 120       mod  5. RECURRENT SYMPTOM: \"Have you had difficulty breathing before?\" If Yes, ask: \"When was the last time?\" and \"What happened that time?\"       no  6. CARDIAC HISTORY: \"Do you have any history of heart disease?\" (e.g., heart attack, angina, bypass surgery, angioplasty)       yes  7. LUNG HISTORY: \"Do you have any history of lung disease?\"  (e.g., pulmonary embolus, asthma, emphysema)      no  8. CAUSE: \"What do you think is causing the breathing problem?\"       Not sure  9. OTHER SYMPTOMS: \"Do you have any other symptoms? (e.g., dizziness, runny nose, cough, chest pain, fever)      Has had no energy for several weeks.  10. O2 SATURATION MONITOR:  \"Do you use an oxygen saturation monitor (pulse oximeter) at home?\" If Yes, ask: \"What is your reading (oxygen level) today?\" \"What is your usual oxygen saturation reading?\" (e.g., 95%)        no  11. PREGNANCY: \"Is there any chance you are pregnant?\" \"When was your last menstrual period?\"        na  12. TRAVEL: \"Have you traveled out of the country in the last month?\" (e.g., travel history, exposures)        no    Protocols used: Breathing Difficulty-ADULT-AH    "

## 2023-10-02 NOTE — TELEPHONE ENCOUNTER
Call transferred from [facility], unable to reach the office. Caller states that his mother has been C/O no energy for a few weeks and for the last several days she has had increasing SOA. She will have to rest after taking 4 or 5 steps. No edema. No chest pain. Warm transfer to the office.

## 2023-10-03 ENCOUNTER — INPATIENT HOSPITAL (OUTPATIENT)
Dept: URBAN - METROPOLITAN AREA HOSPITAL 84 | Facility: HOSPITAL | Age: 88
End: 2023-10-03

## 2023-10-03 ENCOUNTER — APPOINTMENT (OUTPATIENT)
Dept: GENERAL RADIOLOGY | Facility: HOSPITAL | Age: 88
DRG: 291 | End: 2023-10-03
Payer: MEDICARE

## 2023-10-03 ENCOUNTER — HOSPITAL ENCOUNTER (INPATIENT)
Facility: HOSPITAL | Age: 88
LOS: 5 days | Discharge: HOME | DRG: 291 | End: 2023-10-09
Attending: EMERGENCY MEDICINE | Admitting: INTERNAL MEDICINE
Payer: MEDICARE

## 2023-10-03 DIAGNOSIS — D50.9 IRON DEFICIENCY ANEMIA, UNSPECIFIED: ICD-10-CM

## 2023-10-03 DIAGNOSIS — G62.9 PERIPHERAL POLYNEUROPATHY: ICD-10-CM

## 2023-10-03 DIAGNOSIS — I48.91 ATRIAL FIBRILLATION WITH RVR: ICD-10-CM

## 2023-10-03 DIAGNOSIS — D53.9 NUTRITIONAL ANEMIA, UNSPECIFIED: ICD-10-CM

## 2023-10-03 DIAGNOSIS — D72.829 ELEVATED WHITE BLOOD CELL COUNT, UNSPECIFIED: ICD-10-CM

## 2023-10-03 DIAGNOSIS — R06.00 DYSPNEA, UNSPECIFIED TYPE: Primary | ICD-10-CM

## 2023-10-03 DIAGNOSIS — Z79.02 LONG TERM (CURRENT) USE OF ANTITHROMBOTICS/ANTIPLATELETS: ICD-10-CM

## 2023-10-03 DIAGNOSIS — D69.6 THROMBOCYTOPENIA, UNSPECIFIED: ICD-10-CM

## 2023-10-03 DIAGNOSIS — D72.829 LEUKOCYTOSIS, UNSPECIFIED TYPE: ICD-10-CM

## 2023-10-03 PROBLEM — I25.2 HISTORY OF MI (MYOCARDIAL INFARCTION): Status: ACTIVE | Noted: 2023-10-03

## 2023-10-03 PROBLEM — R42 DIZZINESS: Status: ACTIVE | Noted: 2023-10-03

## 2023-10-03 PROBLEM — D64.9 SYMPTOMATIC ANEMIA: Status: ACTIVE | Noted: 2023-10-03

## 2023-10-03 LAB
ABO GROUP BLD: NORMAL
ALBUMIN SERPL-MCNC: 3.8 G/DL (ref 3.5–5.2)
ALBUMIN SERPL-MCNC: 4 G/DL (ref 3.5–5.2)
ALBUMIN/GLOB SERPL: 1.6 G/DL
ALBUMIN/GLOB SERPL: 1.7 G/DL
ALP SERPL-CCNC: 64 U/L (ref 39–117)
ALP SERPL-CCNC: 66 U/L (ref 39–117)
ALT SERPL W P-5'-P-CCNC: 13 U/L (ref 1–33)
ALT SERPL W P-5'-P-CCNC: 14 U/L (ref 1–33)
ANION GAP SERPL CALCULATED.3IONS-SCNC: 10 MMOL/L (ref 5–15)
ANION GAP SERPL CALCULATED.3IONS-SCNC: 11 MMOL/L (ref 5–15)
ANISOCYTOSIS BLD QL: ABNORMAL
ANISOCYTOSIS BLD QL: ABNORMAL
APTT PPP: 26.7 SECONDS (ref 61–76.5)
AST SERPL-CCNC: 20 U/L (ref 1–32)
AST SERPL-CCNC: 25 U/L (ref 1–32)
BILIRUB SERPL-MCNC: 0.3 MG/DL (ref 0–1.2)
BILIRUB SERPL-MCNC: 0.3 MG/DL (ref 0–1.2)
BILIRUB UR QL STRIP: NEGATIVE
BLASTS NFR BLD MANUAL: 22 % (ref 0–0)
BLASTS NFR BLD MANUAL: 48 % (ref 0–0)
BLD GP AB SCN SERPL QL: NEGATIVE
BUN SERPL-MCNC: 37 MG/DL (ref 8–23)
BUN SERPL-MCNC: 38 MG/DL (ref 8–23)
BUN/CREAT SERPL: 19.2 (ref 7–25)
BUN/CREAT SERPL: 22.4 (ref 7–25)
CALCIUM SPEC-SCNC: 8.8 MG/DL (ref 8.6–10.5)
CALCIUM SPEC-SCNC: 9 MG/DL (ref 8.6–10.5)
CHLORIDE SERPL-SCNC: 105 MMOL/L (ref 98–107)
CHLORIDE SERPL-SCNC: 108 MMOL/L (ref 98–107)
CLARITY UR: CLEAR
CO2 SERPL-SCNC: 23 MMOL/L (ref 22–29)
CO2 SERPL-SCNC: 24 MMOL/L (ref 22–29)
COLOR UR: YELLOW
CREAT SERPL-MCNC: 1.65 MG/DL (ref 0.57–1)
CREAT SERPL-MCNC: 1.98 MG/DL (ref 0.57–1)
D-LACTATE SERPL-SCNC: 0.6 MMOL/L (ref 0.3–2)
DACRYOCYTES BLD QL SMEAR: ABNORMAL
DACRYOCYTES BLD QL SMEAR: ABNORMAL
DEPRECATED RDW RBC AUTO: 80.9 FL (ref 37–54)
DEPRECATED RDW RBC AUTO: 84 FL (ref 37–54)
EGFRCR SERPLBLD CKD-EPI 2021: 24.2 ML/MIN/1.73
EGFRCR SERPLBLD CKD-EPI 2021: 30.1 ML/MIN/1.73
ERYTHROCYTE [DISTWIDTH] IN BLOOD BY AUTOMATED COUNT: 21.6 % (ref 12.3–15.4)
ERYTHROCYTE [DISTWIDTH] IN BLOOD BY AUTOMATED COUNT: 21.7 % (ref 12.3–15.4)
FIBRINOGEN PPP-MCNC: 329 MG/DL (ref 210–450)
FOLATE SERPL-MCNC: 5.27 NG/ML (ref 4.78–24.2)
GEN 5 2HR TROPONIN T REFLEX: 17 NG/L
GLOBULIN UR ELPH-MCNC: 2.3 GM/DL
GLOBULIN UR ELPH-MCNC: 2.4 GM/DL
GLUCOSE SERPL-MCNC: 110 MG/DL (ref 65–99)
GLUCOSE SERPL-MCNC: 142 MG/DL (ref 65–99)
GLUCOSE UR STRIP-MCNC: NEGATIVE MG/DL
HBA1C MFR BLD: 6.4 % (ref 4.8–5.6)
HCT VFR BLD AUTO: 23 % (ref 34–46.6)
HCT VFR BLD AUTO: 24.8 % (ref 34–46.6)
HGB BLD-MCNC: 7.3 G/DL (ref 12–15.9)
HGB BLD-MCNC: 7.3 G/DL (ref 12–15.9)
HGB BLD-MCNC: 7.8 G/DL (ref 12–15.9)
HGB UR QL STRIP.AUTO: NEGATIVE
INR PPP: 1.01 (ref 0.93–1.1)
IRON 24H UR-MRATE: 123 MCG/DL (ref 37–145)
IRON SATN MFR SERPL: 35 % (ref 20–50)
KETONES UR QL STRIP: NEGATIVE
LAB AP CASE REPORT: NORMAL
LARGE PLATELETS: ABNORMAL
LDH SERPL-CCNC: 761 U/L (ref 135–214)
LEUKOCYTE ESTERASE UR QL STRIP.AUTO: NEGATIVE
LYMPHOCYTES # BLD MANUAL: 10.47 10*3/MM3 (ref 0.7–3.1)
LYMPHOCYTES # BLD MANUAL: 6.12 10*3/MM3 (ref 0.7–3.1)
LYMPHOCYTES NFR BLD MANUAL: 21 % (ref 5–12)
LYMPHOCYTES NFR BLD MANUAL: 23 % (ref 5–12)
MACROCYTES BLD QL SMEAR: ABNORMAL
MACROCYTES BLD QL SMEAR: ABNORMAL
MAGNESIUM SERPL-MCNC: 2.4 MG/DL (ref 1.6–2.4)
MAGNESIUM SERPL-MCNC: 2.8 MG/DL (ref 1.6–2.4)
MCH RBC QN AUTO: 33.3 PG (ref 26.6–33)
MCH RBC QN AUTO: 33.5 PG (ref 26.6–33)
MCHC RBC AUTO-ENTMCNC: 31.3 G/DL (ref 31.5–35.7)
MCHC RBC AUTO-ENTMCNC: 31.8 G/DL (ref 31.5–35.7)
MCV RBC AUTO: 105.3 FL (ref 79–97)
MCV RBC AUTO: 106.3 FL (ref 79–97)
METAMYELOCYTES NFR BLD MANUAL: 4 % (ref 0–0)
METAMYELOCYTES NFR BLD MANUAL: 6 % (ref 0–0)
MONOCYTES # BLD: 14.66 10*3/MM3 (ref 0.1–0.9)
MONOCYTES # BLD: 15.64 10*3/MM3 (ref 0.1–0.9)
MYELOCYTES NFR BLD MANUAL: 6 % (ref 0–0)
NEUTROPHILS # BLD AUTO: 12.92 10*3/MM3 (ref 1.7–7)
NEUTROPHILS # BLD AUTO: 8.38 10*3/MM3 (ref 1.7–7)
NEUTROPHILS NFR BLD MANUAL: 12 % (ref 42.7–76)
NEUTROPHILS NFR BLD MANUAL: 6 % (ref 42.7–76)
NEUTS BAND NFR BLD MANUAL: 13 % (ref 0–5)
NITRITE UR QL STRIP: NEGATIVE
NRBC SPEC MANUAL: 1 /100 WBC (ref 0–0.2)
NT-PROBNP SERPL-MCNC: 2457 PG/ML (ref 0–1800)
OVALOCYTES BLD QL SMEAR: ABNORMAL
PATH REPORT.FINAL DX SPEC: NORMAL
PATHOLOGY REVIEW: YES
PH UR STRIP.AUTO: 7.5 [PH] (ref 5–8)
PHOSPHATE SERPL-MCNC: 4.1 MG/DL (ref 2.5–4.5)
PLATELET # BLD AUTO: 58 10*3/MM3 (ref 140–450)
PLATELET # BLD AUTO: 62 10*3/MM3 (ref 140–450)
PMV BLD AUTO: 10.6 FL (ref 6–12)
PMV BLD AUTO: 9.9 FL (ref 6–12)
POIKILOCYTOSIS BLD QL SMEAR: ABNORMAL
POIKILOCYTOSIS BLD QL SMEAR: ABNORMAL
POLYCHROMASIA BLD QL SMEAR: ABNORMAL
POLYCHROMASIA BLD QL SMEAR: ABNORMAL
POTASSIUM SERPL-SCNC: 4.4 MMOL/L (ref 3.5–5.2)
POTASSIUM SERPL-SCNC: 4.5 MMOL/L (ref 3.5–5.2)
PROMYELOCYTES NFR BLD MANUAL: 15 % (ref 0–0)
PROT SERPL-MCNC: 6.2 G/DL (ref 6–8.5)
PROT SERPL-MCNC: 6.3 G/DL (ref 6–8.5)
PROT UR QL STRIP: NEGATIVE
PROTHROMBIN TIME: 11 SECONDS (ref 9.6–11.7)
RBC # BLD AUTO: 2.19 10*6/MM3 (ref 3.77–5.28)
RBC # BLD AUTO: 2.33 10*6/MM3 (ref 3.77–5.28)
RH BLD: POSITIVE
SCAN SLIDE: NORMAL
SCAN SLIDE: NORMAL
SMALL PLATELETS BLD QL SMEAR: ABNORMAL
SODIUM SERPL-SCNC: 139 MMOL/L (ref 136–145)
SODIUM SERPL-SCNC: 142 MMOL/L (ref 136–145)
SP GR UR STRIP: 1.01 (ref 1–1.03)
T&S EXPIRATION DATE: NORMAL
TIBC SERPL-MCNC: 350 MCG/DL (ref 298–536)
TRANSFERRIN SERPL-MCNC: 235 MG/DL (ref 200–360)
TROPONIN T DELTA: -1 NG/L
TROPONIN T SERPL HS-MCNC: 18 NG/L
TSH SERPL DL<=0.05 MIU/L-ACNC: 6.01 UIU/ML (ref 0.27–4.2)
URATE SERPL-MCNC: 15 MG/DL (ref 2.4–5.7)
UROBILINOGEN UR QL STRIP: NORMAL
VARIANT LYMPHS NFR BLD MANUAL: 15 % (ref 19.6–45.3)
VARIANT LYMPHS NFR BLD MANUAL: 3 % (ref 0–5)
VARIANT LYMPHS NFR BLD MANUAL: 6 % (ref 19.6–45.3)
VIT B12 BLD-MCNC: 1521 PG/ML (ref 211–946)
WBC MORPH BLD: NORMAL
WBC MORPH BLD: NORMAL
WBC NRBC COR # BLD: 68 10*3/MM3 (ref 3.4–10.8)
WBC NRBC COR # BLD: 69.8 10*3/MM3 (ref 3.4–10.8)

## 2023-10-03 PROCEDURE — 87040 BLOOD CULTURE FOR BACTERIA: CPT | Performed by: INTERNAL MEDICINE

## 2023-10-03 PROCEDURE — 85007 BL SMEAR W/DIFF WBC COUNT: CPT | Performed by: EMERGENCY MEDICINE

## 2023-10-03 PROCEDURE — 85018 HEMOGLOBIN: CPT

## 2023-10-03 PROCEDURE — 83605 ASSAY OF LACTIC ACID: CPT

## 2023-10-03 PROCEDURE — 85730 THROMBOPLASTIN TIME PARTIAL: CPT | Performed by: EMERGENCY MEDICINE

## 2023-10-03 PROCEDURE — 93005 ELECTROCARDIOGRAM TRACING: CPT | Performed by: NURSE PRACTITIONER

## 2023-10-03 PROCEDURE — 85025 COMPLETE CBC W/AUTO DIFF WBC: CPT | Performed by: INTERNAL MEDICINE

## 2023-10-03 PROCEDURE — 99222 1ST HOSP IP/OBS MODERATE 55: CPT

## 2023-10-03 PROCEDURE — 93005 ELECTROCARDIOGRAM TRACING: CPT

## 2023-10-03 PROCEDURE — 84550 ASSAY OF BLOOD/URIC ACID: CPT | Performed by: INTERNAL MEDICINE

## 2023-10-03 PROCEDURE — 86901 BLOOD TYPING SEROLOGIC RH(D): CPT

## 2023-10-03 PROCEDURE — 85007 BL SMEAR W/DIFF WBC COUNT: CPT | Performed by: INTERNAL MEDICINE

## 2023-10-03 PROCEDURE — 99214 OFFICE O/P EST MOD 30 MIN: CPT | Performed by: INTERNAL MEDICINE

## 2023-10-03 PROCEDURE — 80053 COMPREHEN METABOLIC PANEL: CPT | Performed by: EMERGENCY MEDICINE

## 2023-10-03 PROCEDURE — 83880 ASSAY OF NATRIURETIC PEPTIDE: CPT | Performed by: EMERGENCY MEDICINE

## 2023-10-03 PROCEDURE — 85384 FIBRINOGEN ACTIVITY: CPT | Performed by: INTERNAL MEDICINE

## 2023-10-03 PROCEDURE — 93005 ELECTROCARDIOGRAM TRACING: CPT | Performed by: EMERGENCY MEDICINE

## 2023-10-03 PROCEDURE — 85025 COMPLETE CBC W/AUTO DIFF WBC: CPT | Performed by: EMERGENCY MEDICINE

## 2023-10-03 PROCEDURE — G0378 HOSPITAL OBSERVATION PER HR: HCPCS

## 2023-10-03 PROCEDURE — 82607 VITAMIN B-12: CPT

## 2023-10-03 PROCEDURE — 86901 BLOOD TYPING SEROLOGIC RH(D): CPT | Performed by: EMERGENCY MEDICINE

## 2023-10-03 PROCEDURE — 25010000002 FUROSEMIDE PER 20 MG: Performed by: NURSE PRACTITIONER

## 2023-10-03 PROCEDURE — 84443 ASSAY THYROID STIM HORMONE: CPT | Performed by: EMERGENCY MEDICINE

## 2023-10-03 PROCEDURE — 80053 COMPREHEN METABOLIC PANEL: CPT | Performed by: INTERNAL MEDICINE

## 2023-10-03 PROCEDURE — 86850 RBC ANTIBODY SCREEN: CPT | Performed by: EMERGENCY MEDICINE

## 2023-10-03 PROCEDURE — 81003 URINALYSIS AUTO W/O SCOPE: CPT | Performed by: INTERNAL MEDICINE

## 2023-10-03 PROCEDURE — 84484 ASSAY OF TROPONIN QUANT: CPT | Performed by: EMERGENCY MEDICINE

## 2023-10-03 PROCEDURE — 83735 ASSAY OF MAGNESIUM: CPT | Performed by: EMERGENCY MEDICINE

## 2023-10-03 PROCEDURE — 87040 BLOOD CULTURE FOR BACTERIA: CPT | Performed by: EMERGENCY MEDICINE

## 2023-10-03 PROCEDURE — 86900 BLOOD TYPING SEROLOGIC ABO: CPT

## 2023-10-03 PROCEDURE — 99285 EMERGENCY DEPT VISIT HI MDM: CPT

## 2023-10-03 PROCEDURE — 83615 LACTATE (LD) (LDH) ENZYME: CPT | Performed by: INTERNAL MEDICINE

## 2023-10-03 PROCEDURE — 71045 X-RAY EXAM CHEST 1 VIEW: CPT

## 2023-10-03 PROCEDURE — 84100 ASSAY OF PHOSPHORUS: CPT | Performed by: INTERNAL MEDICINE

## 2023-10-03 PROCEDURE — 85610 PROTHROMBIN TIME: CPT | Performed by: EMERGENCY MEDICINE

## 2023-10-03 PROCEDURE — 25010000002 AZTREONAM PER 500 MG: Performed by: EMERGENCY MEDICINE

## 2023-10-03 PROCEDURE — 84466 ASSAY OF TRANSFERRIN: CPT

## 2023-10-03 PROCEDURE — 86900 BLOOD TYPING SEROLOGIC ABO: CPT | Performed by: EMERGENCY MEDICINE

## 2023-10-03 PROCEDURE — 83540 ASSAY OF IRON: CPT

## 2023-10-03 PROCEDURE — 83735 ASSAY OF MAGNESIUM: CPT | Performed by: INTERNAL MEDICINE

## 2023-10-03 PROCEDURE — 83036 HEMOGLOBIN GLYCOSYLATED A1C: CPT

## 2023-10-03 PROCEDURE — 36415 COLL VENOUS BLD VENIPUNCTURE: CPT | Performed by: EMERGENCY MEDICINE

## 2023-10-03 PROCEDURE — 82746 ASSAY OF FOLIC ACID SERUM: CPT

## 2023-10-03 RX ORDER — ACETAMINOPHEN 325 MG/1
650 TABLET ORAL EVERY 4 HOURS PRN
Status: DISCONTINUED | OUTPATIENT
Start: 2023-10-03 | End: 2023-10-09 | Stop reason: HOSPADM

## 2023-10-03 RX ORDER — SODIUM CHLORIDE 0.9 % (FLUSH) 0.9 %
10 SYRINGE (ML) INJECTION EVERY 12 HOURS SCHEDULED
Status: DISCONTINUED | OUTPATIENT
Start: 2023-10-03 | End: 2023-10-09 | Stop reason: HOSPADM

## 2023-10-03 RX ORDER — BISACODYL 5 MG/1
5 TABLET, DELAYED RELEASE ORAL DAILY PRN
Status: DISCONTINUED | OUTPATIENT
Start: 2023-10-03 | End: 2023-10-09 | Stop reason: HOSPADM

## 2023-10-03 RX ORDER — SODIUM CHLORIDE 9 MG/ML
40 INJECTION, SOLUTION INTRAVENOUS AS NEEDED
Status: DISCONTINUED | OUTPATIENT
Start: 2023-10-03 | End: 2023-10-09 | Stop reason: HOSPADM

## 2023-10-03 RX ORDER — DILTIAZEM HCL/D5W 125 MG/125
5-15 PLASTIC BAG, INJECTION (ML) INTRAVENOUS
Status: DISCONTINUED | OUTPATIENT
Start: 2023-10-03 | End: 2023-10-05

## 2023-10-03 RX ORDER — DILTIAZEM HYDROCHLORIDE 5 MG/ML
10 INJECTION INTRAVENOUS ONCE
Status: COMPLETED | OUTPATIENT
Start: 2023-10-03 | End: 2023-10-03

## 2023-10-03 RX ORDER — FUROSEMIDE 10 MG/ML
40 INJECTION INTRAMUSCULAR; INTRAVENOUS ONCE
Status: COMPLETED | OUTPATIENT
Start: 2023-10-03 | End: 2023-10-03

## 2023-10-03 RX ORDER — FERROUS SULFATE 325(65) MG
1 TABLET ORAL
COMMUNITY

## 2023-10-03 RX ORDER — PANTOPRAZOLE SODIUM 40 MG/10ML
80 INJECTION, POWDER, LYOPHILIZED, FOR SOLUTION INTRAVENOUS ONCE
Status: COMPLETED | OUTPATIENT
Start: 2023-10-03 | End: 2023-10-03

## 2023-10-03 RX ORDER — BISACODYL 10 MG
10 SUPPOSITORY, RECTAL RECTAL DAILY PRN
Status: DISCONTINUED | OUTPATIENT
Start: 2023-10-03 | End: 2023-10-09 | Stop reason: HOSPADM

## 2023-10-03 RX ORDER — AMOXICILLIN 250 MG
2 CAPSULE ORAL 2 TIMES DAILY
Status: DISCONTINUED | OUTPATIENT
Start: 2023-10-03 | End: 2023-10-09 | Stop reason: HOSPADM

## 2023-10-03 RX ORDER — NITROGLYCERIN 0.4 MG/1
0.4 TABLET SUBLINGUAL
Status: DISCONTINUED | OUTPATIENT
Start: 2023-10-03 | End: 2023-10-04

## 2023-10-03 RX ORDER — SODIUM CHLORIDE 0.9 % (FLUSH) 0.9 %
10 SYRINGE (ML) INJECTION AS NEEDED
Status: DISCONTINUED | OUTPATIENT
Start: 2023-10-03 | End: 2023-10-09 | Stop reason: HOSPADM

## 2023-10-03 RX ORDER — POLYETHYLENE GLYCOL 3350 17 G/17G
17 POWDER, FOR SOLUTION ORAL DAILY PRN
Status: DISCONTINUED | OUTPATIENT
Start: 2023-10-03 | End: 2023-10-09 | Stop reason: HOSPADM

## 2023-10-03 RX ORDER — PANTOPRAZOLE SODIUM 40 MG/1
1 TABLET, DELAYED RELEASE ORAL DAILY
COMMUNITY

## 2023-10-03 RX ADMIN — AZTREONAM 2 G: 2 INJECTION, POWDER, LYOPHILIZED, FOR SOLUTION INTRAMUSCULAR; INTRAVENOUS at 07:37

## 2023-10-03 RX ADMIN — Medication 5 MG/HR: at 09:15

## 2023-10-03 RX ADMIN — DOCUSATE SODIUM 50 MG AND SENNOSIDES 8.6 MG 2 TABLET: 8.6; 5 TABLET, FILM COATED ORAL at 20:09

## 2023-10-03 RX ADMIN — PANTOPRAZOLE SODIUM 80 MG: 40 INJECTION, POWDER, LYOPHILIZED, FOR SOLUTION INTRAVENOUS at 09:10

## 2023-10-03 RX ADMIN — ACETAMINOPHEN 650 MG: 325 TABLET, FILM COATED ORAL at 17:33

## 2023-10-03 RX ADMIN — Medication 10 ML: at 20:09

## 2023-10-03 RX ADMIN — FUROSEMIDE 40 MG: 10 INJECTION, SOLUTION INTRAMUSCULAR; INTRAVENOUS at 14:17

## 2023-10-03 RX ADMIN — DILTIAZEM HYDROCHLORIDE 10 MG: 5 INJECTION, SOLUTION INTRAVENOUS at 10:47

## 2023-10-03 RX ADMIN — Medication 5 MG/HR: at 22:50

## 2023-10-03 NOTE — CASE MANAGEMENT/SOCIAL WORK
Discharge Planning Assessment   Jose     Patient Name: Tricia Marroquin  MRN: [id number]  Today's Date: 10/3/2023    Admit Date: 10/3/2023    Plan: Home   Discharge Needs Assessment       Row Name 10/03/23 1406       Living Environment    People in Home alone    Current Living Arrangements home    Potentially Unsafe Housing Conditions none    Primary Care Provided by self    Provides Primary Care For no one       Resource/Environmental Concerns    Resource/Environmental Concerns none    Transportation Concerns none       Food Insecurity    Within the past 12 months, you worried that your food would run out before you got the money to buy more. Never true    Within the past 12 months, the food you bought just didn't last and you didn't have money to get more. Never true       Transition Planning    Patient/Family Anticipates Transition to home    Patient/Family Anticipated Services at Transition none    Transportation Anticipated family or friend will provide       Discharge Needs Assessment    Readmission Within the Last 30 Days no previous admission in last 30 days    Equipment Currently Used at Home none    Concerns to be Addressed denies needs/concerns at this time    Anticipated Changes Related to Illness none    Equipment Needed After Discharge none                   Discharge Plan       Row Name 10/03/23 1406       Plan    Plan Home    Plan Comments Met with Patient at bedside Lives at home alone. Did have Medicaid Caregiver 3 days a week but previous one quit and they are working on finding her a replacement. Son does come over and assist including transprotation and shopping. pcp and Pharmacy verified, able to afford medications. d/c barriers: Cardiology following Cardizem drip                  Continued Care and Services - Admitted Since 10/3/2023       Community Resources       Service Provider Request Status Selected Services Address Phone Fax Patient Preferred    [facility] Pending - Request  Sent N/A [address] [place] [address] [phone] --                  Expected Discharge Date and Time       Expected Discharge Date Expected Discharge Time    Oct 4, 2023            Demographic Summary       Row Name 10/03/23 1405       General Information    Admission Type observation    Arrived From emergency department    Required Notices Provided Observation Status Notice    Referral Source admission list    Reason for Consult discharge planning    Preferred Language English                   Functional Status       Row Name 10/03/23 1405       Functional Status    Usual Activity Tolerance moderate    Current Activity Tolerance moderate       Functional Status, IADL    Medications independent    Meal Preparation independent    Housekeeping assistive person    Laundry assistive person    Shopping assistive person       Mental Status    General Appearance WDL WDL       Mental Status Summary    Recent Changes in Mental Status/Cognitive Functioning no changes                   Psychosocial    No documentation.                  Abuse/Neglect    No documentation.                  Legal    No documentation.                  Substance Abuse    No documentation.                  Patient Forms    No documentation.                     Candice Metzger RN

## 2023-10-03 NOTE — ED PROVIDER NOTES
"Subjective   History of Present Illness  Chief complaint: Patient is a 88-year-old female who has been progressively short of breath for a week.  It was severe in the night tonight so she presented to the emergency department.  She has been noticing her heart racing intermittently.  She does have a history of A-fib.  She takes aspirin and Plavix.  She has not had Plavix for a day as she ran out.  She does not have any chest pain.  No new swelling.  No fever.  No cough.  She is having increasing fatigue and generalized weakness.    Context:    Duration:    Timing:    Severity: Severe    Associated Symptoms:        PCP:  LMP:      Review of Systems   Constitutional:  Positive for fatigue. Negative for fever.   HENT: Negative.     Respiratory:  Positive for shortness of breath.    Cardiovascular:  Positive for palpitations. Negative for chest pain and leg swelling.   Gastrointestinal:  Negative for abdominal pain.   Genitourinary: Negative.    Musculoskeletal: Negative.        Past Medical History:   Diagnosis Date    Hypertension     Seasonal allergies        Allergies   Allergen Reactions    Aspirin Other (See Comments)     Pt reports \"makes my heart go crazy\"    Codeine GI Intolerance    Penicillins Hives    Lisinopril Cough       Past Surgical History:   Procedure Laterality Date    AORTA SURGERY      BACK SURGERY      CARDIAC CATHETERIZATION N/A 9/23/2022    Procedure: Left Heart Cath;  Surgeon: Rodriguez Glass MD;  Location: [facility] CATH INVASIVE LOCATION;  Service: Cardiovascular;  Laterality: N/A;    CORONARY ANGIOPLASTY WITH STENT PLACEMENT  01/06/2022       Family History   Problem Relation Age of Onset    Heart disease Mother     Heart disease Father     Heart attack Father     Lung cancer Brother         associated to intense cigarette smoking       Social History     Socioeconomic History    Marital status:    Tobacco Use    Smoking status: Former     Packs/day: 2.00     Years: 40.00     " Pack years: 80.00     Types: Cigarettes     Start date:      Quit date:      Years since quittin.7    Smokeless tobacco: Never   Vaping Use    Vaping Use: Never used   Substance and Sexual Activity    Alcohol use: Never    Drug use: Never    Sexual activity: Defer           Objective   Physical Exam  Vitals and nursing note reviewed.   Constitutional:       Appearance: She is ill-appearing.   HENT:      Head: Normocephalic and atraumatic.   Cardiovascular:      Rate and Rhythm: Tachycardia present. Rhythm irregular.   Pulmonary:      Effort: Pulmonary effort is normal.      Breath sounds: Decreased breath sounds present.   Abdominal:      Palpations: Abdomen is soft.      Tenderness: There is no abdominal tenderness.   Skin:     General: Skin is warm and dry.      Coloration: Skin is pale.   Neurological:      General: No focal deficit present.      Mental Status: She is alert and oriented to person, place, and time.   Psychiatric:         Mood and Affect: Mood normal.         Procedures           ED Course      Results for orders placed or performed during the hospital encounter of 10/03/23   Comprehensive Metabolic Panel    Specimen: Blood   Result Value Ref Range    Glucose 110 (H) 65 - 99 mg/dL    BUN 37 (H) 8 - 23 mg/dL    Creatinine 1.65 (H) 0.57 - 1.00 mg/dL    Sodium 142 136 - 145 mmol/L    Potassium 4.5 3.5 - 5.2 mmol/L    Chloride 108 (H) 98 - 107 mmol/L    CO2 24.0 22.0 - 29.0 mmol/L    Calcium 9.0 8.6 - 10.5 mg/dL    Total Protein 6.3 6.0 - 8.5 g/dL    Albumin 4.0 3.5 - 5.2 g/dL    ALT (SGPT) 13 1 - 33 U/L    AST (SGOT) 25 1 - 32 U/L    Alkaline Phosphatase 66 39 - 117 U/L    Total Bilirubin 0.3 0.0 - 1.2 mg/dL    Globulin 2.3 gm/dL    A/G Ratio 1.7 g/dL    BUN/Creatinine Ratio 22.4 7.0 - 25.0    Anion Gap 10.0 5.0 - 15.0 mmol/L    eGFR 30.1 (L) >60.0 mL/min/1.73   Protime-INR    Specimen: Blood   Result Value Ref Range    Protime 11.0 9.6 - 11.7 Seconds    INR 1.01 0.93 - 1.10   aPTT     Specimen: Blood   Result Value Ref Range    PTT 26.7 (L) 61.0 - 76.5 seconds   BNP    Specimen: Blood   Result Value Ref Range    proBNP 2,457.0 (H) 0.0 - 1,800.0 pg/mL   High Sensitivity Troponin T    Specimen: Blood   Result Value Ref Range    HS Troponin T 18 (H) <10 ng/L   Magnesium    Specimen: Blood   Result Value Ref Range    Magnesium 2.4 1.6 - 2.4 mg/dL   TSH    Specimen: Blood   Result Value Ref Range    TSH 6.010 (H) 0.270 - 4.200 uIU/mL   CBC Auto Differential    Specimen: Blood   Result Value Ref Range    WBC 68.00 (C) 3.40 - 10.80 10*3/mm3    RBC 2.33 (L) 3.77 - 5.28 10*6/mm3    Hemoglobin 7.8 (L) 12.0 - 15.9 g/dL    Hematocrit 24.8 (L) 34.0 - 46.6 %    .3 (H) 79.0 - 97.0 fL    MCH 33.3 (H) 26.6 - 33.0 pg    MCHC 31.3 (L) 31.5 - 35.7 g/dL    RDW 21.7 (H) 12.3 - 15.4 %    RDW-SD 80.9 (H) 37.0 - 54.0 fl    MPV 9.9 6.0 - 12.0 fL    Platelets 58 (L) 140 - 450 10*3/mm3   Scan Slide    Specimen: Blood   Result Value Ref Range    Scan Slide     Manual Differential    Specimen: Blood   Result Value Ref Range    Neutrophil % 6.0 (L) 42.7 - 76.0 %    Lymphocyte % 6.0 (L) 19.6 - 45.3 %    Monocyte % 23.0 (H) 5.0 - 12.0 %    Bands %  13.0 (H) 0.0 - 5.0 %    Metamyelocyte % 6.0 (H) 0.0 - 0.0 %    Myelocyte % 6.0 (H) 0.0 - 0.0 %    Promyelocyte % 15.0 (H) 0.0 - 0.0 %    Atypical Lymphocyte % 3.0 0.0 - 5.0 %    Blasts % 22.0 (H) 0.0 - 0.0 %    Neutrophils Absolute 12.92 (H) 1.70 - 7.00 10*3/mm3    Lymphocytes Absolute 6.12 (H) 0.70 - 3.10 10*3/mm3    Monocytes Absolute 15.64 (H) 0.10 - 0.90 10*3/mm3    Anisocytosis Slight/1+ None Seen    Dacrocytes Slight/1+ None Seen    Macrocytes Slight/1+ None Seen    Poikilocytes Slight/1+ None Seen    Polychromasia Slight/1+ None Seen    WBC Morphology Normal Normal    Platelet Estimate Decreased Normal   Path Consult Reflex    Specimen: Blood   Result Value Ref Range    Pathology Review Yes    ECG 12 Lead Chest Pain   Result Value Ref Range    QT Interval 315 ms    QTC  Interval 424 ms        XR Chest 1 View    Result Date: 10/3/2023  Impression: No active disease Electronically Signed: Francis Shah MD  10/3/2023 5:42 AM EDT  Workstation ID: [id number]                                     Medical Decision Making  Patient was seen and evaluated for the above problem    Differential diagnosis includes but is not limited to PE, pneumonia, A-fib with RVR, CHF    Patient does have elevated BNP which is new for her.  Troponin is mildly elevated as well.  Her chest x-ray shows no active disease at this point time.  Hemoglobin less than 8 potentially could be causing symptoms of progressive dyspnea.  With elevated BNP will place in the hospital for further evaluation and management from cardiac causes.  Also stress of anemia could potentiate this.  Patient will be evaluated further if CT scan of chest can be obtained as needed for PE rule out if that is thought possibility after further evaluation.  Discussed with Marquita on-call for the hospital staff agrees to admit the patient.    Problems Addressed:  Atrial fibrillation with RVR: complicated acute illness or injury  Dyspnea, unspecified type: complicated acute illness or injury  Leukocytosis, unspecified type: complicated acute illness or injury    Amount and/or Complexity of Data Reviewed  Labs: ordered. Decision-making details documented in ED Course.     Details: Labs reviewed by myself  Radiology: ordered and independent interpretation performed. Decision-making details documented in ED Course.     Details: Chest x-ray reviewed by myself some  ECG/medicine tests: ordered and independent interpretation performed.     Details: EKG interpreted by myself sinus or ectopic atrial tachycardia PACs rate of 112.  A-fib.    Risk  Prescription drug management.  Decision regarding hospitalization.        Final diagnoses:   None   Dyspnea  Leukocytosis  Symptomatic anemia  Chf  A fib w rvr      ED Disposition  ED Disposition       None             No follow-up provider specified.       Medication List      No changes were made to your prescriptions during this visit.            Constantin Griffin,   10/10/23 0879

## 2023-10-03 NOTE — PLAN OF CARE
Problem: Adult Inpatient Plan of Care  Goal: Absence of Hospital-Acquired Illness or Injury  Intervention: Identify and Manage Fall Risk  Recent Flowsheet Documentation  Taken 10/3/2023 1655 by Melly Keyes RN  Safety Promotion/Fall Prevention:   assistive device/personal items within reach   clutter free environment maintained   fall prevention program maintained   lighting adjusted   nonskid shoes/slippers when out of bed   room organization consistent   safety round/check completed  Intervention: Prevent Skin Injury  Recent Flowsheet Documentation  Taken 10/3/2023 1655 by Melly Keyes RN  Body Position: position changed independently  Intervention: Prevent and Manage VTE (Venous Thromboembolism) Risk  Recent Flowsheet Documentation  Taken 10/3/2023 1655 by Melly Keyes RN  Activity Management: activity encouraged  Range of Motion: active ROM (range of motion) encouraged  Intervention: Prevent Infection  Recent Flowsheet Documentation  Taken 10/3/2023 1655 by Melly Keyes RN  Infection Prevention:   hand hygiene promoted   personal protective equipment utilized  Goal: Optimal Comfort and Wellbeing  Intervention: Provide Person-Centered Care  Recent Flowsheet Documentation  Taken 10/3/2023 1655 by Melly Keyes RN  Trust Relationship/Rapport: care explained     Problem: Pain Acute  Goal: Acceptable Pain Control and Functional Ability  Intervention: Prevent or Manage Pain  Recent Flowsheet Documentation  Taken 10/3/2023 1655 by Melly Keyes RN  Medication Review/Management: medications reviewed  Intervention: Optimize Psychosocial Wellbeing  Recent Flowsheet Documentation  Taken 10/3/2023 1655 by Melly Keyes RN  Supportive Measures: active listening utilized  Diversional Activities: television     Problem: Heart Failure Comorbidity  Goal: Maintenance of Heart Failure Symptom Control  Intervention: Maintain Heart Failure-Management  Recent Flowsheet Documentation  Taken 10/3/2023 1655 by Melly Keyes  RN  Medication Review/Management: medications reviewed     Problem: Hypertension Comorbidity  Goal: Blood Pressure in Desired Range  Intervention: Maintain Blood Pressure Management  Recent Flowsheet Documentation  Taken 10/3/2023 1655 by Melly Keyes RN  Medication Review/Management: medications reviewed     Problem: Skin Injury Risk Increased  Goal: Skin Health and Integrity  Intervention: Optimize Skin Protection  Recent Flowsheet Documentation  Taken 10/3/2023 1655 by Melly Keyes RN  Pressure Reduction Techniques: frequent weight shift encouraged  Head of Bed (HOB) Positioning: HOB elevated  Pressure Reduction Devices: pressure-redistributing mattress utilized   Goal Outcome Evaluation:            Patient arrived to the floor on Cardizem gtt. Patient has been titrated down to 5 ml/hr. C/o pain in Lower R back. PRN meds given per MAR. Possible bone marrow biopsy in the AM. Patient resting in bed at this time. Call light in reach. Plan of care to be continued.

## 2023-10-03 NOTE — CONSULTS
Hematology/Oncology Inpatient Consultation    Patient name: Tricia Marroquin  : 1935  MRN: [id number]  Referring Provider: Dr Glass  Reason for Consultation: Thrombocytopenia, leukocytosis    Chief complaint: Difficulty breathing    History of present illness:    Tricia Marroquin is a 88 y.o. female who presented to [facility] on 10/3/2023 with complaints of difficulty breathing and irregular heartbeats.  Patient had denied chest pain but has had worsening orthopnea.  She had some back stools as well prior to presentation to the hospital.  In the emergency room she was found to have A-fib elevated proBNP, significant leukocytosis and thrombocytopenia.  She also was found to have acute kidney injury with a creatinine of 1.6.  Review of her CBC showed a white count of 68,000, hemoglobin 7.8, MCV was 106 and platelets of 58,000 with evidence of leukoerythroblastic changes, up to 22% blast forms, promyelocytes myelocytes.  PT was normal at 11, INR is 1 PTT was 26.7 and magnesium was normal at 2.4.  She has a normal calcium and iron studies were unremarkable.    Her peripheral smear also concurs with the above blast forms    10/03/23  Hematology/Oncology was consulted number cytopenia.  Patient was seen by Dr. Paez back in .  Her thrombocytopenia was dating back to  with platelet counts ranging between 57 and 10 4000 mild anemia and leukopenia.  Patient was recommended to follow-up in the outpatient setting for possible bone marrow biopsy.    He/She  has a past medical history of Hypertension and Seasonal allergies.    PCP: Danish Herbert MD    History:  Past Medical History:   Diagnosis Date    Hypertension     Seasonal allergies    ,   Past Surgical History:   Procedure Laterality Date    AORTA SURGERY      BACK SURGERY      CARDIAC CATHETERIZATION N/A 2022    Procedure: Left Heart Cath;  Surgeon: Rodriguez Glass MD;  Location: [facility] INVASIVE LOCATION;   Service: Cardiovascular;  Laterality: N/A;    CORONARY ANGIOPLASTY WITH STENT PLACEMENT  2022   ,   Family History   Problem Relation Age of Onset    Heart disease Mother     Heart disease Father     Heart attack Father     Lung cancer Brother         associated to intense cigarette smoking   ,   Social History     Tobacco Use    Smoking status: Former     Packs/day: 2.00     Years: 40.00     Pack years: 80.00     Types: Cigarettes     Start date:      Quit date:      Years since quittin.7    Smokeless tobacco: Never   Vaping Use    Vaping Use: Never used   Substance Use Topics    Alcohol use: Never    Drug use: Never   ,   Medications Prior to Admission   Medication Sig Dispense Refill Last Dose    aspirin 81 MG chewable tablet Chew 1 tablet Daily.       atorvastatin (LIPITOR) 20 MG tablet Take 1 tablet by mouth Daily. 90 tablet 3     candesartan-hydrochlorothiazide (Atacand HCT) 32-12.5 MG per tablet Take 1 tablet by mouth Daily. 30 tablet 11     carvedilol (COREG) 12.5 MG tablet Take 1 tablet by mouth 2 (Two) Times a Day With Meals. 180 tablet 1     clopidogrel (PLAVIX) 75 MG tablet Take 1 tablet by mouth Daily.       ferrous sulfate 325 (65 FE) MG tablet Take 1 tablet by mouth Daily With Breakfast.       fluticasone (FLONASE) 50 MCG/ACT nasal spray 2 sprays into the nostril(s) as directed by provider Daily.       loratadine (CLARITIN) 10 MG tablet Take 1 tablet by mouth Daily.       Multiple Vitamins-Minerals (Viteyes AREDS Formula) capsule Take 1 capsule by mouth Daily.       nitroglycerin (NITROSTAT) 0.4 MG SL tablet Place 1 tablet under the tongue Every 5 (Five) Minutes As Needed for Chest Pain. Take no more than 3 doses in 15 minutes.       pantoprazole (PROTONIX) 40 MG EC tablet Take 1 tablet by mouth Daily.       spironolactone (ALDACTONE) 25 MG tablet TAKE 1 TABLET BY MOUTH EVERY DAY 90 tablet 2    , Scheduled Meds:  senna-docusate sodium, 2 tablet, Oral, BID  sodium chloride, 10 mL,  "Intravenous, Q12H    , Continuous Infusions:  dilTIAZem, 5-15 mg/hr, Last Rate: 10 mg/hr (10/03/23 0974)    , PRN Meds:    acetaminophen    senna-docusate sodium **AND** polyethylene glycol **AND** bisacodyl **AND** bisacodyl    nitroglycerin    [COMPLETED] Insert Peripheral IV **AND** sodium chloride    sodium chloride    sodium chloride   Allergies:  Aspirin, Codeine, Penicillins, and Lisinopril    Subjective     ROS:  Review of Systems   Constitutional:  Positive for fatigue. Negative for chills and fever.   HENT:  Negative for congestion, drooling, ear discharge, rhinorrhea, sinus pressure and tinnitus.    Eyes:  Negative for photophobia, pain and discharge.   Respiratory:  Positive for cough and shortness of breath. Negative for apnea, choking and stridor.    Cardiovascular:  Negative for palpitations.   Gastrointestinal:  Negative for abdominal distention, abdominal pain and anal bleeding.   Endocrine: Negative for polydipsia and polyphagia.   Genitourinary:  Negative for decreased urine volume, flank pain and genital sores.   Musculoskeletal:  Negative for gait problem, neck pain and neck stiffness.   Skin:  Negative for color change, rash and wound.   Neurological:  Negative for tremors, seizures, syncope, facial asymmetry and speech difficulty.   Hematological:  Negative for adenopathy.   Psychiatric/Behavioral:  Negative for agitation, confusion, hallucinations and self-injury. The patient is not hyperactive.       Objective   Vital Signs:   /41   Pulse 73   Temp 97.6 øF (36.4 øC) (Oral)   Resp 22   Ht 154.9 cm (61\")   Wt 79.1 kg (174 lb 6.1 oz)   SpO2 95%   BMI 32.95 kg/mý     Physical Exam: (performed by MD)  Physical Exam  Constitutional:       Appearance: Normal appearance. She is ill-appearing.   HENT:      Mouth/Throat:      Mouth: Mucous membranes are moist.   Eyes:      Extraocular Movements: Extraocular movements intact.      Conjunctiva/sclera: Conjunctivae normal.      Pupils: " Pupils are equal, round, and reactive to light.   Pulmonary:      Effort: Pulmonary effort is normal.      Breath sounds: No wheezing or rhonchi.   Abdominal:      General: Abdomen is flat. Bowel sounds are normal.   Neurological:      General: No focal deficit present.      Mental Status: She is alert and oriented to person, place, and time. Mental status is at baseline.   Psychiatric:         Mood and Affect: Mood normal.         Behavior: Behavior normal.         Thought Content: Thought content normal.         Judgment: Judgment normal.       Results Review:  Lab Results (last 48 hours)       Procedure Component Value Units Date/Time    Pathology Consultation [[id number]] Collected: 10/03/23 0524    Specimen: Blood, Venous Line Updated: 10/03/23 1543     Final Diagnosis --     Marked leukocytosis with left shift and numerous circulating blasts (22%)  Macrocytic anemia  Thrombocytopenia    Comment: Flow cytometry is recommended to further evaluate for leukemia. Bone marrow biopsy should be considered clinically.        Case Report --     Surgical Pathology Report                         Case: [id number]                                  Authorizing Provider:  Constantin Griffin DO  Collected:           10/03/2023 05:24 AM          Ordering Location:     [facility]       Received:            10/03/2023 07:27 AM                                 EMERGENCY DEPARTMENT                                                         Pathologist:           Emile Conway MD                                                             Specimen:    Blood, Venous Line                                                                         Vitamin B12 [[id number]] Collected: 10/03/23 0950    Specimen: Blood Updated: 10/03/23 1006    Folate [[id number]] Collected: 10/03/23 0950    Specimen: Blood Updated: 10/03/23 1006    Hemoglobin A1c [[id number]]  (Abnormal) Collected: 10/03/23 0524    Specimen: Blood Updated: 10/03/23 0954      Hemoglobin A1C 6.40 %     Iron Profile [[id number]]  (Normal) Collected: 10/03/23 0724    Specimen: Blood Updated: 10/03/23 0931     Iron 123 mcg/dL      Iron Saturation (TSAT) 35 %      Transferrin 235 mg/dL      TIBC 350 mcg/dL     High Sensitivity Troponin T 2Hr [[id number]]  (Abnormal) Collected: 10/03/23 0724    Specimen: Blood Updated: 10/03/23 0800     HS Troponin T 17 ng/L      Troponin T Delta -1 ng/L     Narrative:      High Sensitive Troponin T Reference Range:  <10.0 ng/L- Negative Female for AMI  <15.0 ng/L- Negative Male for AMI  >=10 - Abnormal Female indicating possible myocardial injury.  >=15 - Abnormal Male indicating possible myocardial injury.   Clinicians would have to utilize clinical acumen, EKG, Troponin, and serial changes to determine if it is an Acute Myocardial Infarction or myocardial injury due to an underlying chronic condition.         POC Lactate [[id number]]  (Normal) Collected: 10/03/23 0727    Specimen: Blood Updated: 10/03/23 0729     Lactate 0.6 mmol/L      Comment: Serial Number: [id number]:  [id number]       Blood Culture - Blood, Arm, Right [[id number]] Collected: 10/03/23 0724    Specimen: Blood from Arm, Right Updated: 10/03/23 0728    Blood Culture - Blood, Wrist, Left [[id number]] Collected: 10/03/23 0700    Specimen: Blood from Wrist, Left Updated: 10/03/23 0710    CBC & Differential [[id number]]  (Abnormal) Collected: 10/03/23 0524    Specimen: Blood Updated: 10/03/23 0610    Narrative:      The following orders were created for panel order CBC & Differential.  Procedure                               Abnormality         Status                     ---------                               -----------         ------                     CBC Auto Differential[[id number]]        Abnormal            Final result               Scan Slide[[id number]]                                       Final result                 Please view results for these tests on the individual orders.     Scan Slide [[id number]] Collected: 10/03/23 0524    Specimen: Blood Updated: 10/03/23 0610     Scan Slide --     Comment: See Manual Differential Results       Manual Differential [[id number]]  (Abnormal) Collected: 10/03/23 0524    Specimen: Blood Updated: 10/03/23 0610     Neutrophil % 6.0 %      Lymphocyte % 6.0 %      Monocyte % 23.0 %      Bands %  13.0 %      Metamyelocyte % 6.0 %      Myelocyte % 6.0 %      Promyelocyte % 15.0 %      Atypical Lymphocyte % 3.0 %      Blasts % 22.0 %      Neutrophils Absolute 12.92 10*3/mm3      Lymphocytes Absolute 6.12 10*3/mm3      Monocytes Absolute 15.64 10*3/mm3      Anisocytosis Slight/1+     Dacrocytes Slight/1+     Macrocytes Slight/1+     Poikilocytes Slight/1+     Polychromasia Slight/1+     WBC Morphology Normal     Platelet Estimate Decreased    Path Consult Reflex [[id number]] Collected: 10/03/23 0524    Specimen: Blood Updated: 10/03/23 0610     Pathology Review Yes    CBC Auto Differential [[id number]]  (Abnormal) Collected: 10/03/23 0524    Specimen: Blood Updated: 10/03/23 0610     WBC 68.00 10*3/mm3      RBC 2.33 10*6/mm3      Hemoglobin 7.8 g/dL      Hematocrit 24.8 %      .3 fL      MCH 33.3 pg      MCHC 31.3 g/dL      RDW 21.7 %      RDW-SD 80.9 fl      MPV 9.9 fL      Platelets 58 10*3/mm3     Narrative:      The previously reported component NRBC is no longer being reported. Previous result was 0.0 /100 WBC (Reference Range: 0.0-0.2 /100 WBC) on 10/3/2023 at 0541 EDT.    BNP [[id number]]  (Abnormal) Collected: 10/03/23 0524    Specimen: Blood Updated: 10/03/23 0610     proBNP 2,457.0 pg/mL     Narrative:      This assay is used as an aid in the diagnosis of individuals suspected of having heart failure. It can be used as an aid in the diagnosis of acute decompensated heart failure (ADHF) in patients presenting with signs and symptoms of ADHF to the emergency department (ED). In addition, NT-proBNP of <300 pg/mL indicates ADHF is not likely.    TSH  [[id number]]  (Abnormal) Collected: 10/03/23 0524    Specimen: Blood Updated: 10/03/23 0610     TSH 6.010 uIU/mL     High Sensitivity Troponin T [[id number]]  (Abnormal) Collected: 10/03/23 0524    Specimen: Blood Updated: 10/03/23 0610     HS Troponin T 18 ng/L     Narrative:      High Sensitive Troponin T Reference Range:  <10.0 ng/L- Negative Female for AMI  <15.0 ng/L- Negative Male for AMI  >=10 - Abnormal Female indicating possible myocardial injury.  >=15 - Abnormal Male indicating possible myocardial injury.   Clinicians would have to utilize clinical acumen, EKG, Troponin, and serial changes to determine if it is an Acute Myocardial Infarction or myocardial injury due to an underlying chronic condition.         Magnesium [[id number]]  (Normal) Collected: 10/03/23 0524    Specimen: Blood Updated: 10/03/23 0558     Magnesium 2.4 mg/dL     Comprehensive Metabolic Panel [[id number]]  (Abnormal) Collected: 10/03/23 0524    Specimen: Blood Updated: 10/03/23 0558     Glucose 110 mg/dL      BUN 37 mg/dL      Creatinine 1.65 mg/dL      Sodium 142 mmol/L      Potassium 4.5 mmol/L      Chloride 108 mmol/L      CO2 24.0 mmol/L      Calcium 9.0 mg/dL      Total Protein 6.3 g/dL      Albumin 4.0 g/dL      ALT (SGPT) 13 U/L      AST (SGOT) 25 U/L      Alkaline Phosphatase 66 U/L      Total Bilirubin 0.3 mg/dL      Globulin 2.3 gm/dL      A/G Ratio 1.7 g/dL      BUN/Creatinine Ratio 22.4     Anion Gap 10.0 mmol/L      eGFR 30.1 mL/min/1.73     Narrative:      GFR Normal >60  Chronic Kidney Disease <60  Kidney Failure <15    The GFR formula is only valid for adults with stable renal function between ages 18 and 70.    Protime-INR [[id number]]  (Normal) Collected: 10/03/23 0524    Specimen: Blood Updated: 10/03/23 0545     Protime 11.0 Seconds      INR 1.01    aPTT [[id number]]  (Abnormal) Collected: 10/03/23 0524    Specimen: Blood Updated: 10/03/23 0545     PTT 26.7 seconds              Pending Results:     Imaging Reviewed:    XR Chest 1 View    Result Date: 10/3/2023  Impression: No active disease Electronically Signed: Francis Shah MD  10/3/2023 5:42 AM EDT  Workstation ID: [id number]          Assessment & Plan   ASSESSMENT  Leukocytosis with leukoerythroblastic changes up to 22% blast forms worrisome for myelo infiltrative process, AML  Thrombocytopenia likely secondary to primary bone marrow disease.  So far coagulation panel is normal.  We will add fibrinogen level  Sinus tachycardia with Wenckebach second-degree AV block  Acute heart failure due to diastolic dysfunction    PLANS:  We will send her peripheral blood for flow cytometry suspect that she has acute leukemia  Bone marrow aspiration and biopsy  Check fibrinogen level, blood cultures urine cultures  Check uric acid level, LDH, reticulocyte count  Begin allopurinol 100 mg daily  Check tumor lysis labs as well  Thank you for the consult  Discussed with patient  We will follow closely    Electronically signed by Lynn Gutierrez MD, 10/03/23, 5:04 PM EDT.        Thank you for this consult. We will be happy to follow along with you.

## 2023-10-03 NOTE — DISCHARGE PLACEMENT REQUEST
"Edita Marroquin (88 y.o. Female)       Date of Birth   1935    Social Security Number       Address   [address] [place] [facility]    Home Phone   [phone]    MRN   [id number]       Latter day   Yazidism    Marital Status                               Admission Date   10/3/23    Admission Type   Emergency    Admitting Provider   Amy Villagomez MD    Attending Provider   Amy Villagomez MD    Department, Room/Bed   [facility] EMERGENCY DEPARTMENT, 21/21       Discharge Date       Discharge Disposition       Discharge Destination                                 Attending Provider: Amy Villagomez MD    Allergies: Aspirin, Codeine, Penicillins, Lisinopril    Isolation: None   Infection: None   Code Status: CPR    Ht: 154.9 cm (61\")   Wt: 77.1 kg (170 lb)    Admission Cmt: None   Principal Problem: Dyspnea [R06.00]                   Active Insurance as of 10/3/2023       Primary Coverage       Payor Plan Insurance Group Employer/Plan Group    ANTHEM MEDICARE REPLACEMENT ANTHEM MEDICARE ADVANTAGE [id number]       Payor Plan Address Payor Plan Phone Number Payor Plan Fax Number Effective Dates    [address] [phone]  1/1/2023 - None Entered    [facility] [address]         Subscriber Name Subscriber Birth Date Member ID       EDITA MARROQUIN 1935 [id number]               Secondary Coverage       Payor Plan Insurance Group Employer/Plan Group    [place]        Payor Plan Address Payor Plan Phone Number Payor Plan Fax Number Effective Dates    [address]   4/12/2022 - None Entered    [place] [address]         Subscriber Name Subscriber Birth Date Member ID       EDITA MARROQUIN 1935 [id number]                     Emergency Contacts        (Rel.) Home Phone Work Phone Mobile Phone    MEGHA MARROQUIN (Son) [phone] -- [phone]              "

## 2023-10-03 NOTE — CONSULTS
Cardiology Consult Note    Patient Identification:  Name: Tricia Marroquin  Age: 88 y.o.  Sex: female  :  1935  MRN: [id number]             Requesting Physician :  CHELSI Alaniz hospitalist     Reason for Consultation / Chief Complaint : patient co-management  Abnormal EKG, CHF     History of Present Illness:      Ms. Tricia Marroquin has PMH of     CAD, stent to LCx 2016, NSTEMI, cath 2022 VALENTINO to in-stent RCA stenosis  Questionable paroxysmal A. fib, no documentation found  Dyspnea due to Brilinta  Hypertension  Dyslipidemia  PAD, aortobifem 10/5/2007, right common femoral arthrectomy with patch angioplasty 2008  Thrombocytopenia  JELENA, back surgery, aorta surgery  Former smoker quit in   Allergy to penicillin  Allergy to aspirin but tolerates baby aspirin  Family history of leukemia in aunt      Presented to the ED on 10/3/2023 with shortness of breath and irregular heart beat.  Patient reports palpitations and irregular heart beat woke her up out of sleep.  She says her heart rate was 114 and pounding.   She denies any chest pain however reports worsening dyspnea on exertion and orthopnea over the last few weeks.  No loss of appetite or lower extremity edema.  Patient also reports one month of dark tarry stools but was also started on iron supplement by PCP.     In the ED EKG showed questionable afib vs ectopic atrial tachycardia.  HS troponin 18--17, pro bnp 2457 BUN 37 creatinine 1.65  eGFR 30 A1C 6.4 TSH 6.0  iron studies unremarkable, lactic normal, WBC 68 hgb 7.8 plts 58   CXR negative       Patient was given IV cardizem bolus and drip was started   Repeat EKG, obtain echocardiogram   GI, hematology/oncology following.  May need nephrology consult if no improvement in renal function     Electronically signed by KAISER Simmons, 10/03/23, 1:01 PM EDT.    Cardiology attending addendum :    I have personally performed a face-to-face diagnostic evaluation, physical exam and  reviewed data on this patient.  I have reviewed documentation done by me and nurse practitioner  and corrected as needed.  And agree with the different components of documentation.Greater than 50% of the time spent in the care of this patient was provided by attending consultant/me.         Presented through ER 10/3/2023 with complaint of palpitations and racing heart going on since 3 AM.  Has intermittent shortness of breath dizziness fatigue and dyspnea on exertion.     Patient was recently sent from [place] ER 9/22/2022 with questionable diagnosis of A. fib.  Review of records from [place] revealed patient was in sinus arrhythmia and PACs but no inocencio A. fib.  Patient was having chest pain at that time therefore was admitted to the hospital        Previous labs:  Labs from [place] 9/16/2022 reveal troponin mildly elevated hemoglobin 10.6 platelet count 71.  proBNP normal at 65.  Glucose elevated at 105.  Patient work-up 4/12/2022 revealed normal troponin, normal proBNP at 138.  Normal CMP, PT PTT.  CBC with low white count at 2.8, hemoglobin at 11.4, platelet count of 64  Chest x-ray 4/12/2022 reveals no acute cardiopulmonary abnormality.  EKG reviewed by me from 4/12/2022 reveals sinus rhythm with sinus bradycardia at the rate of 57 bpm  Labs from 9/15/2022 reveal CBC with a hemoglobin of 10.6, normal CMP and BNP at 65.  Labs from 9/24/2022 revealed A1c of 6.  Lipid profile with cholesterol 106, triglycerides 74, HDL 48, LDL 43    Previous cardiac work-up:  Cath 9/23/2023 PCI to distal RCA in-stent stenosis  Lexiscan Cardiolite 4/13/2022 normal perfusion EF of 78%  Echo 5/9/2022 EF 60 to 65% severely enlarged left atrium by volume.    Data:  Labs 10/3/2023: HS troponin 18-->17.  proBNP 2457.  CMP with a creatinine of 1.65, EGFR 30.  A1c of 6.4, TSH elevated at 6.01.  White count 68,000, hemoglobin 7.8, , platelet count 58 K  Chest x-ray 10/3/2023 no acute cardiopulmonary abnormality.  EKG done 10/3/2023  "reviewed/interpreted by me reveals ectopic atrial tachycardia at the rate of 113 bpm        Assessment:  :       Palpitations  Sinus tachycardia with Wenckebach second-degree AV block  Acute HFpEF due to diastolic dysfunction from anemia and tachycardia.  Marked leukocytosis with left shift and numerous circulating blast cells  Microcytic anemia  Thrombocytopenia  CAD, MI, PCI  PAD history of surgery  Hypertension  Dyslipidemia  Former smoker  Thrombocytopenia  Easy bruising  Diabetes with hemoglobin A1c of 6.4  Prediabetes, labs from 9/24/2022 reveal A1c of 6        Recommendations / Plan:         Telemetry to monitor rhythm  Hematology oncology consultation.  Rate control with IV Cardizem  Serial troponins  Will continue aspirin atorvastatin, clopidogrel, carvedilol, isosorbide, Atacand hydrochlorothiazide, Aldactone, furosemide as tolerated                       Diagnosis Plan   1. Dyspnea, unspecified type        2. Leukocytosis, unspecified type        3. Atrial fibrillation with RVR                   Past Medical History:  Past Medical History:   Diagnosis Date    Hypertension     Seasonal allergies      Past Surgical History:  Past Surgical History:   Procedure Laterality Date    AORTA SURGERY      BACK SURGERY      CARDIAC CATHETERIZATION N/A 9/23/2022    Procedure: Left Heart Cath;  Surgeon: Rodriguez Glass MD;  Location: [facility] CATH INVASIVE LOCATION;  Service: Cardiovascular;  Laterality: N/A;    CORONARY ANGIOPLASTY WITH STENT PLACEMENT  01/06/2022      Allergies:  Allergies   Allergen Reactions    Aspirin Other (See Comments)     Pt reports \"makes my heart go crazy\"    Codeine GI Intolerance    Penicillins Hives    Lisinopril Cough     Home Meds:  (Not in a hospital admission)    Current Meds:     Current Facility-Administered Medications:     acetaminophen (TYLENOL) tablet 650 mg, 650 mg, Oral, Q4H PRN, Marquita Pinedo PA-C    sennosides-docusate (PERICOLACE) 8.6-50 MG per tablet 2 tablet, 2 " tablet, Oral, BID **AND** polyethylene glycol (MIRALAX) packet 17 g, 17 g, Oral, Daily PRN **AND** bisacodyl (DULCOLAX) EC tablet 5 mg, 5 mg, Oral, Daily PRN **AND** bisacodyl (DULCOLAX) suppository 10 mg, 10 mg, Rectal, Daily PRN, Marquita Pinedo PA-C    dilTIAZem (CARDIZEM) 125 mg in 125 mL D5W infusion, 5-15 mg/hr, Intravenous, Titrated, Hottman, Constantin Rueda, DO, Last Rate: 10 mL/hr at 10/03/23 1544, 10 mg/hr at 10/03/23 1544    nitroglycerin (NITROSTAT) SL tablet 0.4 mg, 0.4 mg, Sublingual, Q5 Min PRN, Marquita Pinedo PA-C    [COMPLETED] Insert Peripheral IV, , , Once **AND** sodium chloride 0.9 % flush 10 mL, 10 mL, Intravenous, PRN, HotjarrettanConstantin, DO    sodium chloride 0.9 % flush 10 mL, 10 mL, Intravenous, Q12H, Marquita Pinedo PA-C    sodium chloride 0.9 % flush 10 mL, 10 mL, Intravenous, PRN, Marquita Pinedo PA-C    sodium chloride 0.9 % infusion 40 mL, 40 mL, Intravenous, PRN, PinedoMarquita soto PA-C    Current Outpatient Medications:     aspirin 81 MG chewable tablet, Chew 1 tablet Daily., Disp: , Rfl:     atorvastatin (LIPITOR) 20 MG tablet, Take 1 tablet by mouth Daily., Disp: 90 tablet, Rfl: 3    candesartan-hydrochlorothiazide (Atacand HCT) 32-12.5 MG per tablet, Take 1 tablet by mouth Daily., Disp: 30 tablet, Rfl: 11    carvedilol (COREG) 12.5 MG tablet, Take 1 tablet by mouth 2 (Two) Times a Day With Meals., Disp: 180 tablet, Rfl: 1    clopidogrel (PLAVIX) 75 MG tablet, Take 1 tablet by mouth Daily., Disp: , Rfl:     ferrous sulfate 325 (65 FE) MG tablet, Take 1 tablet by mouth Daily With Breakfast., Disp: , Rfl:     fluticasone (FLONASE) 50 MCG/ACT nasal spray, 2 sprays into the nostril(s) as directed by provider Daily., Disp: , Rfl:     loratadine (CLARITIN) 10 MG tablet, Take 1 tablet by mouth Daily., Disp: , Rfl:     Multiple Vitamins-Minerals (Viteyes AREDS Formula) capsule, Take 1 capsule by mouth Daily., Disp: , Rfl:     nitroglycerin (NITROSTAT) 0.4 MG SL tablet, Place 1 tablet  "under the tongue Every 5 (Five) Minutes As Needed for Chest Pain. Take no more than 3 doses in 15 minutes., Disp: , Rfl:     pantoprazole (PROTONIX) 40 MG EC tablet, Take 1 tablet by mouth Daily., Disp: , Rfl:     spironolactone (ALDACTONE) 25 MG tablet, TAKE 1 TABLET BY MOUTH EVERY DAY, Disp: 90 tablet, Rfl: 2  Social History:   Social History     Tobacco Use    Smoking status: Former     Packs/day: 2.00     Years: 40.00     Pack years: 80.00     Types: Cigarettes     Start date:      Quit date:      Years since quittin.7    Smokeless tobacco: Never   Substance Use Topics    Alcohol use: Never      Family History:  Family History   Problem Relation Age of Onset    Heart disease Mother     Heart disease Father     Heart attack Father     Lung cancer Brother         associated to intense cigarette smoking        Review of Systems : Review of Systems   Constitutional: Positive for malaise/fatigue. Negative for chills and fever.   Cardiovascular:  Positive for dyspnea on exertion, irregular heartbeat and orthopnea. Negative for chest pain and leg swelling.   Respiratory:  Positive for shortness of breath. Negative for cough.    Gastrointestinal:  Positive for melena. Negative for abdominal pain, nausea and vomiting.   Neurological:  Positive for dizziness and light-headedness.   All other systems reviewed and are negative.       Constitutional:  Temp:  [97.9 øF (36.6 øC)-98.9 øF (37.2 øC)] 97.9 øF (36.6 øC)  Heart Rate:  [] 92  Resp:  [16-18] 16  BP: ()/(39-74) 115/43    Physical Exam   /43   Pulse 92   Temp 97.9 øF (36.6 øC) (Oral)   Resp 16   Ht 154.9 cm (61\")   Wt 77.1 kg (170 lb)   SpO2 95%   BMI 32.12 kg/mý   Physical Exam  General:  Appears in no acute distress  Eyes: Sclerae are anicteric,  conjunctivae are clear   HEENT:  No JVD. Thyroid not visibly enlarged. No mucosal pallor or cyanosis  Respiratory: Respirations regular and unlabored at rest.  Bilaterally good breath " sounds with good air entry in all fields. No crackles, rubs or wheezes auscultated  Cardiovascular: S1,S2 irregular rate and rhythm. No murmur, rub or gallop auscultated. No pretibial pitting edema  Gastrointestinal: Abdomen soft, flat, nontender. Bowel sounds present.   Musculoskeletal:  No abnormal movements  Extremities: No digital clubbing or cyanosis  Skin: Color pink. Skin warm and dry to touch. No rashes  No xanthoma  Neuro: Alert and awake, no lateralizing deficits appreciated    Cardiographics  ECG: EKG tracing was  personally reviewed/interpreted by me  ECG 12 Lead Rhythm Change   Preliminary Result   HEART RATE= 83  bpm   RR Interval= 721  ms   OK Interval= 265  ms   P Horizontal Axis= 195  deg   P Front Axis= -58  deg   QRSD Interval= 86  ms   QT Interval= 362  ms   QTcB= 426  ms   QRS Axis= 73  deg   T Wave Axis= 40  deg   - ABNORMAL ECG -   Sinus or ectopic atrial rhythm   Supraventricular bigeminy   Prolonged OK interval   Low voltage, precordial leads   Anteroseptal infarct, old   Electronically Signed By:    Date and Time of Study: 2023-10-03 13:50:19      ECG 12 Lead Chest Pain   Preliminary Result   HEART RATE= 112  bpm   RR Interval= 552  ms   OK Interval= 266  ms   P Horizontal Axis= 207  deg   P Front Axis= 187  deg   QRSD Interval= 85  ms   QT Interval= 315  ms   QTcB= 424  ms   QRS Axis= 74  deg   T Wave Axis= 6  deg   - ABNORMAL ECG -   Sinus or ectopic atrial tachycardia   Atrial premature complexes   Prolonged OK interval   When compared with ECG of 24-Sep-2022 9:22:12,   Significant change in rhythm: previously sinus   New conduction abnormality   Electronically Signed By:    Date and Time of Study: 2023-10-03 04:56:05          Telemetry: Sinus tachycardia with Wenckebach's AV block    Echocardiogram:   Results for orders placed during the hospital encounter of 05/09/22    Adult Transthoracic Echo Complete W/ Cont if Necessary Per Protocol    Interpretation Summary  Normal LV size and  contractility EF of 60 to 65%  Normal RV size  Left atrium appears severely enlarged by volume.  Interatrial septum is not well visualized.  Aortic valve, mitral valve, tricuspid valve appears structurally normal, trace tricuspid regurgitation seen.  Calculated RV systolic pressure of 27 mmHg  No pericardial effusion seen.  Proximal aorta appears normal in size.      Imaging  Chest X-ray:   Imaging Results (Last 24 Hours)       Procedure Component Value Units Date/Time    XR Chest 1 View [[id number]] Collected: 10/03/23 0542     Updated: 10/03/23 0545    Narrative:      XR CHEST 1 VW    Date of Exam: 10/3/2023 5:20 AM EDT    Indication: soa    Comparison: Chest radiograph dated September 23, 2022    Findings:  There are no airspace consolidations. No pleural fluid. No pneumothorax. The pulmonary vasculature appears within normal limits. The cardiac and mediastinal silhouette appear unremarkable. No acute osseous abnormality identified.      Impression:      Impression:  No active disease      Electronically Signed: Francis Sahh MD    10/3/2023 5:42 AM EDT    Workstation ID: [id number]            Lab Review: I have reviewed the labs  Results from last 7 days   Lab Units 10/03/23  0724 10/03/23  0524   HSTROP T ng/L 17* 18*     Results from last 7 days   Lab Units 10/03/23  0524   MAGNESIUM mg/dL 2.4     Results from last 7 days   Lab Units 10/03/23  0524   SODIUM mmol/L 142   POTASSIUM mmol/L 4.5   BUN mg/dL 37*   CREATININE mg/dL 1.65*   CALCIUM mg/dL 9.0         Results from last 7 days   Lab Units 10/03/23  0524   PROBNP pg/mL 2,457.0*     Results from last 7 days   Lab Units 10/03/23  0524   WBC 10*3/mm3 68.00*   HEMOGLOBIN g/dL 7.8*   HEMATOCRIT % 24.8*   PLATELETS 10*3/mm3 58*     Results from last 7 days   Lab Units 10/03/23  0524   INR  1.01   APTT seconds 26.7*             Rodriguez Glass MD  10/3/2023, 16:27 EDT      EMR Dragon/Transcription:   Dictated utilizing Dragon dictation

## 2023-10-03 NOTE — H&P
"    [facility]  History & Physical    Patient Name: Tricia Marroquin  : 1935  MRN: [id number]  Primary Care Physician:  Danish Herbert MD  Date of admission: 10/3/2023  Date and Time of Service: 10/3/23 at 0743    Subjective      Chief Complaint: \"heart is racing\"    History of Present Illness: Tricia Marroquin is a 88 y.o. female who presented to [facility] on 10/3/2023 complaining of a racing heartbeat that has been ongoing since 3 AM this morning.  Patient states she has had intermittent shortness of breath, dizziness and fatigue for the past week.  Patient states her shortness of breath is worse with activity but denies any orthopnea.  Patient also states she has black stools which she attributes to her iron supplements.  Patient denies any dysuria or hematuria.  Patient is unsure if she has ever been diagnosed with congestive heart failure.  Patient states she has had episodes of racing heartbeats in the past but states she is not been evaluated for this issue.  Patient denies any chest pain, left upper extremity pain, or jaw pain.  Patient denies any nausea, vomiting, constipation, cough, syncope, appetite changes, unintentional weight loss, fevers or chills.  Patient states she was having black diarrhea approximately 1 week ago but this is since resolved.  Patient states she does not drink alcohol, does not use tobacco products or does not use any illicit drugs.    In the ED, All vitals stable on admission except /61, pulse 114. All labs unremarkable except high-sensitivity troponin 18, proBNP 2457, chloride 108, BUN 37, creatinine 1.65, EGFR 30.1, glucose 110, TSH 6.01, WBC 68, hemoglobin 7.8, hematocrit 24.8, platelets 58. Patient received diltiazem infusion in ED. Hospitalist was contacted to admit patient for further care and management.     XR Chest 1 View    Result Date: 10/3/2023  Impression: No active disease Electronically Signed: Francis Shah, " MD  10/3/2023 5:42 AM EDT  Workstation ID: [id number]     ECG 12 Lead Chest Pain   Preliminary Result   HEART RATE= 112  bpm   RR Interval= 552  ms   MA Interval= 266  ms   P Horizontal Axis= 207  deg   P Front Axis= 187  deg   QRSD Interval= 85  ms   QT Interval= 315  ms   QTcB= 424  ms   QRS Axis= 74  deg   T Wave Axis= 6  deg   - ABNORMAL ECG -   Sinus or ectopic atrial tachycardia   Atrial premature complexes   Prolonged MA interval   When compared with ECG of 24-Sep-2022 9:22:12,   Significant change in rhythm: previously sinus   New conduction abnormality   Electronically Signed By:    Date and Time of Study: 2023-10-03 04:56:05            ROS 12 point ROS reviewed and negative except as mentioned above.      Personal History     Past Medical History:   Diagnosis Date    Hypertension     Seasonal allergies        Past Surgical History:   Procedure Laterality Date    AORTA SURGERY      BACK SURGERY      CARDIAC CATHETERIZATION N/A 9/23/2022    Procedure: Left Heart Cath;  Surgeon: Rodriguez Glass MD;  Location: [facility] CATH INVASIVE LOC[facility];  Service: Cardiovascular;  Laterality: N/A;    CORONARY ANGIOPLASTY WITH STENT PLACEMENT  01/06/2022       Family History: family history includes Heart attack in her father; Heart disease in her father and mother; Lung cancer in her brother. Otherwise pertinent FHx was reviewed and not pertinent to current issue.    Social History:  reports that she quit smoking about 32 years ago. Her smoking use included cigarettes. She started smoking about 69 years ago. She has a 80.00 pack-year smoking history. She has never used smokeless tobacco. She reports that she does not drink alcohol and does not use drugs.    Home Medications:  Prior to Admission Medications       Prescriptions Last Dose Informant Patient Reported? Taking?    aspirin 81 MG chewable tablet   Yes No    Chew 1 tablet Daily.    atorvastatin (LIPITOR) 20 MG tablet   No No    Take 1 tablet by mouth Daily.  "   candesartan-hydrochlorothiazide (Atacand HCT) 32-12.5 MG per tablet   No No    Take 1 tablet by mouth Daily.    carvedilol (COREG) 12.5 MG tablet   No No    Take 1 tablet by mouth 2 (Two) Times a Day With Meals.    clopidogrel (PLAVIX) 75 MG tablet   Yes No    Take 1 tablet by mouth Daily.    fluorometholone (FML) 0.1 % ophthalmic suspension   Yes No    Administer 1 drop to both eyes 2 (Two) Times a Day.    fluticasone (FLONASE) 50 MCG/ACT nasal spray   Yes No    2 sprays into the nostril(s) as directed by provider Daily.    furosemide (LASIX) 20 MG tablet   Yes No    TAKE 1 TABLET (20 MG) BY MOUTH IF NEEDED (SHORTNESS OF BREATH)    isosorbide mononitrate (IMDUR) 30 MG 24 hr tablet   Yes No    Take 1 tablet by mouth Daily.    loratadine (CLARITIN) 10 MG tablet   Yes No    Take 1 tablet by mouth Daily.    Multiple Vitamins-Minerals (Viteyes AREDS Formula) capsule   Yes No    Take  by mouth.    nitroglycerin (NITROSTAT) 0.4 MG SL tablet   Yes No    Place 1 tablet under the tongue Every 5 (Five) Minutes As Needed for Chest Pain. Take no more than 3 doses in 15 minutes.    spironolactone (ALDACTONE) 25 MG tablet   No No    TAKE 1 TABLET BY MOUTH EVERY DAY              Allergies:  Allergies   Allergen Reactions    Aspirin Other (See Comments)     Pt reports \"makes my heart go crazy\"    Codeine GI Intolerance    Penicillins Hives    Lisinopril Cough       Objective      Vitals:   Temp:  [98.9 øF (37.2 øC)] 98.9 øF (37.2 øC)  Heart Rate:  [100-114] 104  Resp:  [18] 18  BP: (111-142)/(52-74) 142/53    Physical Exam  Vitals and nursing note reviewed.   HENT:      Head: Normocephalic.   Eyes:      Extraocular Movements: Extraocular movements intact.      Pupils: Pupils are equal, round, and reactive to light.   Cardiovascular:      Rate and Rhythm: Tachycardia present. Rhythm irregular.      Pulses: Normal pulses.      Heart sounds: Normal heart sounds.   Pulmonary:      Effort: Pulmonary effort is normal.      Breath " sounds: Normal breath sounds.   Abdominal:      General: Bowel sounds are normal.      Palpations: Abdomen is soft.      Tenderness: There is no abdominal tenderness.   Musculoskeletal:         General: Normal range of motion.      Right lower leg: No edema.      Left lower leg: No edema.   Skin:     General: Skin is warm and dry.   Neurological:      Mental Status: She is alert and oriented to person, place, and time.   Psychiatric:         Mood and Affect: Mood normal.        Result Review    Result Review:  I have personally reviewed the results from the time of this admission to 10/3/2023 08:47 EDT and agree with these findings:  [x]  Laboratory  [x]  Microbiology  [x]  Radiology  [x]  EKG/Telemetry   []  Cardiology/Vascular   []  Pathology  []  Old records  []  Other:  Most notable findings include:     CBC:      Lab 10/03/23  0524   WBC 68.00*   HEMOGLOBIN 7.8*   HEMATOCRIT 24.8*   PLATELETS 58*   NEUTROS ABS 12.92*   .3*     CMP:        Lab 10/03/23  0524   SODIUM 142   POTASSIUM 4.5   CHLORIDE 108*   CO2 24.0   ANION GAP 10.0   BUN 37*   CREATININE 1.65*   EGFR 30.1*   GLUCOSE 110*   CALCIUM 9.0   MAGNESIUM 2.4   TOTAL PROTEIN 6.3   ALBUMIN 4.0   GLOBULIN 2.3   ALT (SGPT) 13   AST (SGOT) 25   BILIRUBIN 0.3   ALK PHOS 66       Assessment & Plan        Active Hospital Problems:  Active Hospital Problems    Diagnosis     **Dyspnea     Dyslipidemia     Thrombocytopenia     Essential hypertension     Coronary artery disease involving native coronary artery of native heart with unstable angina pectoris     Shortness of breath      ---Home medications not verified by pharmacy at the time of my exam----    Plan:   Dyspnea  Tachycardia  Dizziness  Essential hypertension  CAD status post percutaneous coronary angioplasty  History of MI    proBNP 2457  High-sensitivity troponin 18, continue to trend  EKG report states sinus or ectopic atrial tachycardia, atrial premature complexes, prolonged VA  interval  Chest x-ray radiology report states no active disease  Continue home Lipitor once verified by pharmacy  Patient on Cardizem drip, hold home BP medications once verified  /53  Monitor BP  Patient currently stable on room air  Echo from 5/9/2022 reviewed, EF of 60 to 65%  Echo ordered for further evaluation of dyspnea  Telemetry ordered  Cardiology consulted    Leukocytosis  Anemia  Thrombocytopenia  Hemoglobin 7.8, previously 9.9 on 9/24/2022  Hematocrit 24.8, previously 29.8 on 9/24/2022  Dyspnea may be secondary to anemia versus CHF  WBC 68  Urinalysis ordered  Blood cultures pending  Iron profile, B12, folate ordered  Continue home iron supplements once verified by pharmacy  Continue to monitor hemoglobin  SCDs ordered for DVT prophylaxis  Hematology oncology consult    Melena  Possible GI bleed  IV Protonix ordered  Fecal occult blood test ordered  Continue to monitor hemoglobin every 8 hours for one day  We will keep patient n.p.o. pending GI evaluation  GI consulted    Elevated TSH  TSH 6.01  Free T4 ordered    DVT prophylaxis:  Mechanical DVT prophylaxis orders are present.    CODE STATUS:    Level Of Support Discussed With: Patient  Code Status (Patient has no pulse and is not breathing): CPR (Attempt to Resuscitate)  Medical Interventions (Patient has pulse or is breathing): Full Support    Admission Status:  I believe this patient meets observation status.    I discussed the patient's findings and my recommendations with patient, family, and attending physician Dr. Villagomez .    This patient has been examined wearing appropriate Personal Protective Equipment and discussed with  physician Dr. Villagomez . 10/03/23      Signature: Electronically signed by Marquita Pinedo PA-C, 10/03/23, 08:47 EDT.  [facility] Hospitalist Team

## 2023-10-03 NOTE — Clinical Note
Level of Care: Med/Surg [1]   Admitting Physician: MANDA SCHWARZ [[id number]]   Attending Physician: MANDA SCHWARZ [[id number]]   Bed Request Comments: u

## 2023-10-04 ENCOUNTER — APPOINTMENT (OUTPATIENT)
Dept: ULTRASOUND IMAGING | Facility: HOSPITAL | Age: 88
DRG: 291 | End: 2023-10-04
Payer: MEDICARE

## 2023-10-04 ENCOUNTER — APPOINTMENT (OUTPATIENT)
Dept: CARDIOLOGY | Facility: HOSPITAL | Age: 88
DRG: 291 | End: 2023-10-04
Payer: MEDICARE

## 2023-10-04 ENCOUNTER — INPATIENT HOSPITAL (OUTPATIENT)
Dept: URBAN - METROPOLITAN AREA HOSPITAL 84 | Facility: HOSPITAL | Age: 88
End: 2023-10-04
Payer: MEDICAID

## 2023-10-04 DIAGNOSIS — D72.829 ELEVATED WHITE BLOOD CELL COUNT, UNSPECIFIED: ICD-10-CM

## 2023-10-04 DIAGNOSIS — D53.9 NUTRITIONAL ANEMIA, UNSPECIFIED: ICD-10-CM

## 2023-10-04 DIAGNOSIS — D69.6 THROMBOCYTOPENIA, UNSPECIFIED: ICD-10-CM

## 2023-10-04 LAB
ALBUMIN SERPL-MCNC: 3.9 G/DL (ref 3.5–5.2)
ALBUMIN/GLOB SERPL: 1.6 G/DL
ALP SERPL-CCNC: 71 U/L (ref 39–117)
ALT SERPL W P-5'-P-CCNC: 10 U/L (ref 1–33)
ANION GAP SERPL CALCULATED.3IONS-SCNC: 12 MMOL/L (ref 5–15)
ANISOCYTOSIS BLD QL: ABNORMAL
AST SERPL-CCNC: 22 U/L (ref 1–32)
BILIRUB SERPL-MCNC: 0.4 MG/DL (ref 0–1.2)
BLASTS NFR BLD MANUAL: 41 % (ref 0–0)
BUN SERPL-MCNC: 36 MG/DL (ref 8–23)
BUN/CREAT SERPL: 21.6 (ref 7–25)
CALCIUM SPEC-SCNC: 9.3 MG/DL (ref 8.6–10.5)
CHLORIDE SERPL-SCNC: 106 MMOL/L (ref 98–107)
CO2 SERPL-SCNC: 23 MMOL/L (ref 22–29)
CREAT SERPL-MCNC: 1.67 MG/DL (ref 0.57–1)
DACRYOCYTES BLD QL SMEAR: ABNORMAL
DEPRECATED RDW RBC AUTO: 85.8 FL (ref 37–54)
EGFRCR SERPLBLD CKD-EPI 2021: 29.7 ML/MIN/1.73
EOSINOPHIL # BLD MANUAL: 0.83 10*3/MM3 (ref 0–0.4)
EOSINOPHIL NFR BLD MANUAL: 1 % (ref 0.3–6.2)
ERYTHROCYTE [DISTWIDTH] IN BLOOD BY AUTOMATED COUNT: 21.8 % (ref 12.3–15.4)
GLOBULIN UR ELPH-MCNC: 2.5 GM/DL
GLUCOSE SERPL-MCNC: 114 MG/DL (ref 65–99)
HCT VFR BLD AUTO: 24.3 % (ref 34–46.6)
HGB BLD-MCNC: 7.5 G/DL (ref 12–15.9)
LDH SERPL-CCNC: 863 U/L (ref 135–214)
LYMPHOCYTES # BLD MANUAL: 8.3 10*3/MM3 (ref 0.7–3.1)
LYMPHOCYTES NFR BLD MANUAL: 20 % (ref 5–12)
MAGNESIUM SERPL-MCNC: 2.3 MG/DL (ref 1.6–2.4)
MCH RBC QN AUTO: 32.7 PG (ref 26.6–33)
MCHC RBC AUTO-ENTMCNC: 30.7 G/DL (ref 31.5–35.7)
MCV RBC AUTO: 106.6 FL (ref 79–97)
METAMYELOCYTES NFR BLD MANUAL: 3 % (ref 0–0)
MONOCYTES # BLD: 16.6 10*3/MM3 (ref 0.1–0.9)
MYELOCYTES NFR BLD MANUAL: 3 % (ref 0–0)
NEUTROPHILS # BLD AUTO: 14.94 10*3/MM3 (ref 1.7–7)
NEUTROPHILS NFR BLD MANUAL: 12 % (ref 42.7–76)
NEUTS BAND NFR BLD MANUAL: 6 % (ref 0–5)
PHOSPHATE SERPL-MCNC: 4.2 MG/DL (ref 2.5–4.5)
PLAT MORPH BLD: NORMAL
PLATELET # BLD AUTO: 65 10*3/MM3 (ref 140–450)
PMV BLD AUTO: 10.3 FL (ref 6–12)
POIKILOCYTOSIS BLD QL SMEAR: ABNORMAL
POTASSIUM SERPL-SCNC: 4.3 MMOL/L (ref 3.5–5.2)
PROLYMPHOCYTES NFR BLD MANUAL: 4 % (ref 0–0)
PROT SERPL-MCNC: 6.4 G/DL (ref 6–8.5)
QT INTERVAL: 315 MS
QTC INTERVAL: 424 MS
RBC # BLD AUTO: 2.28 10*6/MM3 (ref 3.77–5.28)
SCAN SLIDE: NORMAL
SODIUM SERPL-SCNC: 141 MMOL/L (ref 136–145)
T4 FREE SERPL-MCNC: 1.14 NG/DL (ref 0.93–1.7)
URATE SERPL-MCNC: 14.4 MG/DL (ref 2.4–5.7)
VARIANT LYMPHS NFR BLD MANUAL: 10 % (ref 19.6–45.3)
WBC MORPH BLD: NORMAL
WBC NRBC COR # BLD: 83 10*3/MM3 (ref 3.4–10.8)

## 2023-10-04 PROCEDURE — 76775 US EXAM ABDO BACK WALL LIM: CPT

## 2023-10-04 PROCEDURE — 97161 PT EVAL LOW COMPLEX 20 MIN: CPT

## 2023-10-04 PROCEDURE — 85007 BL SMEAR W/DIFF WBC COUNT: CPT | Performed by: INTERNAL MEDICINE

## 2023-10-04 PROCEDURE — 99232 SBSQ HOSP IP/OBS MODERATE 35: CPT | Performed by: INTERNAL MEDICINE

## 2023-10-04 PROCEDURE — 36415 COLL VENOUS BLD VENIPUNCTURE: CPT | Performed by: INTERNAL MEDICINE

## 2023-10-04 PROCEDURE — 84439 ASSAY OF FREE THYROXINE: CPT

## 2023-10-04 PROCEDURE — 83615 LACTATE (LD) (LDH) ENZYME: CPT | Performed by: INTERNAL MEDICINE

## 2023-10-04 PROCEDURE — 84550 ASSAY OF BLOOD/URIC ACID: CPT | Performed by: INTERNAL MEDICINE

## 2023-10-04 PROCEDURE — 84100 ASSAY OF PHOSPHORUS: CPT | Performed by: INTERNAL MEDICINE

## 2023-10-04 PROCEDURE — 93306 TTE W/DOPPLER COMPLETE: CPT | Performed by: INTERNAL MEDICINE

## 2023-10-04 PROCEDURE — 93306 TTE W/DOPPLER COMPLETE: CPT

## 2023-10-04 PROCEDURE — 80053 COMPREHEN METABOLIC PANEL: CPT | Performed by: INTERNAL MEDICINE

## 2023-10-04 PROCEDURE — 85025 COMPLETE CBC W/AUTO DIFF WBC: CPT | Performed by: INTERNAL MEDICINE

## 2023-10-04 PROCEDURE — 83735 ASSAY OF MAGNESIUM: CPT | Performed by: INTERNAL MEDICINE

## 2023-10-04 PROCEDURE — 99232 SBSQ HOSP IP/OBS MODERATE 35: CPT

## 2023-10-04 RX ORDER — CHOLESTYRAMINE LIGHT 4 G/5.7G
1 POWDER, FOR SUSPENSION ORAL EVERY 12 HOURS SCHEDULED
Status: DISCONTINUED | OUTPATIENT
Start: 2023-10-04 | End: 2023-10-04

## 2023-10-04 RX ORDER — FERROUS SULFATE TAB EC 324 MG (65 MG FE EQUIVALENT) 324 (65 FE) MG
324 TABLET DELAYED RESPONSE ORAL
Status: DISCONTINUED | OUTPATIENT
Start: 2023-10-05 | End: 2023-10-09 | Stop reason: HOSPADM

## 2023-10-04 RX ORDER — ALLOPURINOL 100 MG/1
100 TABLET ORAL DAILY
Status: DISCONTINUED | OUTPATIENT
Start: 2023-10-04 | End: 2023-10-05

## 2023-10-04 RX ORDER — CARVEDILOL 6.25 MG/1
12.5 TABLET ORAL 2 TIMES DAILY WITH MEALS
Status: DISCONTINUED | OUTPATIENT
Start: 2023-10-04 | End: 2023-10-09 | Stop reason: HOSPADM

## 2023-10-04 RX ORDER — CHOLECALCIFEROL (VITAMIN D3) 125 MCG
5 CAPSULE ORAL NIGHTLY PRN
Status: DISCONTINUED | OUTPATIENT
Start: 2023-10-04 | End: 2023-10-05

## 2023-10-04 RX ORDER — NITROGLYCERIN 0.4 MG/1
0.4 TABLET SUBLINGUAL
Status: DISCONTINUED | OUTPATIENT
Start: 2023-10-04 | End: 2023-10-09 | Stop reason: HOSPADM

## 2023-10-04 RX ORDER — CETIRIZINE HYDROCHLORIDE 10 MG/1
10 TABLET ORAL DAILY
Status: DISCONTINUED | OUTPATIENT
Start: 2023-10-04 | End: 2023-10-09 | Stop reason: HOSPADM

## 2023-10-04 RX ORDER — HYDROXYUREA 500 MG/1
500 CAPSULE ORAL 2 TIMES DAILY
Status: DISCONTINUED | OUTPATIENT
Start: 2023-10-04 | End: 2023-10-05

## 2023-10-04 RX ORDER — PANTOPRAZOLE SODIUM 40 MG/1
40 TABLET, DELAYED RELEASE ORAL DAILY
Status: DISCONTINUED | OUTPATIENT
Start: 2023-10-04 | End: 2023-10-09 | Stop reason: HOSPADM

## 2023-10-04 RX ORDER — ATORVASTATIN CALCIUM 20 MG/1
20 TABLET, FILM COATED ORAL DAILY
Status: DISCONTINUED | OUTPATIENT
Start: 2023-10-04 | End: 2023-10-09 | Stop reason: HOSPADM

## 2023-10-04 RX ADMIN — CARVEDILOL 12.5 MG: 6.25 TABLET, FILM COATED ORAL at 16:48

## 2023-10-04 RX ADMIN — Medication 10 ML: at 09:56

## 2023-10-04 RX ADMIN — ALLOPURINOL 100 MG: 100 TABLET ORAL at 09:57

## 2023-10-04 RX ADMIN — Medication 7.5 MG/HR: at 22:54

## 2023-10-04 RX ADMIN — Medication 5 MG: at 22:54

## 2023-10-04 RX ADMIN — HYDROXYUREA 500 MG: 500 CAPSULE ORAL at 20:41

## 2023-10-04 RX ADMIN — Medication 10 ML: at 20:42

## 2023-10-04 RX ADMIN — ATORVASTATIN CALCIUM 20 MG: 20 TABLET, FILM COATED ORAL at 16:48

## 2023-10-04 RX ADMIN — PANTOPRAZOLE SODIUM 40 MG: 40 TABLET, DELAYED RELEASE ORAL at 16:48

## 2023-10-04 RX ADMIN — CETIRIZINE HYDROCHLORIDE 10 MG: 10 TABLET, FILM COATED ORAL at 16:48

## 2023-10-04 RX ADMIN — HYDROXYUREA 500 MG: 500 CAPSULE ORAL at 09:54

## 2023-10-04 NOTE — CONSULTS
Nephrology Consult Note                                                [facility]      Patient Identification:  Name: Tricia Marroquin  Age: 88 y.o.  Sex: female  :  1935  MRN: [id number]               Requesting Physician: Amy Villagomez MD  Reason for Consultation: management recommendations      History of Present Illness:    Patient is an 88-year-old white female patient without previous kidney disease with a history of coronary artery disease hypertension dyslipidemia presented to the hospital with tachycardia and was found to be in acute kidney injury with elevated creatinine 1.6 with electrolyte disturbances prompting renal consultation  Problem List:    Dyspnea    Shortness of breath    Coronary artery disease involving native coronary artery of native heart with unstable angina pectoris    Essential hypertension    Dyslipidemia    Thrombocytopenia    Dizziness    History of MI (myocardial infarction)    Leukocytosis    Symptomatic anemia     Past Medical History:  Past Medical History:   Diagnosis Date    Hypertension     Seasonal allergies      Past Surgical History:  Past Surgical History:   Procedure Laterality Date    AORTA SURGERY      BACK SURGERY      CARDIAC CATHETERIZATION N/A 2022    Procedure: Left Heart Cath;  Surgeon: Rodriguez Glass MD;  Location: [facility] CATH INVASIVE LOCATION;  Service: Cardiovascular;  Laterality: N/A;    CORONARY ANGIOPLASTY WITH STENT PLACEMENT  2022      Home Meds:  Medications Prior to Admission   Medication Sig Dispense Refill Last Dose    aspirin 81 MG chewable tablet Chew 1 tablet Daily.       atorvastatin (LIPITOR) 20 MG tablet Take 1 tablet by mouth Daily. 90 tablet 3     candesartan-hydrochlorothiazide (Atacand HCT) 32-12.5 MG per tablet Take 1 tablet by mouth Daily. 30 tablet 11     carvedilol (COREG) 12.5 MG tablet Take 1 tablet by mouth 2 (Two) Times a Day With Meals. 180 tablet 1     clopidogrel (PLAVIX) 75  MG tablet Take 1 tablet by mouth Daily.       ferrous sulfate 325 (65 FE) MG tablet Take 1 tablet by mouth Daily With Breakfast.       fluticasone (FLONASE) 50 MCG/ACT nasal spray 2 sprays into the nostril(s) as directed by provider Daily.       loratadine (CLARITIN) 10 MG tablet Take 1 tablet by mouth Daily.       Multiple Vitamins-Minerals (Viteyes AREDS Formula) capsule Take 1 capsule by mouth Daily.       nitroglycerin (NITROSTAT) 0.4 MG SL tablet Place 1 tablet under the tongue Every 5 (Five) Minutes As Needed for Chest Pain. Take no more than 3 doses in 15 minutes.       pantoprazole (PROTONIX) 40 MG EC tablet Take 1 tablet by mouth Daily.       spironolactone (ALDACTONE) 25 MG tablet TAKE 1 TABLET BY MOUTH EVERY DAY 90 tablet 2      Current Meds:     Current Facility-Administered Medications:     acetaminophen (TYLENOL) tablet 650 mg, 650 mg, Oral, Q4H PRN, Marquita Pinedo PA-C, 650 mg at 10/03/23 1733    allopurinol (ZYLOPRIM) tablet 100 mg, 100 mg, Oral, Daily, Lynn Gutierrez MD, 100 mg at 10/04/23 0957    sennosides-docusate (PERICOLACE) 8.6-50 MG per tablet 2 tablet, 2 tablet, Oral, BID, 2 tablet at 10/03/23 2009 **AND** polyethylene glycol (MIRALAX) packet 17 g, 17 g, Oral, Daily PRN **AND** bisacodyl (DULCOLAX) EC tablet 5 mg, 5 mg, Oral, Daily PRN **AND** bisacodyl (DULCOLAX) suppository 10 mg, 10 mg, Rectal, Daily PRN, Marquita Pinedo PA-C    dilTIAZem (CARDIZEM) 125 mg in 125 mL D5W infusion, 5-15 mg/hr, Intravenous, Titrated, Constantin Griffin DO, Last Rate: 5 mL/hr at 10/04/23 0504, 5 mg/hr at 10/04/23 0504    hydroxyurea (HYDREA) capsule 500 mg, 500 mg, Oral, BID, Lynn Gutierrez MD, 500 mg at 10/04/23 0954    nitroglycerin (NITROSTAT) SL tablet 0.4 mg, 0.4 mg, Sublingual, Q5 Min PRN, Marquita Pinedo PA-C    [COMPLETED] Insert Peripheral IV, , , Once **AND** sodium chloride 0.9 % flush 10 mL, 10 mL, Intravenous, PRN, Constantin Griffin,     sodium chloride 0.9 % flush  "10 mL, 10 mL, Intravenous, Q12H, Marquita Pinedo PA-C, 10 mL at 10/04/23 0956    sodium chloride 0.9 % flush 10 mL, 10 mL, Intravenous, PRN, Marquita Pinedo PA-C    sodium chloride 0.9 % infusion 40 mL, 40 mL, Intravenous, PRN, Marquita Pinedo PA-C    Allergies:  Allergies   Allergen Reactions    Aspirin Other (See Comments)     Pt reports \"makes my heart go crazy\"    Codeine GI Intolerance    Penicillins Hives    Lisinopril Cough     Social History:   Social History     Tobacco Use    Smoking status: Former     Packs/day: 2.00     Years: 40.00     Pack years: 80.00     Types: Cigarettes     Start date:      Quit date:      Years since quittin.7    Smokeless tobacco: Never   Substance Use Topics    Alcohol use: Never      Family History:  Family History   Problem Relation Age of Onset    Heart disease Mother     Heart disease Father     Heart attack Father     Lung cancer Brother         associated to intense cigarette smoking        Review of Systems  Reviewed 12 systems were reviewed, all negative except for those mentioned in HPI    Objective:  Vitals:   /41 (BP Location: Left arm, Patient Position: Lying)   Pulse 74   Temp 97.4 øF (36.3 øC) (Tympanic)   Resp 17   Ht 154.9 cm (61\")   Wt 78 kg (172 lb)   SpO2 95%   BMI 32.50 kg/mý   I/O:     Intake/Output Summary (Last 24 hours) at 10/4/2023 1143  Last data filed at 10/4/2023 1000  Gross per 24 hour   Intake 720 ml   Output 600 ml   Net 120 ml       Exam:  General Appearance:  Alert  Head:  Normocephalic, without obvious abnormality, atraumatic  Eyes:  PERRL, conjunctiva/corneas clear     Neck:  Supple,  no adenopathy;      Lungs:  Decreased BS occasion ronchi  Heart:  Regular rate and rhythm, S1 and S2 normal  Abdomen:  Soft, non-tender, bowel sounds active   Extremities: trace edema  Pulses: 2+ and symmetric all extremities  Skin:  No rashes or lesions  Data Review:  All labs (24hrs):   Recent Results (from the past 24 hour(s))   ECG " 12 Lead Rhythm Change    Collection Time: 10/03/23  1:50 PM   Result Value Ref Range    QT Interval 362 ms    QTC Interval 426 ms   Hemoglobin    Collection Time: 10/03/23  5:21 PM    Specimen: Blood   Result Value Ref Range    Hemoglobin 7.3 (L) 12.0 - 15.9 g/dL   Urinalysis With Microscopic If Indicated (No Culture) - Urine, Clean Catch    Collection Time: 10/03/23  8:03 PM    Specimen: Urine, Clean Catch   Result Value Ref Range    Color, UA Yellow Yellow, Straw    Appearance, UA Clear Clear    pH, UA 7.5 5.0 - 8.0    Specific Gravity, UA 1.009 1.005 - 1.030    Glucose, UA Negative Negative    Ketones, UA Negative Negative    Bilirubin, UA Negative Negative    Blood, UA Negative Negative    Protein, UA Negative Negative    Leuk Esterase, UA Negative Negative    Nitrite, UA Negative Negative    Urobilinogen, UA 0.2 E.U./dL 0.2 - 1.0 E.U./dL   Comprehensive Metabolic Panel    Collection Time: 10/03/23  8:41 PM    Specimen: Blood   Result Value Ref Range    Glucose 142 (H) 65 - 99 mg/dL    BUN 38 (H) 8 - 23 mg/dL    Creatinine 1.98 (H) 0.57 - 1.00 mg/dL    Sodium 139 136 - 145 mmol/L    Potassium 4.4 3.5 - 5.2 mmol/L    Chloride 105 98 - 107 mmol/L    CO2 23.0 22.0 - 29.0 mmol/L    Calcium 8.8 8.6 - 10.5 mg/dL    Total Protein 6.2 6.0 - 8.5 g/dL    Albumin 3.8 3.5 - 5.2 g/dL    ALT (SGPT) 14 1 - 33 U/L    AST (SGOT) 20 1 - 32 U/L    Alkaline Phosphatase 64 39 - 117 U/L    Total Bilirubin 0.3 0.0 - 1.2 mg/dL    Globulin 2.4 gm/dL    A/G Ratio 1.6 g/dL    BUN/Creatinine Ratio 19.2 7.0 - 25.0    Anion Gap 11.0 5.0 - 15.0 mmol/L    eGFR 24.2 (L) >60.0 mL/min/1.73   Uric Acid    Collection Time: 10/03/23  8:41 PM    Specimen: Blood   Result Value Ref Range    Uric Acid 15.0 (H) 2.4 - 5.7 mg/dL   Lactate Dehydrogenase    Collection Time: 10/03/23  8:41 PM    Specimen: Blood   Result Value Ref Range     (H) 135 - 214 U/L   Phosphorus    Collection Time: 10/03/23  8:41 PM    Specimen: Blood   Result Value Ref Range     Phosphorus 4.1 2.5 - 4.5 mg/dL   Magnesium    Collection Time: 10/03/23  8:41 PM    Specimen: Blood   Result Value Ref Range    Magnesium 2.8 (H) 1.6 - 2.4 mg/dL   Fibrinogen    Collection Time: 10/03/23  8:41 PM    Specimen: Blood   Result Value Ref Range    Fibrinogen 329 210 - 450 mg/dL   CBC Auto Differential    Collection Time: 10/03/23  8:41 PM    Specimen: Blood   Result Value Ref Range    WBC 69.80 (C) 3.40 - 10.80 10*3/mm3    RBC 2.19 (L) 3.77 - 5.28 10*6/mm3    Hemoglobin 7.3 (L) 12.0 - 15.9 g/dL    Hematocrit 23.0 (L) 34.0 - 46.6 %    .3 (H) 79.0 - 97.0 fL    MCH 33.5 (H) 26.6 - 33.0 pg    MCHC 31.8 31.5 - 35.7 g/dL    RDW 21.6 (H) 12.3 - 15.4 %    RDW-SD 84.0 (H) 37.0 - 54.0 fl    MPV 10.6 6.0 - 12.0 fL    Platelets 62 (L) 140 - 450 10*3/mm3   Scan Slide    Collection Time: 10/03/23  8:41 PM    Specimen: Blood   Result Value Ref Range    Scan Slide     Manual Differential    Collection Time: 10/03/23  8:41 PM    Specimen: Blood   Result Value Ref Range    Neutrophil % 12.0 (L) 42.7 - 76.0 %    Lymphocyte % 15.0 (L) 19.6 - 45.3 %    Monocyte % 21.0 (H) 5.0 - 12.0 %    Metamyelocyte % 4.0 (H) 0.0 - 0.0 %    Blasts % 48.0 (H) 0.0 - 0.0 %    Neutrophils Absolute 8.38 (H) 1.70 - 7.00 10*3/mm3    Lymphocytes Absolute 10.47 (H) 0.70 - 3.10 10*3/mm3    Monocytes Absolute 14.66 (H) 0.10 - 0.90 10*3/mm3    nRBC 1.0 (H) 0.0 - 0.2 /100 WBC    Anisocytosis Slight/1+ None Seen    Dacrocytes Slight/1+ None Seen    Macrocytes Slight/1+ None Seen    Ovalocytes Slight/1+ None Seen    Poikilocytes Slight/1+ None Seen    Polychromasia Slight/1+ None Seen    WBC Morphology Normal Normal    Large Platelets Slight/1+ None Seen   T4, Free    Collection Time: 10/04/23  8:13 AM    Specimen: Blood   Result Value Ref Range    Free T4 1.14 0.93 - 1.70 ng/dL   Comprehensive Metabolic Panel    Collection Time: 10/04/23  8:13 AM    Specimen: Blood   Result Value Ref Range    Glucose 114 (H) 65 - 99 mg/dL    BUN 36 (H) 8 - 23  mg/dL    Creatinine 1.67 (H) 0.57 - 1.00 mg/dL    Sodium 141 136 - 145 mmol/L    Potassium 4.3 3.5 - 5.2 mmol/L    Chloride 106 98 - 107 mmol/L    CO2 23.0 22.0 - 29.0 mmol/L    Calcium 9.3 8.6 - 10.5 mg/dL    Total Protein 6.4 6.0 - 8.5 g/dL    Albumin 3.9 3.5 - 5.2 g/dL    ALT (SGPT) 10 1 - 33 U/L    AST (SGOT) 22 1 - 32 U/L    Alkaline Phosphatase 71 39 - 117 U/L    Total Bilirubin 0.4 0.0 - 1.2 mg/dL    Globulin 2.5 gm/dL    A/G Ratio 1.6 g/dL    BUN/Creatinine Ratio 21.6 7.0 - 25.0    Anion Gap 12.0 5.0 - 15.0 mmol/L    eGFR 29.7 (L) >60.0 mL/min/1.73   Uric Acid    Collection Time: 10/04/23  8:13 AM    Specimen: Blood   Result Value Ref Range    Uric Acid 14.4 (H) 2.4 - 5.7 mg/dL   Lactate Dehydrogenase    Collection Time: 10/04/23  8:13 AM    Specimen: Blood   Result Value Ref Range     (H) 135 - 214 U/L   Phosphorus    Collection Time: 10/04/23  8:13 AM    Specimen: Blood   Result Value Ref Range    Phosphorus 4.2 2.5 - 4.5 mg/dL   Magnesium    Collection Time: 10/04/23  8:13 AM    Specimen: Blood   Result Value Ref Range    Magnesium 2.3 1.6 - 2.4 mg/dL   CBC Auto Differential    Collection Time: 10/04/23  8:14 AM    Specimen: Blood   Result Value Ref Range    WBC 83.00 (C) 3.40 - 10.80 10*3/mm3    RBC 2.28 (L) 3.77 - 5.28 10*6/mm3    Hemoglobin 7.5 (L) 12.0 - 15.9 g/dL    Hematocrit 24.3 (L) 34.0 - 46.6 %    .6 (H) 79.0 - 97.0 fL    MCH 32.7 26.6 - 33.0 pg    MCHC 30.7 (L) 31.5 - 35.7 g/dL    RDW 21.8 (H) 12.3 - 15.4 %    RDW-SD 85.8 (H) 37.0 - 54.0 fl    MPV 10.3 6.0 - 12.0 fL    Platelets 65 (L) 140 - 450 10*3/mm3   Scan Slide    Collection Time: 10/04/23  8:14 AM    Specimen: Blood   Result Value Ref Range    Scan Slide     Manual Differential    Collection Time: 10/04/23  8:14 AM    Specimen: Blood   Result Value Ref Range    Neutrophil % 12.0 (L) 42.7 - 76.0 %    Lymphocyte % 10.0 (L) 19.6 - 45.3 %    Monocyte % 20.0 (H) 5.0 - 12.0 %    Eosinophil % 1.0 0.3 - 6.2 %    Bands %  6.0 (H)  0.0 - 5.0 %    Metamyelocyte % 3.0 (H) 0.0 - 0.0 %    Myelocyte % 3.0 (H) 0.0 - 0.0 %    Blasts % 41.0 (H) 0.0 - 0.0 %    Prolymphocyte % 4.0 (H) 0.0 - 0.0 %    Neutrophils Absolute 14.94 (H) 1.70 - 7.00 10*3/mm3    Lymphocytes Absolute 8.30 (H) 0.70 - 3.10 10*3/mm3    Monocytes Absolute 16.60 (H) 0.10 - 0.90 10*3/mm3    Eosinophils Absolute 0.83 (H) 0.00 - 0.40 10*3/mm3    Anisocytosis Slight/1+ None Seen    Dacrocytes Slight/1+ None Seen    Poikilocytes Slight/1+ None Seen    WBC Morphology Normal Normal    Platelet Morphology Normal Normal       Current Facility-Administered Medications:     acetaminophen (TYLENOL) tablet 650 mg, 650 mg, Oral, Q4H PRN, Marquita Pinedo PA-C, 650 mg at 10/03/23 1733    allopurinol (ZYLOPRIM) tablet 100 mg, 100 mg, Oral, Daily, Lynn Gutierrez MD, 100 mg at 10/04/23 0957    sennosides-docusate (PERICOLACE) 8.6-50 MG per tablet 2 tablet, 2 tablet, Oral, BID, 2 tablet at 10/03/23 2009 **AND** polyethylene glycol (MIRALAX) packet 17 g, 17 g, Oral, Daily PRN **AND** bisacodyl (DULCOLAX) EC tablet 5 mg, 5 mg, Oral, Daily PRN **AND** bisacodyl (DULCOLAX) suppository 10 mg, 10 mg, Rectal, Daily PRN, Marquita Pinedo PA-C    dilTIAZem (CARDIZEM) 125 mg in 125 mL D5W infusion, 5-15 mg/hr, Intravenous, Titrated, Constantin Griffin DO, Last Rate: 5 mL/hr at 10/04/23 0504, 5 mg/hr at 10/04/23 0504    hydroxyurea (HYDREA) capsule 500 mg, 500 mg, Oral, BID, Lynn Gutierrez MD, 500 mg at 10/04/23 0954    nitroglycerin (NITROSTAT) SL tablet 0.4 mg, 0.4 mg, Sublingual, Q5 Min PRN, Marquita Pinedo PA-C    [COMPLETED] Insert Peripheral IV, , , Once **AND** sodium chloride 0.9 % flush 10 mL, 10 mL, Intravenous, PRN, Constantin Griffin,     sodium chloride 0.9 % flush 10 mL, 10 mL, Intravenous, Q12H, Marquita Pinedo PA-C, 10 mL at 10/04/23 0956    sodium chloride 0.9 % flush 10 mL, 10 mL, Intravenous, PRN, Marquita Pinedo PA-C    sodium chloride 0.9 % infusion 40 mL, 40 mL,  Intravenous, PRN, Pinedo, Marquita FALCON PA-C    Assessment:  Acute kidney injury likely secondary to prerenal component  Check arrhythmias  Hypertension  Coronary artery disease  Leukocytosis  Possible GI bleed  Left renal cyst    Recommendations:  Avoid nephrotoxic medication  Noted renal ultrasound  Gentle hydration  Control heart rate  Correct electrolyte  Follow repeat labs      Amy Bowen MD  10/4/2023  11:43 EDT

## 2023-10-04 NOTE — PROGRESS NOTES
LOS: 0 days   Patient Care Team:  Danish Herbert MD as PCP - General (Family Medicine)  Rodriguez Glass MD as Consulting Physician (Cardiology)  Tessie Dutta APRN as Nurse Practitioner (Cardiology)      Subjective     Interval History:     Subjective: Undergoing echocardiogram during exam this morning.  No abdominal pain, nausea, vomiting.  No GI blood loss noted.      ROS:   No chest pain, shortness of breath, or cough.        Medication Review:     Current Facility-Administered Medications:     acetaminophen (TYLENOL) tablet 650 mg, 650 mg, Oral, Q4H PRN, Marquita Pinedo PA-C, 650 mg at 10/03/23 1733    allopurinol (ZYLOPRIM) tablet 100 mg, 100 mg, Oral, Daily, Lynn Gutierrez MD, 100 mg at 10/04/23 0957    sennosides-docusate (PERICOLACE) 8.6-50 MG per tablet 2 tablet, 2 tablet, Oral, BID, 2 tablet at 10/03/23 2009 **AND** polyethylene glycol (MIRALAX) packet 17 g, 17 g, Oral, Daily PRN **AND** bisacodyl (DULCOLAX) EC tablet 5 mg, 5 mg, Oral, Daily PRN **AND** bisacodyl (DULCOLAX) suppository 10 mg, 10 mg, Rectal, Daily PRN, Marquita Pinedo PA-C    dilTIAZem (CARDIZEM) 125 mg in 125 mL D5W infusion, 5-15 mg/hr, Intravenous, Titrated, Constantin Griffin DO, Last Rate: 5 mL/hr at 10/04/23 0504, 5 mg/hr at 10/04/23 0504    hydroxyurea (HYDREA) capsule 500 mg, 500 mg, Oral, BID, Lynn Gutierrez MD, 500 mg at 10/04/23 0954    nitroglycerin (NITROSTAT) SL tablet 0.4 mg, 0.4 mg, Sublingual, Q5 Min PRN, Marquita Pinedo PA-C    [COMPLETED] Insert Peripheral IV, , , Once **AND** sodium chloride 0.9 % flush 10 mL, 10 mL, Intravenous, PRN, Constantin Griffin DO    sodium chloride 0.9 % flush 10 mL, 10 mL, Intravenous, Q12H, Marquita Pinedo PA-C, 10 mL at 10/04/23 0956    sodium chloride 0.9 % flush 10 mL, 10 mL, Intravenous, PRN, Marquita Pinedo PA-C    sodium chloride 0.9 % infusion 40 mL, 40 mL, Intravenous, PRN, Marquita Pinedo PA-C      Objective     Vital Signs  Vitals:  "   10/04/23 0630 10/04/23 0800 10/04/23 0818 10/04/23 1108   BP: 138/50 143/61 138/50 130/41   BP Location:    Left arm   Patient Position:    Lying   Pulse: 86 94  74   Resp:    17   Temp:    97.4 øF (36.3 øC)   TempSrc:    Tympanic   SpO2: 93% 95%  98%   Weight:   78 kg (172 lb)    Height:   154.9 cm (61\")        Physical Exam:     General Appearance:    Awake and alert, in no acute distress   Head:    Normocephalic, without obvious abnormality   Eyes:          Conjunctivae normal, anicteric sclera   Throat:   No oral lesions, no thrush, oral mucosa moist   Neck:   No adenopathy, supple, no JVD   Lungs:     respirations regular, even and unlabored   Abdomen:     Soft, non-tender, no rebound or guarding, non-distended, no hepatosplenomegaly   Rectal:     Deferred   Extremities:   No edema, no cyanosis   Skin:   No bruising or rash, no jaundice        Results Review:    CBC    Results from last 7 days   Lab Units 10/04/23  0814 10/03/23  2041 10/03/23  1721 10/03/23  0524   WBC 10*3/mm3 83.00* 69.80*  --  68.00*   HEMOGLOBIN g/dL 7.5* 7.3* 7.3* 7.8*   PLATELETS 10*3/mm3 65* 62*  --  58*     CMP   Results from last 7 days   Lab Units 10/04/23  0813 10/03/23  2041 10/03/23  0524   SODIUM mmol/L 141 139 142   POTASSIUM mmol/L 4.3 4.4 4.5   CHLORIDE mmol/L 106 105 108*   CO2 mmol/L 23.0 23.0 24.0   BUN mg/dL 36* 38* 37*   CREATININE mg/dL 1.67* 1.98* 1.65*   GLUCOSE mg/dL 114* 142* 110*   ALBUMIN g/dL 3.9 3.8 4.0   BILIRUBIN mg/dL 0.4 0.3 0.3   ALK PHOS U/L 71 64 66   AST (SGOT) U/L 22 20 25   ALT (SGPT) U/L 10 14 13   MAGNESIUM mg/dL 2.3 2.8* 2.4   PHOSPHORUS mg/dL 4.2 4.1  --      Cr Clearance Estimated Creatinine Clearance: 22.9 mL/min (A) (by C-G formula based on SCr of 1.67 mg/dL (H)).  Coag   Results from last 7 days   Lab Units 10/03/23  0524   INR  1.01   APTT seconds 26.7*     HbA1C   Lab Results   Component Value Date    HGBA1C 6.40 (H) 10/03/2023    HGBA1C 6.0 (H) 09/24/2022     Blood Glucose No results found " for: POCGLU  Infection   Results from last 7 days   Lab Units 10/03/23  0724 10/03/23  0700   BLOODCX  No growth at 24 hours No growth at 24 hours     UA    Results from last 7 days   Lab Units 10/03/23  2003   NITRITE UA  Negative     Radiology(recent) XR Chest 1 View    Result Date: 10/3/2023  Impression: No active disease Electronically Signed: Francis Shah MD  10/3/2023 5:42 AM EDT  Workstation ID: [id number]        Assessment & Plan   88-year-old female with history of CAD with stenting on Plavix and aspirin presented to the hospital with complaints of heart palpitations.  GI has been consulted for anemia.  Was noted to have dark stools, however, has been taking oral iron supplementation over the last month.     -Macrocytic anemia  -Thrombocytopenia   -Marked leukocytosis  -Heart palpitations, tachycardia  -Shortness of air  -CAD with stenting on Plavix and aspirin     Plan  No overt GI blood loss noted other than dark stools which is likely secondary to iron use.  Her hemoglobin is 7.5.  Continue to monitor H&H transfuse for hemoglobin less than 7.  Iron studies are normal.  With her significant leukocytosis + 22% blasts and thrombocytopenia, concern for malignancy.  Hematology/oncology has been consulted and is working patient up for AML. Pt is hesitant to have bone marrow bx .  No plans for endoscopic procedures. GI will sign off at this time, but please call if needed.       I discussed the patients findings and my recommendations with the patient.     We appreciate the referral       Electronically signed by [facility] Pinzon, 10/04/23, 11:28 AM EDT.

## 2023-10-04 NOTE — PLAN OF CARE
Goal Outcome Evaluation:  Plan of Care Reviewed With: patient        Progress: no change  Outcome Evaluation: Slept at intervals. No c/o discomfort. Cardizem drip continues. Up to BSC with Ax1. Possible bone marrow biopsy today. Will continue to monitor.

## 2023-10-04 NOTE — PROGRESS NOTES
[facility] Medicine Services   Daily Progress Note    Patient Name: Tricia Marroquin  : 1935  MRN: [id number]  Primary Care Physician:  Danish Herbert MD  Date of admission: 10/3/2023  Date and Time of Service: 10/4/23    Brief clinical summary:  88-year-old female with history of HTN, HLD, PAD, CAD with stenting on Plavix and aspirin presented to the hospital with complaints of palpitations and SOB. Cardio saw pt, questions afib but thinks abnormal rhythm is more consistent with sinus tach with intermittent second degree block (Wenckebach). She was also noted to have a dark stool, however, has been taking oral iron supplementation over the last month. So GI is not planning any acute interventions. Hemonc consulted for abnormal cytopenias (leucocytosis with circulating blast, anemia and thrombocytopenia)     Subjective      Met this pleasant lady resting in her room. She remains on Cardizem drip. Bedside monitor displays borderline afib. She feels comfortable. Denies chest pain, palpitation, or  SOB    Objective      Vitals:   Temp:  [97.3 øF (36.3 øC)-98.2 øF (36.8 øC)] 97.6 øF (36.4 øC)  Heart Rate:  [] 95  Resp:  [17-25] 21  BP: ()/() 136/49    Physical Exam   General: comfortable, normal interaction, not in distress  Mental status: Alert and oriented x3  Head: Normocephalic and atraumatic  Eyes: EOMI, nonicteric  CVS: Irregular borderline tachycardia, no gallops  Respiration: Nonlabored breathing, clear to auscultation  GI: Soft, nondistended, nontender, bowel sounds present  Extremity: No leg edema, no gross deformities  Neurology: Moves all extremities, no facial droop, normal speech  Psychiatry: Normal interaction, normal affect     Result Review    Result Review:  I have personally reviewed the results from the time of this admission to 10/4/2023 16:08 EDT and agree with these findings:  [x]  Laboratory  []  Microbiology  [x]  Radiology  [x]  EKG/Telemetry   [x]   Cardiology/Vascular   []  Pathology  []  Old records  []  Other:    Assessment & Plan      allopurinol, 100 mg, Oral, Daily  atorvastatin, 20 mg, Oral, Daily  carvedilol, 12.5 mg, Oral, BID With Meals  cetirizine, 10 mg, Oral, Daily  [START ON 10/5/2023] ferrous sulfate, 324 mg, Oral, Daily With Breakfast  hydroxyurea, 500 mg, Oral, BID  pantoprazole, 40 mg, Oral, Daily  senna-docusate sodium, 2 tablet, Oral, BID  sodium chloride, 10 mL, Intravenous, Q12H       dilTIAZem, 5-15 mg/hr, Last Rate: 5 mg/hr (10/04/23 1550)         Active Hospital Problems:  Active Hospital Problems    Diagnosis     **Dyspnea     Dizziness     History of MI (myocardial infarction)     Leukocytosis     Symptomatic anemia     Dyslipidemia     Thrombocytopenia     Essential hypertension     Coronary artery disease involving native coronary artery of native heart with unstable angina pectoris     Shortness of breath      Plan:   #Supraventricular tachycardia, suspected A-fib with RVR versus sinus tach with PACs and Wenckebach  -Cardiology, recommends continuation of Cardizem drip    #Leukocytosis with circulating blasts suspicious of myeloproliferative disease  #Anemia  #Thrombocytopenia  -Hematology consulted    #Acute kidney injury  -Serum creatinine 1.98 (previously <1 in 2020)  -Renal ultrasound ordered  -Nephrology consulted  -IV fluid initiated  -Hold home Atacand and Aldactone  -Monitor renal function    #CAD  -Continue atorvastatin, Coreg, Imdur  -Hold aspirin and Plavix due to risk of bleeding in the context of thrombocytopenia    DVT prophylaxis:  Mechanical DVT prophylaxis orders are present.    CODE STATUS:    Level Of Support Discussed With: Patient  Code Status (Patient has no pulse and is not breathing): CPR (Attempt to Resuscitate)  Medical Interventions (Patient has pulse or is breathing): Full Support      Disposition:  I expect patient to be discharged 2-3 days      Electronically signed by Monet Bonner MD, 10/04/23,  16:08 EDT.  [facility]

## 2023-10-04 NOTE — THERAPY EVALUATION
Patient Name: Tricia Marroquin  : 1935    MRN: [id number]                              Today's Date: 10/4/2023       Admit Date: 10/3/2023    Visit Dx:     ICD-10-CM ICD-9-CM   1. Dyspnea, unspecified type  R06.00 786.09   2. Leukocytosis, unspecified type  D72.829 288.60   3. Atrial fibrillation with RVR  I48.91 427.31     Patient Active Problem List   Diagnosis    Shortness of breath    Coronary artery disease involving native coronary artery of native heart with unstable angina pectoris    Essential hypertension    Dyslipidemia    PAD (peripheral artery disease)    Thrombocytopenia    Easy bruising    Non-STEMI (non-ST elevated myocardial infarction)    Chest pain, unspecified type    CAD S/P percutaneous coronary angioplasty    Dyspnea    Dizziness    History of MI (myocardial infarction)    Leukocytosis    Symptomatic anemia     Past Medical History:   Diagnosis Date    Hypertension     Seasonal allergies      Past Surgical History:   Procedure Laterality Date    AORTA SURGERY      BACK SURGERY      CARDIAC CATHETERIZATION N/A 2022    Procedure: Left Heart Cath;  Surgeon: Rodriguez Glass MD;  Location: [facility] CATH INVASIVE LOCATION;  Service: Cardiovascular;  Laterality: N/A;    CORONARY ANGIOPLASTY WITH STENT PLACEMENT  2022      General Information       Row Name 10/04/23 1424          Physical Therapy Time and Intention    Document Type evaluation  -     Mode of Treatment physical therapy  -       Row Name 10/04/23 1424          General Information    Patient Profile Reviewed yes  -     Prior Level of Function independent:;ADL's;all household mobility  -     Existing Precautions/Restrictions fall  -     Barriers to Rehab none identified  -       Row Name 10/04/23 1424          Living Environment    People in Home alone  -       Row Name 10/04/23 1424          Home Main Entrance    Number of Stairs, Main Entrance none  -       Row Name 10/04/23 1424          Stairs  Within Home, Primary    Number of Stairs, Within Home, Primary none  -       Row Name 10/04/23 1424          Cognition    Orientation Status (Cognition) oriented x 4  -       Row Name 10/04/23 1424          Safety Issues, Functional Mobility    Impairments Affecting Function (Mobility) endurance/activity tolerance;balance  -               User Key  (r) = Recorded By, (t) = Taken By, (c) = Cosigned By      Initials Name Provider Type     Lucia Fuentes, PT Physical Therapist                   Mobility       Row Name 10/04/23 1425          Bed Mobility    Bed Mobility bed mobility (all) activities  -     All Activities, [place] (Bed Mobility) independent  -       Row Name 10/04/23 1425          Sit-Stand Transfer    Sit-Stand [place] (Transfers) standby assist  -       Row Name 10/04/23 1425          Gait/Stairs (Locomotion)    [place] Level (Gait) contact guard  -     Distance in Feet (Gait) 100', holding to IV pole and handrail as needed.  would benefit from RW, uses rollator when outside of her apt  -               User Key  (r) = Recorded By, (t) = Taken By, (c) = Cosigned By      Initials Name Provider Type     Lucia Fuentes, PT Physical Therapist                   Obj/Interventions       Row Name 10/04/23 1426          Range of Motion Comprehensive    General Range of Motion no range of motion deficits identified  -       Row Name 10/04/23 1426          Strength Comprehensive (MMT)    General Manual Muscle Testing (MMT) Assessment no strength deficits identified  -       Row Name 10/04/23 1426          Balance    Balance Assessment sitting static balance;sitting dynamic balance;standing static balance;standing dynamic balance  -     Static Sitting Balance independent  -     Dynamic Sitting Balance independent  -     Static Standing Balance standby assist  -     Dynamic Standing Balance contact guard  -       Row Name 10/04/23 1426          Sensory Assessment  (Somatosensory)    Sensory Assessment (Somatosensory) sensation intact  -               User Key  (r) = Recorded By, (t) = Taken By, (c) = Cosigned By      Initials Name Provider Type    Lucia Sarmiento, PT Physical Therapist                   Goals/Plan       Row Name 10/04/23 1431          Transfer Goal 1 (PT)    Activity/Assistive Device (Transfer Goal 1, PT) transfers, all  -     [place] Level/Cues Needed (Transfer Goal 1, PT) independent  -     Time Frame (Transfer Goal 1, PT) short term goal (STG);1 week  -       Row Name 10/04/23 1431          Gait Training Goal 1 (PT)    Activity/Assistive Device (Gait Training Goal 1, PT) gait (walking locomotion);assistive device use;walker, rolling  -     [place] Level (Gait Training Goal 1, PT) modified independence  -     Distance (Gait Training Goal 1, PT) 300'  -     Time Frame (Gait Training Goal 1, PT) 2 weeks  -       Row Name 10/04/23 1431          Patient Education Goal (PT)    Activity (Patient Education Goal, PT) HEP  -     [place]/Cues/Accuracy (Memory Goal 2, PT) demonstrates adequately;verbalizes understanding  -       Row Name 10/04/23 1431          Therapy Assessment/Plan (PT)    Planned Therapy Interventions (PT) bed mobility training;gait training;transfer training;home exercise program;strengthening  -               User Key  (r) = Recorded By, (t) = Taken By, (c) = Cosigned By      Initials Name Provider Type    Lucia Sarmiento, PT Physical Therapist                   Clinical Impression       Row Name 10/04/23 1426          Pain    Pretreatment Pain Rating 0/10 - no pain  -     Posttreatment Pain Rating 0/10 - no pain  -       Row Name 10/04/23 1426          Plan of Care Review    Plan of Care Reviewed With patient  -     Outcome Evaluation 89 yo female admitted with heart palpitations.  Pt with anemia, MISSAEL and tachycardia.  Oncology is following for w/u of possible leukemia, needs bone marrow biopsy.  Pt  is from home alone, normally independent, uses rollator outside of home and reports she walks 1/2 mile every morning.  Pt is eager to get OOB, reports feeling a little weak today.  Pt required CGA for in room mobility, ambualtes to bathroom and x 75' in reyes holding to handrails as needed.  Pt would benefit from RW to use while IP.  Will follow pt 3x/week with recommendation for home with HHPT at d/c.  -       Row Name 10/04/23 1426          Therapy Assessment/Plan (PT)    Rehab Potential (PT) good, to achieve stated therapy goals  -     Criteria for Skilled Interventions Met (PT) yes;skilled treatment is necessary  -     Therapy Frequency (PT) 3 times/wk  -       Row Name 10/04/23 1426          Vital Signs    O2 Delivery Pre Treatment room air  -     O2 Delivery Intra Treatment room air  -     O2 Delivery Post Treatment room air  -       Row Name 10/04/23 1426          Positioning and Restraints    Pre-Treatment Position in bed  -     Post Treatment Position chair  -     In Chair notified nsg;sitting;call light within reach;encouraged to call for assist  -               User Key  (r) = Recorded By, (t) = Taken By, (c) = Cosigned By      Initials Name Provider Type     Lucia Fuentes, PT Physical Therapist                   Outcome Measures       Row Name 10/04/23 1434 10/04/23 0840       How much help from another person do you currently need...    Turning from your back to your side while in flat bed without using bedrails? 4  - 3  -TA    Moving from lying on back to sitting on the side of a flat bed without bedrails? 4  - 3  -TA    Moving to and from a bed to a chair (including a wheelchair)? 3  - 3  -TA    Standing up from a chair using your arms (e.g., wheelchair, bedside chair)? 3  - 2  -TA    Climbing 3-5 steps with a railing? 3  - 2  -TA    To walk in hospital room? 3  - 2  -TA    AM-PAC 6 Clicks Score (PT) 20  - 15  -TA    Highest level of mobility 6 --> Walked 10 steps or  more  - 4 --> Transferred to chair/commode  -MILDRED              User Key  (r) = Recorded By, (t) = Taken By, (c) = Cosigned By      Initials Name Provider Type     Lucia Fuentes, PT Physical Therapist    Melly Green, RN Registered Nurse                                 Physical Therapy Education       Title: PT OT SLP Therapies (Done)       Topic: Physical Therapy (Done)       Point: Mobility training (Done)       Learning Progress Summary             Patient Acceptance, E,TB, VU by  at 10/4/2023 1435                                         User Key       Initials Effective Dates Name Provider Type Discipline     06/16/21 -  Lucia Fuentes, PT Physical Therapist PT                  PT Recommendation and Plan  Planned Therapy Interventions (PT): bed mobility training, gait training, transfer training, home exercise program, strengthening  Plan of Care Reviewed With: patient  Outcome Evaluation: 87 yo female admitted with heart palpitations.  Pt with anemia, MISSAEL and tachycardia.  Oncology is following for w/u of possible leukemia, needs bone marrow biopsy.  Pt is from home alone, normally independent, uses rollator outside of home and reports she walks 1/2 mile every morning.  Pt is eager to get OOB, reports feeling a little weak today.  Pt required CGA for in room mobility, ambualtes to bathroom and x 75' in reyes holding to handrails as needed.  Pt would benefit from RW to use while IP.  Will follow pt 3x/week with recommendation for home with HHPT at d/c.     Time Calculation:         PT Charges       Row Name 10/04/23 1438             Time Calculation    Start Time 1310  -      Stop Time 1333  -      Time Calculation (min) 23 min  -      PT Received On 10/04/23  -      PT - Next Appointment 10/05/23  -      PT Goal Re-Cert Due Date 10/18/23  -         Time Calculation- PT    Total Timed Code Minutes- PT 23 minute(s)  -                User Key  (r) = Recorded By, (t) = Taken By, (c) = Cosigned  By      Initials Name Provider Type    Lucia Sarmiento, PT Physical Therapist                  Therapy Charges for Today       Code Description Service Date Service Provider Modifiers Qty    [id number] HC PT EVAL LOW COMPLEXITY 4 10/4/2023 Lucia Fuentes, PT GP 1            PT G-Codes  AM-PAC 6 Clicks Score (PT): 20  PT Discharge Summary  Anticipated Discharge Disposition (PT): home with home health    Lucia Fuentes PT  10/4/2023

## 2023-10-04 NOTE — PROGRESS NOTES
Cardiology Progress Note    Patient Identification:  Name: Tricia Marroquin  Age: 88 y.o.  Sex: female  :  1935  MRN: [id number]                 Follow Up / Chief Complaint: Arrhythmia  acute on chronic HFpEf   Chief Complaint   Patient presents with    Chest Pain       Interval History: Patient presented with palpations and irregular heart rate.  Patient is noted to have possible ectopic atrial arrhythmia.  she remains on Cardizem drip      NP NOTE: Patient seen and examined this morning feeling much better asking to walk today.  We will have PT see patient.  On telemetry review patient is still intermittently tachycardic in the low 100s but also having second-degree AV block/Wenckebach.  Discussed labs with patient and son at bedside they are asking if she has some type of infection or is this truly likely leukemia  I discussed that the oncology MD or NP will be around to discuss.      We will discuss with Dr. Glass regarding EP consult    Electronically signed by KAISER Simmons, 10/04/23, 11:58 AM EDT.    Cardiology attending addendum :    I have personally performed a face-to-face diagnostic evaluation, physical exam and reviewed data on this patient.  I have reviewed documentation done by me and nurse practitioner  and corrected as needed.  And agree with the different components of documentation.Greater than 50% of the time spent in the care of this patient was provided by attending consultant/me.           Subjective: Patient seen and examined.  Chart reviewed.  Labs reviewed.  Discussed with RN taking care of patient.        Objective: High-sensitivity troponin 18--17  proBNP 2452 hemoglobin A1c 6.4 BUN 37 creatinine 1.65 TSH 6.0 WBC 68 hemoglobin 7.8 platelets 58  10/3/2023: , BUN 38 creatinine 1.98 magnesium 2.8 uric acid 15 fibrinogen normal WBC 16.8 hemoglobin 7.3 platelets 62 urinalysis unremarkable  10/4/2023: , potassium 4.3 BUN 36 creatinine 1.67 uric acid 14.4 Free T4  normal WBCs 83 hemoglobin 7.5 platelets 65        History of Present Illness:      Ms. Tricia Marroquin has PMH of     CAD, stent to LCx 5/12/2016, NSTEMI, cath 9/23/2022 VALENTINO to in-stent RCA stenosis  Questionable paroxysmal A. fib, no documentation found  Dyspnea due to Brilinta  Hypertension  Dyslipidemia  PAD, aortobifem 10/5/2007, right common femoral arthrectomy with patch angioplasty 12/2008  Thrombocytopenia  JELENA, back surgery, aorta surgery  Former smoker quit in 1999  Allergy to penicillin  Allergy to aspirin but tolerates baby aspirin  Family history of leukemia in aunt      Presented to the ED on 10/3/2023 with shortness of breath and irregular heart beat.  Patient reports palpitations and irregular heart beat woke her up out of sleep.  She says her heart rate was 114 and pounding.   She denies any chest pain however reports worsening dyspnea on exertion and orthopnea over the last few weeks.  No loss of appetite or lower extremity edema.  Patient also reports one month of dark tarry stools but was also started on iron supplement by PCP.      In the ED EKG showed questionable afib vs ectopic atrial tachycardia.  HS troponin 18--17, pro bnp 2457 BUN 37 creatinine 1.65  eGFR 30 A1C 6.4 TSH 6.0  iron studies unremarkable, lactic normal, WBC 68 hgb 7.8 plts 58   CXR negative           Cardiology attending addendum :     I have personally performed a face-to-face diagnostic evaluation, physical exam and reviewed data on this patient.  I have reviewed documentation done by me and nurse practitioner  and corrected as needed.  And agree with the different components of documentation.Greater than 50% of the time spent in the care of this patient was provided by attending consultant/me.           Presented through ER 10/3/2023 with complaint of palpitations and racing heart going on since 3 AM.  Has intermittent shortness of breath dizziness fatigue and dyspnea on exertion.     Patient was recently sent from [facility]  9/22/2022 with questionable diagnosis of A. fib.  Review of records from [facility] revealed patient was in sinus arrhythmia and PACs but no inocencio A. fib.  Patient was having chest pain at that time therefore was admitted to the hospital        Previous labs:  Labs from [facility] 9/16/2022 reveal troponin mildly elevated hemoglobin 10.6 platelet count 71.  proBNP normal at 65.  Glucose elevated at 105.  Patient work-up 4/12/2022 revealed normal troponin, normal proBNP at 138.  Normal CMP, PT PTT.  CBC with low white count at 2.8, hemoglobin at 11.4, platelet count of 64  Chest x-ray 4/12/2022 reveals no acute cardiopulmonary abnormality.  EKG reviewed by me from 4/12/2022 reveals sinus rhythm with sinus bradycardia at the rate of 57 bpm  Labs from 9/15/2022 reveal CBC with a hemoglobin of 10.6, normal CMP and BNP at 65.  Labs from 9/24/2022 revealed A1c of 6.  Lipid profile with cholesterol 106, triglycerides 74, HDL 48, LDL 43     Previous cardiac work-up:  Cath 9/23/2023 PCI to distal RCA in-stent stenosis  Lexiscan Cardiolite 4/13/2022 normal perfusion EF of 78%  Echo 5/9/2022 EF 60 to 65% severely enlarged left atrium by volume.     Data:  Labs 10/3/2023: HS troponin 18-->17.  proBNP 2457.  CMP with a creatinine of 1.65, EGFR 30.  A1c of 6.4, TSH elevated at 6.01.  White count 68,000, hemoglobin 7.8, , platelet count 58 K  Chest x-ray 10/3/2023 no acute cardiopulmonary abnormality.  EKG done 10/3/2023 reviewed/interpreted by me reveals ectopic atrial tachycardia at the rate of 113 bpm        Assessment:  :        Palpitations  Sinus tachycardia with Wenckebach second-degree AV block  Acute HFpEF due to diastolic dysfunction from anemia and tachycardia.  Marked leukocytosis with left shift and numerous circulating blast cells  Microcytic anemia  Thrombocytopenia  CAD, MI, PCI  PAD history of surgery  Hypertension  Dyslipidemia  Former smoker  Thrombocytopenia  Easy bruising  Diabetes with hemoglobin A1c of  "6.4  Prediabetes, labs from 2022 reveal A1c of 6        Recommendations / Plan:         Telemetry is revealing sinus rhythm  Patient's white count is elevated and has blast cells.  We will consult hematology oncology.  Discussed with Dr. Gutierrez yesterday.    Will continue aspirin atorvastatin, clopidogrel, carvedilol, isosorbide, Atacand hydrochlorothiazide, Aldactone, furosemide as tolerated    The concern is AML.  Discussed with patient and family numbers by bedside.  Patient is refusing bone marrow biopsy.  Had a lengthy discussion and informed patient about the same.            Copied text in this portion of the note has been reviewed and is accurate as of 10/4/2023    Past Medical History:  Past Medical History:   Diagnosis Date    Hypertension     Seasonal allergies      Past Surgical History:  Past Surgical History:   Procedure Laterality Date    AORTA SURGERY      BACK SURGERY      CARDIAC CATHETERIZATION N/A 2022    Procedure: Left Heart Cath;  Surgeon: Rodriguez Glass MD;  Location: [facility] CATH INVASIVE LOCATION;  Service: Cardiovascular;  Laterality: N/A;    CORONARY ANGIOPLASTY WITH STENT PLACEMENT  2022        Social History:   Social History     Tobacco Use    Smoking status: Former     Packs/day: 2.00     Years: 40.00     Pack years: 80.00     Types: Cigarettes     Start date:      Quit date:      Years since quittin.7    Smokeless tobacco: Never   Substance Use Topics    Alcohol use: Never      Family History:  Family History   Problem Relation Age of Onset    Heart disease Mother     Heart disease Father     Heart attack Father     Lung cancer Brother         associated to intense cigarette smoking          Allergies:  Allergies   Allergen Reactions    Aspirin Other (See Comments)     Pt reports \"makes my heart go crazy\"    Codeine GI Intolerance    Penicillins Hives    Lisinopril Cough     Scheduled Meds:  allopurinol, 100 mg, Daily  atorvastatin, 20 mg, " "Daily  carvedilol, 12.5 mg, BID With Meals  cetirizine, 10 mg, Daily  [START ON 10/5/2023] ferrous sulfate, 324 mg, Daily With Breakfast  hydroxyurea, 500 mg, BID  pantoprazole, 40 mg, Daily  senna-docusate sodium, 2 tablet, BID  sodium chloride, 10 mL, Q12H          Review of Systems:   ROS  Review of Systems   Constitution: Negative for chills and fever.   Cardiovascular: Negative for chest pain and palpitations.   Respiratory: Negative for cough and hemoptysis.    Gastrointestinal: Negative for nausea.        Constitutional:  Temp:  [97.3 øF (36.3 øC)-98.2 øF (36.8 øC)] 97.6 øF (36.4 øC)  Heart Rate:  [] 114  Resp:  [17-25] 17  BP: ()/() 114/65    Physical Exam   /65 (BP Location: Left arm, Patient Position: Lying)   Pulse 114   Temp 97.6 øF (36.4 øC) (Oral)   Resp 17   Ht 154.9 cm (61\")   Wt 78 kg (172 lb)   SpO2 94%   BMI 32.50 kg/mý   General:  Appears in no acute distress  Eyes: Sclera is anicteric,  conjunctiva is clear   HEENT:  No JVD. Thyroid not visibly enlarged. No mucosal pallor or cyanosis  Respiratory: Respirations regular and unlabored at rest.  Bilaterally good breath sounds, with good air entry in all fields. No crackles, rubs or wheezes auscultated  Cardiovascular: S1,S2 irregular rate and rhythm. No murmur, rub or gallop auscultated.  . No pretibial pitting edema  Gastrointestinal: Abdomen nondistended, soft  Musculoskeletal:  No abnormal movements  Extremities: No digital clubbing or cyanosis  Skin: Color pink. Skin warm and dry to touch. No rashes  No xanthoma  Neuro: Alert and awake, no lateralizing deficits appreciated    INTAKE AND OUTPUT:    Intake/Output Summary (Last 24 hours) at 10/4/2023 6883  Last data filed at 10/4/2023 1000  Gross per 24 hour   Intake 480 ml   Output 400 ml   Net 80 ml       Cardiographics  Telemetry: Tachycardia with Wenke Bach    ECG:   ECG 12 Lead Rhythm Change   Preliminary Result   HEART RATE= 83  bpm   RR Interval= 721  ms   LA " Interval= 265  ms   P Horizontal Axis= 195  deg   P Front Axis= -58  deg   QRSD Interval= 86  ms   QT Interval= 362  ms   QTcB= 426  ms   QRS Axis= 73  deg   T Wave Axis= 40  deg   - ABNORMAL ECG -   Sinus or ectopic atrial rhythm   Supraventricular bigeminy   Prolonged DE interval   Low voltage, precordial leads   Anteroseptal infarct, old   Electronically Signed By:    Date and Time of Study: 2023-10-03 13:50:19      ECG 12 Lead Chest Pain   Final Result   HEART RATE= 112  bpm   RR Interval= 552  ms   DE Interval= 266  ms   P Horizontal Axis= 207  deg   P Front Axis= 187  deg   QRSD Interval= 85  ms   QT Interval= 315  ms   QTcB= 424  ms   QRS Axis= 74  deg   T Wave Axis= 6  deg   - ABNORMAL ECG -   Sinus or ectopic atrial tachycardia   Atrial premature complexes   Prolonged DE interval   When compared with ECG of 24-Sep-2022 9:22:12,   Significant change in rhythm: previously sinus   New conduction abnormality   Electronically Signed By: Constantin Griffin) 04-Oct-2023 07:47:11   Date and Time of Study: 2023-10-03 04:56:05      SCANNED - TELEMETRY     Final Result      SCANNED - TELEMETRY     Final Result      SCANNED - TELEMETRY     Final Result      SCANNED - TELEMETRY     Final Result      SCANNED - TELEMETRY     Final Result      SCANNED - TELEMETRY     Final Result      SCANNED - TELEMETRY     Final Result        I have personally reviewed EKG    Echocardiogram: Results for orders placed during the hospital encounter of 05/09/22    Adult Transthoracic Echo Complete W/ Cont if Necessary Per Protocol    Interpretation Summary  Normal LV size and contractility EF of 60 to 65%  Normal RV size  Left atrium appears severely enlarged by volume.  Interatrial septum is not well visualized.  Aortic valve, mitral valve, tricuspid valve appears structurally normal, trace tricuspid regurgitation seen.  Calculated RV systolic pressure of 27 mmHg  No pericardial effusion seen.  Proximal aorta appears normal in  size.      Lab Review   I have reviewed the labs  Results from last 7 days   Lab Units 10/03/23  0724 10/03/23  0524   HSTROP T ng/L 17* 18*     Results from last 7 days   Lab Units 10/04/23  0813   MAGNESIUM mg/dL 2.3     Results from last 7 days   Lab Units 10/04/23  0813   SODIUM mmol/L 141   POTASSIUM mmol/L 4.3   BUN mg/dL 36*   CREATININE mg/dL 1.67*   CALCIUM mg/dL 9.3         Results from last 7 days   Lab Units 10/04/23  0814 10/03/23  2041 10/03/23  1721 10/03/23  0524   WBC 10*3/mm3 83.00* 69.80*  --  68.00*   HEMOGLOBIN g/dL 7.5* 7.3* 7.3* 7.8*   HEMATOCRIT % 24.3* 23.0*  --  24.8*   PLATELETS 10*3/mm3 65* 62*  --  58*     Results from last 7 days   Lab Units 10/03/23  0524   INR  1.01   APTT seconds 26.7*       RADIOLOGY:  Imaging Results (Last 24 Hours)       Procedure Component Value Units Date/Time    US Renal Bilateral [[id number]] Collected: 10/04/23 1129     Updated: 10/04/23 1133    Narrative:      US RENAL BILATERAL    Date of Exam: 10/4/2023 10:35 AM EDT    Indication: brittany.    Comparison: No comparisons available.    Technique: Grayscale and color Doppler ultrasound evaluation of the kidneys and urinary bladder was performed.      Findings:  The right kidney has a maximal mghi-hr-ssdd length of 9 cm. The left kidney has a maximal asvn-vv-sdjq length of 8.9 cm. There is a simple upper pole left renal cyst measuring 1.9 x 1.6 x 1.9 cm. There is no evidence of hydronephrosis. No perinephric   fluid collections are identified. The urinary bladder appears normal. Renal echo pattern is increased in both kidneys suggesting medical renal disease.      Impression:      Impression:    1. Increased echogenicity in both kidneys suggesting medical renal disease.  2. No evidence of obstructive uropathy.  3. 1.9 cm left renal cyst.        Electronically Signed: Deepak Pineda MD    10/4/2023 11:31 AM EDT    Workstation ID: [id number]                  )10/4/2023  Rodriguez Galss MD      EMR  "Dragon/Transcription:   \"Dictated utilizing Dragon dictation\".   "

## 2023-10-04 NOTE — PLAN OF CARE
Problem: Adult Inpatient Plan of Care  Goal: Absence of Hospital-Acquired Illness or Injury  Intervention: Identify and Manage Fall Risk  Recent Flowsheet Documentation  Taken 10/4/2023 1636 by Melly Keyes RN  Safety Promotion/Fall Prevention:   clutter free environment maintained   assistive device/personal items within reach   fall prevention program maintained   lighting adjusted   nonskid shoes/slippers when out of bed  Taken 10/4/2023 1430 by Melly Keyes RN  Safety Promotion/Fall Prevention:   assistive device/personal items within reach   clutter free environment maintained   fall prevention program maintained   lighting adjusted   nonskid shoes/slippers when out of bed   room organization consistent   safety round/check completed  Taken 10/4/2023 1218 by Melly Keyes RN  Safety Promotion/Fall Prevention:   assistive device/personal items within reach   clutter free environment maintained   fall prevention program maintained   lighting adjusted   nonskid shoes/slippers when out of bed   room organization consistent   safety round/check completed  Taken 10/4/2023 0840 by Melly Keyes RN  Safety Promotion/Fall Prevention:   assistive device/personal items within reach   clutter free environment maintained   fall prevention program maintained   lighting adjusted   nonskid shoes/slippers when out of bed   room organization consistent   safety round/check completed  Intervention: Prevent Skin Injury  Recent Flowsheet Documentation  Taken 10/4/2023 1636 by Melly Keyes RN  Body Position: position changed independently  Taken 10/4/2023 1218 by Melly Keyes RN  Body Position: position changed independently  Taken 10/4/2023 0840 by Melly Keyes RN  Body Position: position changed independently  Intervention: Prevent and Manage VTE (Venous Thromboembolism) Risk  Recent Flowsheet Documentation  Taken 10/4/2023 1636 by Melly Keyes RN  Activity Management: activity encouraged  Range of Motion: active ROM  (range of motion) encouraged  Taken 10/4/2023 1430 by Melly Keyes RN  Activity Management: activity encouraged  Taken 10/4/2023 1218 by Melly Keyes RN  Activity Management: activity encouraged  Taken 10/4/2023 0840 by Melly Keyes RN  Activity Management: activity encouraged  Range of Motion: active ROM (range of motion) encouraged  Intervention: Prevent Infection  Recent Flowsheet Documentation  Taken 10/4/2023 1636 by Melly Keyes RN  Infection Prevention:   hand hygiene promoted   personal protective equipment utilized  Taken 10/4/2023 1430 by Melly Keyes RN  Infection Prevention:   hand hygiene promoted   personal protective equipment utilized  Taken 10/4/2023 1218 by Melly Keyes RN  Infection Prevention:   hand hygiene promoted   personal protective equipment utilized  Goal: Optimal Comfort and Wellbeing  Intervention: Provide Person-Centered Care  Recent Flowsheet Documentation  Taken 10/4/2023 1636 by Melly Keyes RN  Trust Relationship/Rapport: care explained  Taken 10/4/2023 1218 by Melly Keyes RN  Trust Relationship/Rapport: care explained  Taken 10/4/2023 0840 by Melly Keyes RN  Trust Relationship/Rapport: care explained     Problem: Pain Acute  Goal: Acceptable Pain Control and Functional Ability  Intervention: Prevent or Manage Pain  Recent Flowsheet Documentation  Taken 10/4/2023 1636 by Melly Keyes RN  Medication Review/Management: medications reviewed  Taken 10/4/2023 1430 by Melly Keyes RN  Medication Review/Management: medications reviewed  Taken 10/4/2023 1218 by Melly Keyes RN  Medication Review/Management: medications reviewed  Taken 10/4/2023 0840 by Melly Keyes RN  Medication Review/Management: medications reviewed  Intervention: Optimize Psychosocial Wellbeing  Recent Flowsheet Documentation  Taken 10/4/2023 1636 by Melly Keyes RN  Supportive Measures: active listening utilized  Diversional Activities: television  Taken 10/4/2023 1218 by Melly Keyes RN  Supportive  Measures: active listening utilized  Diversional Activities: television  Taken 10/4/2023 0840 by Melly Keyes RN  Supportive Measures: active listening utilized  Diversional Activities: television     Problem: Heart Failure Comorbidity  Goal: Maintenance of Heart Failure Symptom Control  Intervention: Maintain Heart Failure-Management  Recent Flowsheet Documentation  Taken 10/4/2023 1636 by Melly Keyes RN  Medication Review/Management: medications reviewed  Taken 10/4/2023 1430 by Melly Keyse RN  Medication Review/Management: medications reviewed  Taken 10/4/2023 1218 by Melly Keyes RN  Medication Review/Management: medications reviewed  Taken 10/4/2023 0840 by Melly Keyes RN  Medication Review/Management: medications reviewed     Problem: Hypertension Comorbidity  Goal: Blood Pressure in Desired Range  Intervention: Maintain Blood Pressure Management  Recent Flowsheet Documentation  Taken 10/4/2023 1636 by Melly Keyes RN  Medication Review/Management: medications reviewed  Taken 10/4/2023 1430 by Melly Keyes RN  Medication Review/Management: medications reviewed  Taken 10/4/2023 1218 by Melly Keyes RN  Medication Review/Management: medications reviewed  Taken 10/4/2023 0840 by Melly Keyes RN  Medication Review/Management: medications reviewed     Problem: Skin Injury Risk Increased  Goal: Skin Health and Integrity  Intervention: Optimize Skin Protection  Recent Flowsheet Documentation  Taken 10/4/2023 1636 by Melly Keyes RN  Pressure Reduction Techniques: frequent weight shift encouraged  Head of Bed (HOB) Positioning: [facility] [place] elevated  Pressure Reduction Devices: pressure-redistributing mattress utilized  Taken 10/4/2023 1218 by Melly Keyes RN  Pressure Reduction Techniques: frequent weight shift encouraged  Head of Bed (HOB) Positioning: HOB elevated  Pressure Reduction Devices: pressure-redistributing mattress utilized  Taken 10/4/2023 0840 by Melly Keyes RN  Pressure Reduction Techniques:  frequent weight shift encouraged  Head of Bed (HOB) Positioning: HOB elevated  Pressure Reduction Devices: pressure-redistributing mattress utilized     Problem: Fall Injury Risk  Goal: Absence of Fall and Fall-Related Injury  Intervention: Identify and Manage Contributors  Recent Flowsheet Documentation  Taken 10/4/2023 1636 by Melly Keyes RN  Medication Review/Management: medications reviewed  Taken 10/4/2023 1430 by Melly Keyes RN  Medication Review/Management: medications reviewed  Taken 10/4/2023 1218 by Melly Keyes RN  Medication Review/Management: medications reviewed  Taken 10/4/2023 0840 by Melly Keyes RN  Medication Review/Management: medications reviewed  Intervention: Promote Injury-Free Environment  Recent Flowsheet Documentation  Taken 10/4/2023 1636 by Melly Keyes RN  Safety Promotion/Fall Prevention:   clutter free environment maintained   assistive device/personal items within reach   fall prevention program maintained   lighting adjusted   nonskid shoes/slippers when out of bed  Taken 10/4/2023 1430 by Melly Keyes RN  Safety Promotion/Fall Prevention:   assistive device/personal items within reach   clutter free environment maintained   fall prevention program maintained   lighting adjusted   nonskid shoes/slippers when out of bed   room organization consistent   safety round/check completed  Taken 10/4/2023 1218 by Melly Keyes RN  Safety Promotion/Fall Prevention:   assistive device/personal items within reach   clutter free environment maintained   fall prevention program maintained   lighting adjusted   nonskid shoes/slippers when out of bed   room organization consistent   safety round/check completed  Taken 10/4/2023 0840 by Melly Keyes RN  Safety Promotion/Fall Prevention:   assistive device/personal items within reach   clutter free environment maintained   fall prevention program maintained   lighting adjusted   nonskid shoes/slippers when out of bed   room organization  consistent   safety round/check completed   Goal Outcome Evaluation:     Patient had Renal US and Echo today. Awaiting to talk to Dr Gutierrez about Bone marrow biopsy. Cardizem gtt running at 7.5. No complaints at this time. Care plan ongoing.

## 2023-10-04 NOTE — PROGRESS NOTES
Hematology/Oncology Inpatient Progress Note    PATIENT NAME: Tricia Marroquin  : 1935  MRN: [id number]    CHIEF COMPLAINT: shortness of breath    HISTORY OF PRESENT ILLNESS:      Tricia Marroquin is a 88 y.o. female who presented to [facility] on 10/3/2023 with complaints of difficulty breathing and irregular heartbeats.  Patient had denied chest pain but has had worsening orthopnea.  She had some back stools as well prior to presentation to the hospital.  In the emergency room she was found to have A-fib elevated proBNP, significant leukocytosis and thrombocytopenia.  She also was found to have acute kidney injury with a creatinine of 1.6.  Review of her CBC showed a white count of 68,000, hemoglobin 7.8, MCV was 106 and platelets of 58,000 with evidence of leukoerythroblastic changes, up to 22% blast forms, promyelocytes myelocytes.  PT was normal at 11, INR is 1 PTT was 26.7 and magnesium was normal at 2.4.  She has a normal calcium and iron studies were unremarkable.     Her peripheral smear also concurs with the above blast forms     10/03/23  Hematology/Oncology was consulted number cytopenia.  Patient was seen by Dr. Paez back in .  Her thrombocytopenia was dating back to  with platelet counts ranging between 57 and 10 4000 mild anemia and leukopenia.  Patient was recommended to follow-up in the outpatient setting for possible bone marrow biopsy.     He/She  has a past medical history of Hypertension and Seasonal allergies.     PCP: Danish Herbert MD  Subjective     Patient is declining bone marrow aspiration and biopsy.  She has no issues overnight.  Her son is visiting    ROS:  Review of Systems   Constitutional:  Positive for fatigue.   Neurological:  Positive for weakness.      MEDICATIONS:    Scheduled Meds:  senna-docusate sodium, 2 tablet, Oral, BID  sodium chloride, 10 mL, Intravenous, Q12H       Continuous Infusions:  dilTIAZem, 5-15 mg/hr, Last Rate: 5 mg/hr  "(10/04/23 0504)       PRN Meds:    acetaminophen    senna-docusate sodium **AND** polyethylene glycol **AND** bisacodyl **AND** bisacodyl    nitroglycerin    [COMPLETED] Insert Peripheral IV **AND** sodium chloride    sodium chloride    sodium chloride     ALLERGIES:    Allergies   Allergen Reactions    Aspirin Other (See Comments)     Pt reports \"makes my heart go crazy\"    Codeine GI Intolerance    Penicillins Hives    Lisinopril Cough       Objective    VITALS:   /50   Pulse 86   Temp 97.3 øF (36.3 øC)   Resp 20   Ht 154.9 cm (61\")   Wt 78 kg (172 lb)   SpO2 93%   BMI 32.50 kg/mý     PHYSICAL EXAM: (performed by MD)  Physical Exam  Constitutional:       Appearance: She is ill-appearing.   HENT:      Right Ear: External ear normal.      Left Ear: External ear normal.   Pulmonary:      Effort: Pulmonary effort is normal.   Neurological:      General: No focal deficit present.      Mental Status: She is oriented to person, place, and time. Mental status is at baseline.   Psychiatric:         Mood and Affect: Mood normal.         Behavior: Behavior normal.         Thought Content: Thought content normal.         Judgment: Judgment normal.         RECENT LABS:  Lab Results (last 24 hours)       Procedure Component Value Units Date/Time    Blood Culture - Blood, Arm, Right [[id number]]  (Normal) Collected: 10/03/23 0724    Specimen: Blood from Arm, Right Updated: 10/04/23 0730     Blood Culture No growth at 24 hours    Blood Culture - Blood, Wrist, Left [[id number]]  (Normal) Collected: 10/03/23 0700    Specimen: Blood from Wrist, Left Updated: 10/04/23 0715     Blood Culture No growth at 24 hours    Narrative:      Less than seven (7) mL's of blood was collected.  Insufficient quantity may yield false negative results.    Manual Differential [[id number]]  (Abnormal) Collected: 10/03/23 2041    Specimen: Blood Updated: 10/03/23 2202     Neutrophil % 12.0 %      Lymphocyte % 15.0 %      Monocyte % 21.0 %      " Metamyelocyte % 4.0 %      Blasts % 48.0 %      Neutrophils Absolute 8.38 10*3/mm3      Lymphocytes Absolute 10.47 10*3/mm3      Monocytes Absolute 14.66 10*3/mm3      nRBC 1.0 /100 WBC      Anisocytosis Slight/1+     Dacrocytes Slight/1+     Macrocytes Slight/1+     Ovalocytes Slight/1+     Poikilocytes Slight/1+     Polychromasia Slight/1+     WBC Morphology Normal     Large Platelets Slight/1+    Narrative:      Reviewed by Pathologist within the past 30 days on 10/3/23 .      CBC & Differential [[id number]]  (Abnormal) Collected: 10/03/23 2041    Specimen: Blood Updated: 10/03/23 2202    Narrative:      The following orders were created for panel order CBC & Differential.  Procedure                               Abnormality         Status                     ---------                               -----------         ------                     CBC Auto Differential[[id number]]        Abnormal            Final result               Scan Slide[[id number]]                                       Final result                 Please view results for these tests on the individual orders.    Scan Slide [[id number]] Collected: 10/03/23 2041    Specimen: Blood Updated: 10/03/23 2202     Scan Slide --     Comment: See Manual Differential Results       CBC Auto Differential [[id number]]  (Abnormal) Collected: 10/03/23 2041    Specimen: Blood Updated: 10/03/23 2202     WBC 69.80 10*3/mm3      RBC 2.19 10*6/mm3      Hemoglobin 7.3 g/dL      Hematocrit 23.0 %      .3 fL      MCH 33.5 pg      MCHC 31.8 g/dL      RDW 21.6 %      RDW-SD 84.0 fl      MPV 10.6 fL      Platelets 62 10*3/mm3     Comprehensive Metabolic Panel [[id number]]  (Abnormal) Collected: 10/03/23 2041    Specimen: Blood Updated: 10/03/23 2117     Glucose 142 mg/dL      BUN 38 mg/dL      Creatinine 1.98 mg/dL      Sodium 139 mmol/L      Potassium 4.4 mmol/L      Chloride 105 mmol/L      CO2 23.0 mmol/L      Calcium 8.8 mg/dL      Total Protein 6.2 g/dL       Albumin 3.8 g/dL      ALT (SGPT) 14 U/L      AST (SGOT) 20 U/L      Alkaline Phosphatase 64 U/L      Total Bilirubin 0.3 mg/dL      Globulin 2.4 gm/dL      A/G Ratio 1.6 g/dL      BUN/Creatinine Ratio 19.2     Anion Gap 11.0 mmol/L      eGFR 24.2 mL/min/1.73     Narrative:      GFR Normal >60  Chronic Kidney Disease <60  Kidney Failure <15    The GFR formula is only valid for adults with stable renal function between ages 18 and 70.    Magnesium [[id number]]  (Abnormal) Collected: 10/03/23 2041    Specimen: Blood Updated: 10/03/23 2117     Magnesium 2.8 mg/dL     Uric Acid [[id number]]  (Abnormal) Collected: 10/03/23 2041    Specimen: Blood Updated: 10/03/23 2117     Uric Acid 15.0 mg/dL     Lactate Dehydrogenase [[id number]]  (Abnormal) Collected: 10/03/23 2041    Specimen: Blood Updated: 10/03/23 2117      U/L     Phosphorus [[id number]]  (Normal) Collected: 10/03/23 2041    Specimen: Blood Updated: 10/03/23 2117     Phosphorus 4.1 mg/dL     Fibrinogen [[id number]]  (Normal) Collected: 10/03/23 2041    Specimen: Blood Updated: 10/03/23 2109     Fibrinogen 329 mg/dL     Blood Culture - Blood, Wrist, Left [[id number]] Collected: 10/03/23 2041    Specimen: Blood from Wrist, Left Updated: 10/03/23 2053    Flow Cytometry [[id number]] Collected: 10/03/23 2041    Specimen: Blood Updated: 10/03/23 2052    Urinalysis With Microscopic If Indicated (No Culture) - Urine, Clean Catch [[id number]]  (Normal) Collected: 10/03/23 2003    Specimen: Urine, Clean Catch Updated: 10/03/23 2023     Color, UA Yellow     Appearance, UA Clear     pH, UA 7.5     Specific Gravity, UA 1.009     Glucose, UA Negative     Ketones, UA Negative     Bilirubin, UA Negative     Blood, UA Negative     Protein, UA Negative     Leuk Esterase, UA Negative     Nitrite, UA Negative     Urobilinogen, UA 0.2 E.U./dL    Narrative:      Urine microscopic not indicated.    Hemoglobin [[id number]]  (Abnormal) Collected: 10/03/23 1722    Specimen: Blood Updated:  10/03/23 1737     Hemoglobin 7.3 g/dL     Folate [[id number]]  (Normal) Collected: 10/03/23 0950    Specimen: Blood Updated: 10/03/23 1714     Folate 5.27 ng/mL     Narrative:      Results may be falsely increased if patient taking Biotin.      Vitamin B12 [[id number]]  (Abnormal) Collected: 10/03/23 0950    Specimen: Blood Updated: 10/03/23 1714     Vitamin B-12 1,521 pg/mL     Narrative:      Results may be falsely increased if patient taking Biotin.      Pathology Consultation [[id number]] Collected: 10/03/23 0524    Specimen: Blood, Venous Line Updated: 10/03/23 1543     Final Diagnosis --     Marked leukocytosis with left shift and numerous circulating blasts (22%)  Macrocytic anemia  Thrombocytopenia    Comment: Flow cytometry is recommended to further evaluate for leukemia. Bone marrow biopsy should be considered clinically.        Case Report --     Surgical Pathology Report                         Case: [id number]                                  Authorizing Provider:  Constantin Griffin DO  Collected:           10/03/2023 05:24 AM          Ordering Location:     [facility]       Received:            10/03/2023 07:27 AM                                 EMERGENCY DEPARTMENT                                                         Pathologist:           Emile Conway MD                                                             Specimen:    Blood, Venous Line                                                                         Hemoglobin A1c [[id number]]  (Abnormal) Collected: 10/03/23 0524    Specimen: Blood Updated: 10/03/23 0954     Hemoglobin A1C 6.40 %     Iron Profile [[id number]]  (Normal) Collected: 10/03/23 0724    Specimen: Blood Updated: 10/03/23 0931     Iron 123 mcg/dL      Iron Saturation (TSAT) 35 %      Transferrin 235 mg/dL      TIBC 350 mcg/dL             PENDING RESULTS:     IMAGING REVIEWED:  XR Chest 1 View    Result Date: 10/3/2023  Impression: No active disease Electronically  Signed: Francis Shah MD  10/3/2023 5:42 AM EDT  Workstation ID: [id number]     Assessment & Plan   ASSESSMENT:    Leukocytosis with leukoerythroblastic changes up to 22% blast forms worrisome for myelo infiltrative process, AML  Thrombocytopenia likely secondary to primary bone marrow disease.  So far coagulation panel is normal.  We will add fibrinogen level  Patient is declining bone marrow aspiration and biopsy understanding that we have a high clinical suspicion for myelo infiltrative process such as leukemia.  Sinus tachycardia with Wenckebach second-degree AV block  Acute heart failure due to diastolic dysfunction  Acute kidney injury: Nephrology consult.  Possible tumor lysis syndrome  Hyperuricemia     PLANS:    Begin hydroxyurea 500 mg p.o. twice a day  Allopurinol 100 mg p.o. daily  We will send her peripheral blood for flow cytometry suspect that she has acute leukemia.  This is still pending  Bone marrow aspiration and biopsy  Check fibrinogen level, blood cultures urine cultures  Check uric acid level, LDH, reticulocyte count  Daily check tumor lysis labs as well  Will continue to follow          Electronically signed by Lynn Gutierrez MD, 10/04/23, 5:40 PM EDT.

## 2023-10-04 NOTE — PLAN OF CARE
Goal Outcome Evaluation:  Plan of Care Reviewed With: patient           Outcome Evaluation: 87 yo female admitted with heart palpitations.  Pt with anemia, MISSAEL and tachycardia.  Oncology is following for w/u of possible leukemia, needs bone marrow biopsy.  Pt is from home alone, normally independent, uses rollator outside of home and reports she walks 1/2 mile every morning.  Pt is eager to get OOB, reports feeling a little weak today.  Pt required CGA for in room mobility, ambualtes to bathroom and x 75' in reyes holding to handrails as needed.  Pt would benefit from RW to use while IP.  Will follow pt 3x/week with recommendation for home with HHPT at d/c.      Anticipated Discharge Disposition (PT): home with home health

## 2023-10-05 LAB
ALBUMIN SERPL-MCNC: 3.7 G/DL (ref 3.5–5.2)
ALBUMIN/GLOB SERPL: 1.7 G/DL
ALP SERPL-CCNC: 59 U/L (ref 39–117)
ALT SERPL W P-5'-P-CCNC: 13 U/L (ref 1–33)
ANION GAP SERPL CALCULATED.3IONS-SCNC: 13 MMOL/L (ref 5–15)
ANISOCYTOSIS BLD QL: ABNORMAL
AST SERPL-CCNC: 24 U/L (ref 1–32)
BH CV ECHO MEAS - ACS: 1.32 CM
BH CV ECHO MEAS - AO MAX PG: 10.6 MMHG
BH CV ECHO MEAS - AO MEAN PG: 6.3 MMHG
BH CV ECHO MEAS - AO ROOT DIAM: 2.8 CM
BH CV ECHO MEAS - AO V2 MAX: 162.5 CM/SEC
BH CV ECHO MEAS - AO V2 VTI: 34.1 CM
BH CV ECHO MEAS - AVA(I,D): 1.33 CM2
BH CV ECHO MEAS - EDV(CUBED): 34.9 ML
BH CV ECHO MEAS - EDV(MOD-SP4): 47.2 ML
BH CV ECHO MEAS - EF(MOD-BP): 66 %
BH CV ECHO MEAS - EF(MOD-SP4): 66 %
BH CV ECHO MEAS - ESV(CUBED): 5.8 ML
BH CV ECHO MEAS - ESV(MOD-SP4): 16.1 ML
BH CV ECHO MEAS - FS: 45 %
BH CV ECHO MEAS - IVS/LVPW: 0.97 CM
BH CV ECHO MEAS - IVSD: 0.94 CM
BH CV ECHO MEAS - LA DIMENSION: 2.5 CM
BH CV ECHO MEAS - LV DIASTOLIC VOL/BSA (35-75): 26.6 CM2
BH CV ECHO MEAS - LV MASS(C)D: 86.5 GRAMS
BH CV ECHO MEAS - LV MAX PG: 2.9 MMHG
BH CV ECHO MEAS - LV MEAN PG: 1.66 MMHG
BH CV ECHO MEAS - LV SYSTOLIC VOL/BSA (12-30): 9.1 CM2
BH CV ECHO MEAS - LV V1 MAX: 84.9 CM/SEC
BH CV ECHO MEAS - LV V1 VTI: 19 CM
BH CV ECHO MEAS - LVIDD: 3.3 CM
BH CV ECHO MEAS - LVIDS: 1.8 CM
BH CV ECHO MEAS - LVOT AREA: 2.39 CM2
BH CV ECHO MEAS - LVOT DIAM: 1.74 CM
BH CV ECHO MEAS - LVPWD: 0.97 CM
BH CV ECHO MEAS - MR MAX PG: 41.8 MMHG
BH CV ECHO MEAS - MR MAX VEL: 322.8 CM/SEC
BH CV ECHO MEAS - MV A MAX VEL: 38.2 CM/SEC
BH CV ECHO MEAS - MV DEC SLOPE: 909.7 CM/SEC2
BH CV ECHO MEAS - MV DEC TIME: 0.15 SEC
BH CV ECHO MEAS - MV E MAX VEL: 139.1 CM/SEC
BH CV ECHO MEAS - MV E/A: 3.6
BH CV ECHO MEAS - MV MAX PG: 10.7 MMHG
BH CV ECHO MEAS - MV MEAN PG: 3 MMHG
BH CV ECHO MEAS - MV V2 VTI: 39.8 CM
BH CV ECHO MEAS - MVA(VTI): 1.14 CM2
BH CV ECHO MEAS - PA ACC TIME: 0.11 SEC
BH CV ECHO MEAS - PA V2 MAX: 91.3 CM/SEC
BH CV ECHO MEAS - RAP SYSTOLE: 3 MMHG
BH CV ECHO MEAS - RV MAX PG: 2.44 MMHG
BH CV ECHO MEAS - RV V1 MAX: 78.2 CM/SEC
BH CV ECHO MEAS - RV V1 VTI: 15 CM
BH CV ECHO MEAS - RVDD: 3.3 CM
BH CV ECHO MEAS - RVSP: 20.7 MMHG
BH CV ECHO MEAS - SI(MOD-SP4): 17.6 ML/M2
BH CV ECHO MEAS - SV(LVOT): 45.4 ML
BH CV ECHO MEAS - SV(MOD-SP4): 31.1 ML
BH CV ECHO MEAS - TR MAX PG: 17.7 MMHG
BH CV ECHO MEAS - TR MAX VEL: 208.4 CM/SEC
BILIRUB SERPL-MCNC: 0.3 MG/DL (ref 0–1.2)
BLASTS NFR BLD MANUAL: 50 % (ref 0–0)
BUN SERPL-MCNC: 33 MG/DL (ref 8–23)
BUN/CREAT SERPL: 22 (ref 7–25)
CALCIUM SPEC-SCNC: 9.1 MG/DL (ref 8.6–10.5)
CHLORIDE SERPL-SCNC: 104 MMOL/L (ref 98–107)
CO2 SERPL-SCNC: 21 MMOL/L (ref 22–29)
CREAT SERPL-MCNC: 1.5 MG/DL (ref 0.57–1)
DACRYOCYTES BLD QL SMEAR: ABNORMAL
DEPRECATED RDW RBC AUTO: 87.1 FL (ref 37–54)
EGFRCR SERPLBLD CKD-EPI 2021: 33.8 ML/MIN/1.73
ERYTHROCYTE [DISTWIDTH] IN BLOOD BY AUTOMATED COUNT: 22.5 % (ref 12.3–15.4)
GLOBULIN UR ELPH-MCNC: 2.2 GM/DL
GLUCOSE SERPL-MCNC: 99 MG/DL (ref 65–99)
HCT VFR BLD AUTO: 24.4 % (ref 34–46.6)
HGB BLD-MCNC: 7.3 G/DL (ref 12–15.9)
LDH SERPL-CCNC: 878 U/L (ref 135–214)
LYMPHOCYTES # BLD MANUAL: 10.86 10*3/MM3 (ref 0.7–3.1)
LYMPHOCYTES NFR BLD MANUAL: 16 % (ref 5–12)
MAGNESIUM SERPL-MCNC: 2.2 MG/DL (ref 1.6–2.4)
MCH RBC QN AUTO: 33.1 PG (ref 26.6–33)
MCHC RBC AUTO-ENTMCNC: 30.1 G/DL (ref 31.5–35.7)
MCV RBC AUTO: 109.8 FL (ref 79–97)
METAMYELOCYTES NFR BLD MANUAL: 1 % (ref 0–0)
MONOCYTES # BLD: 14.48 10*3/MM3 (ref 0.1–0.9)
MYELOCYTES NFR BLD MANUAL: 2 % (ref 0–0)
NEUTROPHILS # BLD AUTO: 15.39 10*3/MM3 (ref 1.7–7)
NEUTROPHILS NFR BLD MANUAL: 11 % (ref 42.7–76)
NEUTS BAND NFR BLD MANUAL: 6 % (ref 0–5)
PHOSPHATE SERPL-MCNC: 4.4 MG/DL (ref 2.5–4.5)
PLATELET # BLD AUTO: 65 10*3/MM3 (ref 140–450)
PMV BLD AUTO: 10 FL (ref 6–12)
POIKILOCYTOSIS BLD QL SMEAR: ABNORMAL
POTASSIUM SERPL-SCNC: 4 MMOL/L (ref 3.5–5.2)
PROMYELOCYTES NFR BLD MANUAL: 2 % (ref 0–0)
PROT SERPL-MCNC: 5.9 G/DL (ref 6–8.5)
RBC # BLD AUTO: 2.22 10*6/MM3 (ref 3.77–5.28)
SCAN SLIDE: NORMAL
SMALL PLATELETS BLD QL SMEAR: ABNORMAL
SODIUM SERPL-SCNC: 138 MMOL/L (ref 136–145)
URATE SERPL-MCNC: 13.7 MG/DL (ref 2.4–5.7)
VARIANT LYMPHS NFR BLD MANUAL: 12 % (ref 19.6–45.3)
WBC MORPH BLD: NORMAL
WBC NRBC COR # BLD: 90.5 10*3/MM3 (ref 3.4–10.8)

## 2023-10-05 PROCEDURE — 93005 ELECTROCARDIOGRAM TRACING: CPT | Performed by: NURSE PRACTITIONER

## 2023-10-05 PROCEDURE — 85025 COMPLETE CBC W/AUTO DIFF WBC: CPT | Performed by: INTERNAL MEDICINE

## 2023-10-05 PROCEDURE — 99221 1ST HOSP IP/OBS SF/LOW 40: CPT | Performed by: NURSE PRACTITIONER

## 2023-10-05 PROCEDURE — 83735 ASSAY OF MAGNESIUM: CPT | Performed by: INTERNAL MEDICINE

## 2023-10-05 PROCEDURE — 99497 ADVNCD CARE PLAN 30 MIN: CPT | Performed by: NURSE PRACTITIONER

## 2023-10-05 PROCEDURE — 85007 BL SMEAR W/DIFF WBC COUNT: CPT | Performed by: INTERNAL MEDICINE

## 2023-10-05 PROCEDURE — 84550 ASSAY OF BLOOD/URIC ACID: CPT | Performed by: INTERNAL MEDICINE

## 2023-10-05 PROCEDURE — 99232 SBSQ HOSP IP/OBS MODERATE 35: CPT | Performed by: INTERNAL MEDICINE

## 2023-10-05 PROCEDURE — 83615 LACTATE (LD) (LDH) ENZYME: CPT | Performed by: INTERNAL MEDICINE

## 2023-10-05 PROCEDURE — 97110 THERAPEUTIC EXERCISES: CPT

## 2023-10-05 PROCEDURE — 80053 COMPREHEN METABOLIC PANEL: CPT | Performed by: INTERNAL MEDICINE

## 2023-10-05 PROCEDURE — 84100 ASSAY OF PHOSPHORUS: CPT | Performed by: INTERNAL MEDICINE

## 2023-10-05 PROCEDURE — 93010 ELECTROCARDIOGRAM REPORT: CPT | Performed by: STUDENT IN AN ORGANIZED HEALTH CARE EDUCATION/TRAINING PROGRAM

## 2023-10-05 PROCEDURE — 97116 GAIT TRAINING THERAPY: CPT

## 2023-10-05 RX ORDER — PREGABALIN 75 MG/1
75 CAPSULE ORAL DAILY
Status: DISCONTINUED | OUTPATIENT
Start: 2023-10-05 | End: 2023-10-09 | Stop reason: HOSPADM

## 2023-10-05 RX ORDER — HYDROXYUREA 500 MG/1
1000 CAPSULE ORAL 2 TIMES DAILY
Status: DISCONTINUED | OUTPATIENT
Start: 2023-10-05 | End: 2023-10-09 | Stop reason: HOSPADM

## 2023-10-05 RX ORDER — ALLOPURINOL 100 MG/1
200 TABLET ORAL DAILY
Status: DISCONTINUED | OUTPATIENT
Start: 2023-10-05 | End: 2023-10-09 | Stop reason: HOSPADM

## 2023-10-05 RX ORDER — CHOLECALCIFEROL (VITAMIN D3) 125 MCG
5 CAPSULE ORAL NIGHTLY
Status: DISCONTINUED | OUTPATIENT
Start: 2023-10-05 | End: 2023-10-09 | Stop reason: HOSPADM

## 2023-10-05 RX ADMIN — HYDROXYUREA 1000 MG: 500 CAPSULE ORAL at 20:14

## 2023-10-05 RX ADMIN — ATORVASTATIN CALCIUM 20 MG: 20 TABLET, FILM COATED ORAL at 07:52

## 2023-10-05 RX ADMIN — HYDROXYUREA 500 MG: 500 CAPSULE ORAL at 07:52

## 2023-10-05 RX ADMIN — Medication 5 MG: at 20:14

## 2023-10-05 RX ADMIN — FERROUS SULFATE TAB EC 324 MG (65 MG FE EQUIVALENT) 324 MG: 324 (65 FE) TABLET DELAYED RESPONSE at 07:52

## 2023-10-05 RX ADMIN — Medication 10 ML: at 07:50

## 2023-10-05 RX ADMIN — ALLOPURINOL 200 MG: 100 TABLET ORAL at 09:29

## 2023-10-05 RX ADMIN — CETIRIZINE HYDROCHLORIDE 10 MG: 10 TABLET, FILM COATED ORAL at 07:52

## 2023-10-05 RX ADMIN — ALLOPURINOL 100 MG: 100 TABLET ORAL at 07:53

## 2023-10-05 RX ADMIN — PANTOPRAZOLE SODIUM 40 MG: 40 TABLET, DELAYED RELEASE ORAL at 07:52

## 2023-10-05 RX ADMIN — PREGABALIN 75 MG: 75 CAPSULE ORAL at 10:48

## 2023-10-05 RX ADMIN — CARVEDILOL 12.5 MG: 6.25 TABLET, FILM COATED ORAL at 16:51

## 2023-10-05 RX ADMIN — Medication 10 ML: at 20:14

## 2023-10-05 NOTE — PROGRESS NOTES
Hematology/Oncology Inpatient Progress Note    PATIENT NAME: Tricia Marroquin  : 1935  MRN: [id number]    CHIEF COMPLAINT: shortness of breath    HISTORY OF PRESENT ILLNESS:      Tricia Marroquin is a 88 y.o. female who presented to [facility] on 10/3/2023 with complaints of difficulty breathing and irregular heartbeats.  Patient had denied chest pain but has had worsening orthopnea.  She had some back stools as well prior to presentation to the hospital.  In the emergency room she was found to have A-fib elevated proBNP, significant leukocytosis and thrombocytopenia.  She also was found to have acute kidney injury with a creatinine of 1.6.  Review of her CBC showed a white count of 68,000, hemoglobin 7.8, MCV was 106 and platelets of 58,000 with evidence of leukoerythroblastic changes, up to 22% blast forms, promyelocytes myelocytes.  PT was normal at 11, INR is 1 PTT was 26.7 and magnesium was normal at 2.4.  She has a normal calcium and iron studies were unremarkable.     Her peripheral smear also concurs with the above blast forms     10/03/23  Hematology/Oncology was consulted number cytopenia.  Patient was seen by Dr. Paez back in .  Her thrombocytopenia was dating back to  with platelet counts ranging between 57 and 10 4000 mild anemia and leukopenia.  Patient was recommended to follow-up in the outpatient setting for possible bone marrow biopsy.     He/She  has a past medical history of Hypertension and Seasonal allergies.     PCP: Danish Herbert MD  Subjective     Patient denies any new issues this morning.  Family at her bedside        ROS:  Review of Systems   Constitutional:  Positive for fatigue.   Neurological:  Positive for weakness.      MEDICATIONS:    Scheduled Meds:  allopurinol, 100 mg, Oral, Daily  atorvastatin, 20 mg, Oral, Daily  carvedilol, 12.5 mg, Oral, BID With Meals  cetirizine, 10 mg, Oral, Daily  ferrous sulfate, 324 mg, Oral, Daily With  "Breakfast  hydroxyurea, 500 mg, Oral, BID  pantoprazole, 40 mg, Oral, Daily  senna-docusate sodium, 2 tablet, Oral, BID  sodium chloride, 10 mL, Intravenous, Q12H       Continuous Infusions:        PRN Meds:    acetaminophen    senna-docusate sodium **AND** polyethylene glycol **AND** bisacodyl **AND** bisacodyl    melatonin    nitroglycerin    [COMPLETED] Insert Peripheral IV **AND** sodium chloride    sodium chloride    sodium chloride     ALLERGIES:    Allergies   Allergen Reactions    Aspirin Other (See Comments)     Pt reports \"makes my heart go crazy\"    Codeine GI Intolerance    Penicillins Hives    Lisinopril Cough       Objective    VITALS:   /43 (BP Location: Left arm, Patient Position: Lying)   Pulse 71   Temp 98.2 øF (36.8 øC) (Oral)   Resp 14   Ht 154.9 cm (61\")   Wt 77.4 kg (170 lb 9.6 oz)   SpO2 96%   BMI 32.23 kg/mý     PHYSICAL EXAM: (performed by MD)  Physical Exam  Constitutional:       Appearance: She is ill-appearing.   HENT:      Right Ear: External ear normal.      Left Ear: External ear normal.   Pulmonary:      Effort: Pulmonary effort is normal.   Neurological:      General: No focal deficit present.      Mental Status: She is oriented to person, place, and time. Mental status is at baseline.   Psychiatric:         Mood and Affect: Mood normal.         Behavior: Behavior normal.         Thought Content: Thought content normal.         Judgment: Judgment normal.         I have reexamined the patient and the results are consistent with the previously documented exam. Lynn Gutierrez MD      RECENT LABS:    Lab Results (last 24 hours)       Procedure Component Value Units Date/Time    Blood Culture - Blood, Arm, Right [[id number]]  (Normal) Collected: 10/03/23 0724    Specimen: Blood from Arm, Right Updated: 10/05/23 0730     Blood Culture No growth at 2 days    Blood Culture - Blood, Wrist, Left [[id number]]  (Normal) Collected: 10/03/23 0700    Specimen: Blood from Wrist, " Left Updated: 10/05/23 0715     Blood Culture No growth at 2 days    Narrative:      Less than seven (7) mL's of blood was collected.  Insufficient quantity may yield false negative results.    Manual Differential [[id number]]  (Abnormal) Collected: 10/05/23 0146    Specimen: Blood Updated: 10/05/23 0406     Neutrophil % 11.0 %      Lymphocyte % 12.0 %      Monocyte % 16.0 %      Bands %  6.0 %      Metamyelocyte % 1.0 %      Myelocyte % 2.0 %      Promyelocyte % 2.0 %      Blasts % 50.0 %      Neutrophils Absolute 15.39 10*3/mm3      Lymphocytes Absolute 10.86 10*3/mm3      Monocytes Absolute 14.48 10*3/mm3      Anisocytosis Slight/1+     Dacrocytes Slight/1+     Poikilocytes Slight/1+     WBC Morphology Normal     Platelet Estimate Decreased    Narrative:      Reviewed by Pathologist within the past 30 days on [id number].      CBC & Differential [[id number]]  (Abnormal) Collected: 10/05/23 0146    Specimen: Blood Updated: 10/05/23 0406    Narrative:      The following orders were created for panel order CBC & Differential.  Procedure                               Abnormality         Status                     ---------                               -----------         ------                     CBC Auto Differential[[id number]]        Abnormal            Final result               Scan Slide[[id number]]                                       Final result                 Please view results for these tests on the individual orders.    Scan Slide [[id number]] Collected: 10/05/23 0146    Specimen: Blood Updated: 10/05/23 0406     Scan Slide --     Comment: See Manual Differential Results       CBC Auto Differential [[id number]]  (Abnormal) Collected: 10/05/23 0146    Specimen: Blood Updated: 10/05/23 0406     WBC 90.50 10*3/mm3      RBC 2.22 10*6/mm3      Hemoglobin 7.3 g/dL      Hematocrit 24.4 %      .8 fL      MCH 33.1 pg      MCHC 30.1 g/dL      RDW 22.5 %      RDW-SD 87.1 fl      MPV 10.0 fL      Platelets 65  10*3/mm3     Narrative:      The previously reported component NRBC is no longer being reported. Previous result was 0.2 /100 WBC (Reference Range: 0.0-0.2 /100 WBC) on 10/5/2023 at 0246 EDT.    Magnesium [[id number]]  (Normal) Collected: 10/05/23 0146    Specimen: Blood Updated: 10/05/23 0301     Magnesium 2.2 mg/dL     Comprehensive Metabolic Panel [[id number]]  (Abnormal) Collected: 10/05/23 0146    Specimen: Blood Updated: 10/05/23 0301     Glucose 99 mg/dL      BUN 33 mg/dL      Creatinine 1.50 mg/dL      Sodium 138 mmol/L      Potassium 4.0 mmol/L      Chloride 104 mmol/L      CO2 21.0 mmol/L      Calcium 9.1 mg/dL      Total Protein 5.9 g/dL      Albumin 3.7 g/dL      ALT (SGPT) 13 U/L      AST (SGOT) 24 U/L      Alkaline Phosphatase 59 U/L      Total Bilirubin 0.3 mg/dL      Globulin 2.2 gm/dL      A/G Ratio 1.7 g/dL      BUN/Creatinine Ratio 22.0     Anion Gap 13.0 mmol/L      eGFR 33.8 mL/min/1.73     Narrative:      GFR Normal >60  Chronic Kidney Disease <60  Kidney Failure <15    The GFR formula is only valid for adults with stable renal function between ages 18 and 70.    Uric Acid [[id number]]  (Abnormal) Collected: 10/05/23 0146    Specimen: Blood Updated: 10/05/23 0301     Uric Acid 13.7 mg/dL     Phosphorus [[id number]]  (Normal) Collected: 10/05/23 0146    Specimen: Blood Updated: 10/05/23 0301     Phosphorus 4.4 mg/dL     Lactate Dehydrogenase [[id number]]  (Abnormal) Collected: 10/05/23 0146    Specimen: Blood Updated: 10/05/23 0301      U/L     Blood Culture - Blood, Wrist, Left [[id number]]  (Normal) Collected: 10/03/23 2041    Specimen: Blood from Wrist, Left Updated: 10/04/23 2100     Blood Culture No growth at 24 hours    Lactate Dehydrogenase [[id number]]  (Abnormal) Collected: 10/04/23 0813    Specimen: Blood Updated: 10/04/23 1110      U/L      Comment: Specimen hemolyzed.  Results may be affected.       T4, Free [[id number]]  (Normal) Collected: 10/04/23 0813    Specimen: Blood  Updated: 10/04/23 1109     Free T4 1.14 ng/dL     Narrative:      Results may be falsely increased if patient taking Biotin.      Uric Acid [[id number]]  (Abnormal) Collected: 10/04/23 0813    Specimen: Blood Updated: 10/04/23 1107     Uric Acid 14.4 mg/dL     CBC & Differential [[id number]]  (Abnormal) Collected: 10/04/23 0814    Specimen: Blood Updated: 10/04/23 0921    Narrative:      The following orders were created for panel order CBC & Differential.  Procedure                               Abnormality         Status                     ---------                               -----------         ------                     CBC Auto Differential[[id number]]        Abnormal            Final result               Scan Slide[[id number]]                                       Final result                 Please view results for these tests on the individual orders.    Scan Slide [[id number]] Collected: 10/04/23 0814    Specimen: Blood Updated: 10/04/23 0921     Scan Slide --     Comment: See Manual Differential Results       Manual Differential [[id number]]  (Abnormal) Collected: 10/04/23 0814    Specimen: Blood Updated: 10/04/23 0921     Neutrophil % 12.0 %      Lymphocyte % 10.0 %      Monocyte % 20.0 %      Eosinophil % 1.0 %      Bands %  6.0 %      Metamyelocyte % 3.0 %      Myelocyte % 3.0 %      Blasts % 41.0 %      Prolymphocyte % 4.0 %      Neutrophils Absolute 14.94 10*3/mm3      Lymphocytes Absolute 8.30 10*3/mm3      Monocytes Absolute 16.60 10*3/mm3      Eosinophils Absolute 0.83 10*3/mm3      Anisocytosis Slight/1+     Dacrocytes Slight/1+     Poikilocytes Slight/1+     WBC Morphology Normal     Platelet Morphology Normal    Narrative:      Reviewed by Pathologist within the past 30 days on 10/03/23 .     CBC Auto Differential [[id number]]  (Abnormal) Collected: 10/04/23 0814    Specimen: Blood Updated: 10/04/23 0921     WBC 83.00 10*3/mm3      RBC 2.28 10*6/mm3      Hemoglobin 7.5 g/dL      Hematocrit 24.3 %       .6 fL      MCH 32.7 pg      MCHC 30.7 g/dL      RDW 21.8 %      RDW-SD 85.8 fl      MPV 10.3 fL      Platelets 65 10*3/mm3     Narrative:      The previously reported component NRBC is no longer being reported. Previous result was 0.1 /100 WBC (Reference Range: 0.0-0.2 /100 WBC) on 10/4/2023 at 0844 EDT.    Comprehensive Metabolic Panel [[id number]]  (Abnormal) Collected: 10/04/23 0813    Specimen: Blood Updated: 10/04/23 0852     Glucose 114 mg/dL      BUN 36 mg/dL      Creatinine 1.67 mg/dL      Sodium 141 mmol/L      Potassium 4.3 mmol/L      Chloride 106 mmol/L      CO2 23.0 mmol/L      Calcium 9.3 mg/dL      Total Protein 6.4 g/dL      Albumin 3.9 g/dL      ALT (SGPT) 10 U/L      AST (SGOT) 22 U/L      Alkaline Phosphatase 71 U/L      Total Bilirubin 0.4 mg/dL      Globulin 2.5 gm/dL      A/G Ratio 1.6 g/dL      BUN/Creatinine Ratio 21.6     Anion Gap 12.0 mmol/L      eGFR 29.7 mL/min/1.73     Narrative:      GFR Normal >60  Chronic Kidney Disease <60  Kidney Failure <15    The GFR formula is only valid for adults with stable renal function between ages 18 and 70.    Magnesium [[id number]]  (Normal) Collected: 10/04/23 0813    Specimen: Blood Updated: 10/04/23 0852     Magnesium 2.3 mg/dL     Phosphorus [[id number]]  (Normal) Collected: 10/04/23 0813    Specimen: Blood Updated: 10/04/23 0852     Phosphorus 4.2 mg/dL             PENDING RESULTS:     IMAGING REVIEWED:  US Renal Bilateral    Result Date: 10/4/2023  Impression: 1. Increased echogenicity in both kidneys suggesting medical renal disease. 2. No evidence of obstructive uropathy. 3. 1.9 cm left renal cyst. Electronically Signed: Deepak Pineda MD  10/4/2023 11:31 AM EDT  Workstation ID: [id number]     Assessment & Plan   ASSESSMENT:    Leukocytosis with leukoerythroblastic changes up to 22% blast forms worrisome for myelo infiltrative process, AML.  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  So far coagulation panel is normal.  We will add fibrinogen level  Patient is declining bone marrow aspiration and biopsy understanding that we have a high clinical suspicion for myelo infiltrative process such as leukemia.  Sinus tachycardia with Wenckebach second-degree AV block  Acute heart failure due to diastolic dysfunction  Acute kidney injury: Nephrology consult.  Possible tumor lysis syndrome.  Creatinine has slightly improved  Hyperuricemia     PLANS:    Increase hydroxyurea 1 g p.o. twice a day for now  Allopurinol changed to 200 mg p.o. daily  We will send her peripheral blood for flow cytometry suspect that she has acute leukemia.  This is still pending  Bone marrow aspiration and biopsy  Checked fibrinogen level, blood cultures urine cultures  Check uric acid level, LDH, reticulocyte count  Daily check tumor lysis labs as well  Will continue to follow              Electronically signed by Lynn Gutierrez MD, 10/05/23, 5:34 PM EDT.

## 2023-10-05 NOTE — CONSULTS
Palliative Care Consultation    Patient Name: Tricia Marroquin  : 1935  MRN: [id number]  Allergies: Aspirin, Codeine, Penicillins, and Lisinopril    Requesting clinician:  Kailey  Reason for consult: Consultation for clarification of goals of care and code status.      Patient Code Status:   Code Status and Medical Interventions:   Ordered at: 10/04/23 1633     Medical Intervention Limits:    NO intubation (DNI)     Level Of Support Discussed With:    Patient     Code Status (Patient has no pulse and is not breathing):    No CPR (Do Not Attempt to Resuscitate)     Medical Interventions (Patient has pulse or is breathing):    Limited Support           Chief Complaint:    Racing heart rate    History of Present Illness    Tricia Marroquin is a 88 y.o. female who presented to [facility] ED on 10/3 with reports of racing heart rate. He stated symptoms had been ongoing for a couple of hours prior to presentation. She also reported shortness of breath, dizziness, and fatigue. Those symptoms had been ongoing for about a week. She reported dark stools which she believes is related to her iron supplements. She denied any fevers, chills, nausea, vomiting, or diarrhea.     In ED: proBNP 2457, BUN 37, Glucose 110, TSH 6.01, WBC 68, Hgb 7.8, Platelets 58    Echo ordered. Cardiology consulted. Hem onc consulted for elevated WBC. Cultures obtained. GI consulted for melena. Started on protonix.     Circulating blasts noted on peripheral smear. Concern for AML. Patient apprehensive to have bone marrow biopsy.     10/5 Palliative consult for goals of care discussion for a patient with possible new cancer.       VITAL SIGNS:   Temp:  [97.4 øF (36.3 øC)-98.2 øF (36.8 øC)] 98.2 øF (36.8 øC)  Heart Rate:  [] 71  Resp:  [14-25] 14  BP: ()/(31-91) 113/43       PMH: MI, CAD, HTN     Past Surgical History:   Procedure Laterality Date    AORTA SURGERY      BACK SURGERY      CARDIAC CATHETERIZATION N/A 2022    Procedure: Left  Heart Cath;  Surgeon: Rodriguez Glass MD;  Location: [facility] CATH INVASIVE LOCATION;  Service: Cardiovascular;  Laterality: N/A;    CORONARY ANGIOPLASTY WITH STENT PLACEMENT  2022       Family History   Problem Relation Age of Onset    Heart disease Mother     Heart disease Father     Heart attack Father     Lung cancer Brother         associated to intense cigarette smoking       Social History     Tobacco Use    Smoking status: Former     Packs/day: 2.00     Years: 40.00     Pack years: 80.00     Types: Cigarettes     Start date:      Quit date:      Years since quittin.7    Smokeless tobacco: Never   Vaping Use    Vaping Use: Never used   Substance Use Topics    Alcohol use: Never    Drug use: Never           LABS:    Results from last 7 days   Lab Units 10/05/23  0146   WBC 10*3/mm3 90.50*   HEMOGLOBIN g/dL 7.3*   HEMATOCRIT % 24.4*   PLATELETS 10*3/mm3 65*     Results from last 7 days   Lab Units 10/05/23  0146   SODIUM mmol/L 138   POTASSIUM mmol/L 4.0   CHLORIDE mmol/L 104   CO2 mmol/L 21.0*   BUN mg/dL 33*   CREATININE mg/dL 1.50*   GLUCOSE mg/dL 99   CALCIUM mg/dL 9.1     Results from last 7 days   Lab Units 10/05/23  0146   SODIUM mmol/L 138   POTASSIUM mmol/L 4.0   CHLORIDE mmol/L 104   CO2 mmol/L 21.0*   BUN mg/dL 33*   CREATININE mg/dL 1.50*   CALCIUM mg/dL 9.1   BILIRUBIN mg/dL 0.3   ALK PHOS U/L 59   ALT (SGPT) U/L 13   AST (SGOT) U/L 24   GLUCOSE mg/dL 99         IMAGING STUDIES:  US Renal Bilateral    Result Date: 10/4/2023  Impression: 1. Increased echogenicity in both kidneys suggesting medical renal disease. 2. No evidence of obstructive uropathy. 3. 1.9 cm left renal cyst. Electronically Signed: Deepak Pineda MD  10/4/2023 11:31 AM EDT  Workstation ID: [id number]       I reviewed the patient's new clinical results including labs, imaging, and vitals.        Scheduled Meds:  allopurinol, 200 mg, Oral, Daily  atorvastatin, 20 mg, Oral, Daily  carvedilol, 12.5 mg, Oral,  BID With Meals  cetirizine, 10 mg, Oral, Daily  ferrous sulfate, 324 mg, Oral, Daily With Breakfast  hydroxyurea, 1,000 mg, Oral, BID  pantoprazole, 40 mg, Oral, Daily  senna-docusate sodium, 2 tablet, Oral, BID  sodium chloride, 10 mL, Intravenous, Q12H      Continuous Infusions:       I have reviewed patient's current medication list.     Review of Systems   Constitutional:  Positive for fatigue.   Respiratory:  Positive for shortness of breath.    All other systems reviewed and are negative.      Physical Exam  Vitals and nursing note reviewed.   HENT:      Head: Normocephalic and atraumatic.      Nose: Nose normal.      Mouth/Throat:      Mouth: Mucous membranes are dry.      Pharynx: Oropharynx is clear.   Eyes:      Extraocular Movements: Extraocular movements intact.      Conjunctiva/sclera: Conjunctivae normal.      Pupils: Pupils are equal, round, and reactive to light.   Cardiovascular:      Rate and Rhythm: Normal rate.      Pulses: Normal pulses.   Pulmonary:      Effort: Pulmonary effort is normal.   Abdominal:      Palpations: Abdomen is soft.   Musculoskeletal:         General: Normal range of motion.      Cervical back: Normal range of motion.   Skin:     General: Skin is dry.   Neurological:      Mental Status: She is alert and oriented to person, place, and time.           PROBLEM LIST:    Dyspnea    Shortness of breath    Coronary artery disease involving native coronary artery of native heart with unstable angina pectoris    Essential hypertension    Dyslipidemia    Thrombocytopenia    Dizziness    History of MI (myocardial infarction)    Leukocytosis    Symptomatic anemia          ASSESSMENT/PLAN:    CHF: exacerbation. With elevated proBNP. Chest x-ray with no acute issues. Echo ordered. Cardiology consulted.     Afib: requiring IV cardizem. Cardiology following.     Leukocytosis: WBC 68, Hgb 7.8. Hem onc consulted. Cultures obtained. Circulating blast noted on peripheral smear. Concern for AML.      Melena: with possible GI bleed. GI consulted. Started on IV protonix.     HTN/CAD/MI/HLD: chronic conditions per primary.     These illnesses and their management contribute to the need for a palliative consult and advanced care planning.      ADVANCED CARE PLANNING:     10/5 Met with patient and daughter at bedside this am. Patient is awake and alert. We discussed her current clinical status and overall goals of care. Patient tells me that she has been undecided about proceeding with bone marrow biopsy. She states that her son was being worked up for leukemia years ago, had the biopsy, and told her it was the worst thing he had ever experienced. She states that even if she did get the bone marrow, and end up with cancer, she is not sure she would want to pursue treatment. She affirms that she is a DNR/DNI. She asks that I place a referral to [facility] for an explanation of services. This information was shared with case management.       Advanced Directives: Patient has advance directive, copy requested  Health Care Directive on file: No  Health Care Surrogate:      Palliative Performance Scale Score:    Comments:           Decisional Capacity: yes  Patient's understanding of illness: adequate  Patient goals of care:  DNR/DNI      Thank you for this consult and allowing us to participate in patient's plan of care. Palliative Care Team will continue to follow patient.       I spent 49 minutes reviewing providers documentation, medication records, assessing and examining patient, discussing with family, answering questions, providing guidance about a plan of care, and coordinating care with other healthcare members. More than 50% of time spent face to face discussing disease education, current clinical status, and medication management.     I spent an additional 19 minutes on advanced care planning, goals of care, and code status discussion.  I obtained consent for ACP discussion.     Mandie Biggs,  APRN  10/5/2023

## 2023-10-05 NOTE — PROGRESS NOTES
"                                                                                                                                      Nephrology  Progress Note                                        [facility]    Patient Identification    Name: Tricia Marroquin  Age: 88 y.o.  Sex: female  :  1935  MRN: [id number]      DATE OF SERVICE:  10/5/2023        Subective    Feeling better  Off Cardizem drip     Objective   Scheduled Meds:allopurinol, 100 mg, Oral, Daily  atorvastatin, 20 mg, Oral, Daily  carvedilol, 12.5 mg, Oral, BID With Meals  cetirizine, 10 mg, Oral, Daily  ferrous sulfate, 324 mg, Oral, Daily With Breakfast  hydroxyurea, 500 mg, Oral, BID  pantoprazole, 40 mg, Oral, Daily  senna-docusate sodium, 2 tablet, Oral, BID  sodium chloride, 10 mL, Intravenous, Q12H          Continuous Infusions:     PRN Meds:  acetaminophen    senna-docusate sodium **AND** polyethylene glycol **AND** bisacodyl **AND** bisacodyl    melatonin    nitroglycerin    [COMPLETED] Insert Peripheral IV **AND** sodium chloride    sodium chloride    sodium chloride     Exam:  /59 (BP Location: Left arm, Patient Position: Lying)   Pulse 62   Temp 97.7 øF (36.5 øC) (Oral)   Resp 17   Ht 154.9 cm (61\")   Wt 77.4 kg (170 lb 9.6 oz)   SpO2 97%   BMI 32.23 kg/mý     Intake/Output last 3 shifts:  I/O last 3 completed shifts:  In: 1060 [P.O.:960; IV Piggyback:100]  Out: 600 [Urine:600]    Intake/Output this shift:  I/O this shift:  In: 270 [P.O.:270]  Out: -     Physical exam:  General Appearance:  Alert  Head:  Normocephalic, without obvious abnormality, atraumatic  Eyes:  PERRL, conjunctiva/corneas clear     Neck:  Supple,  no adenopathy;      Lungs:  Decreased BS occasion ronchi  Heart:  Regular rate and rhythm, S1 and S2 normal  Abdomen:  Soft, non-tender, bowel sounds active   Extremities: trace edema  Pulses: 2+ and symmetric all extremities  Skin:  No rashes or lesions       Data Review:  All labs " (24hrs):   Recent Results (from the past 24 hour(s))   T4, Free    Collection Time: 10/04/23  8:13 AM    Specimen: Blood   Result Value Ref Range    Free T4 1.14 0.93 - 1.70 ng/dL   Comprehensive Metabolic Panel    Collection Time: 10/04/23  8:13 AM    Specimen: Blood   Result Value Ref Range    Glucose 114 (H) 65 - 99 mg/dL    BUN 36 (H) 8 - 23 mg/dL    Creatinine 1.67 (H) 0.57 - 1.00 mg/dL    Sodium 141 136 - 145 mmol/L    Potassium 4.3 3.5 - 5.2 mmol/L    Chloride 106 98 - 107 mmol/L    CO2 23.0 22.0 - 29.0 mmol/L    Calcium 9.3 8.6 - 10.5 mg/dL    Total Protein 6.4 6.0 - 8.5 g/dL    Albumin 3.9 3.5 - 5.2 g/dL    ALT (SGPT) 10 1 - 33 U/L    AST (SGOT) 22 1 - 32 U/L    Alkaline Phosphatase 71 39 - 117 U/L    Total Bilirubin 0.4 0.0 - 1.2 mg/dL    Globulin 2.5 gm/dL    A/G Ratio 1.6 g/dL    BUN/Creatinine Ratio 21.6 7.0 - 25.0    Anion Gap 12.0 5.0 - 15.0 mmol/L    eGFR 29.7 (L) >60.0 mL/min/1.73   Uric Acid    Collection Time: 10/04/23  8:13 AM    Specimen: Blood   Result Value Ref Range    Uric Acid 14.4 (H) 2.4 - 5.7 mg/dL   Lactate Dehydrogenase    Collection Time: 10/04/23  8:13 AM    Specimen: Blood   Result Value Ref Range     (H) 135 - 214 U/L   Phosphorus    Collection Time: 10/04/23  8:13 AM    Specimen: Blood   Result Value Ref Range    Phosphorus 4.2 2.5 - 4.5 mg/dL   Magnesium    Collection Time: 10/04/23  8:13 AM    Specimen: Blood   Result Value Ref Range    Magnesium 2.3 1.6 - 2.4 mg/dL   CBC Auto Differential    Collection Time: 10/04/23  8:14 AM    Specimen: Blood   Result Value Ref Range    WBC 83.00 (C) 3.40 - 10.80 10*3/mm3    RBC 2.28 (L) 3.77 - 5.28 10*6/mm3    Hemoglobin 7.5 (L) 12.0 - 15.9 g/dL    Hematocrit 24.3 (L) 34.0 - 46.6 %    .6 (H) 79.0 - 97.0 fL    MCH 32.7 26.6 - 33.0 pg    MCHC 30.7 (L) 31.5 - 35.7 g/dL    RDW 21.8 (H) 12.3 - 15.4 %    RDW-SD 85.8 (H) 37.0 - 54.0 fl    MPV 10.3 6.0 - 12.0 fL    Platelets 65 (L) 140 - 450 10*3/mm3   Scan Slide    Collection Time:  10/04/23  8:14 AM    Specimen: Blood   Result Value Ref Range    Scan Slide     Manual Differential    Collection Time: 10/04/23  8:14 AM    Specimen: Blood   Result Value Ref Range    Neutrophil % 12.0 (L) 42.7 - 76.0 %    Lymphocyte % 10.0 (L) 19.6 - 45.3 %    Monocyte % 20.0 (H) 5.0 - 12.0 %    Eosinophil % 1.0 0.3 - 6.2 %    Bands %  6.0 (H) 0.0 - 5.0 %    Metamyelocyte % 3.0 (H) 0.0 - 0.0 %    Myelocyte % 3.0 (H) 0.0 - 0.0 %    Blasts % 41.0 (H) 0.0 - 0.0 %    Prolymphocyte % 4.0 (H) 0.0 - 0.0 %    Neutrophils Absolute 14.94 (H) 1.70 - 7.00 10*3/mm3    Lymphocytes Absolute 8.30 (H) 0.70 - 3.10 10*3/mm3    Monocytes Absolute 16.60 (H) 0.10 - 0.90 10*3/mm3    Eosinophils Absolute 0.83 (H) 0.00 - 0.40 10*3/mm3    Anisocytosis Slight/1+ None Seen    Dacrocytes Slight/1+ None Seen    Poikilocytes Slight/1+ None Seen    WBC Morphology Normal Normal    Platelet Morphology Normal Normal   Adult Transthoracic Echo Complete w/ Color, Spectral and Contrast if necessary per protocol    Collection Time: 10/04/23  9:04 AM   Result Value Ref Range    LVIDd 3.3 cm    LVIDs 1.80 cm    IVSd 0.94 cm    LVPWd 0.97 cm    FS 45.0 %    IVS/LVPW 0.97 cm    ESV(cubed) 5.8 ml    LV Sys Vol (BSA corrected) 9.1 cm2    EDV(cubed) 34.9 ml    LV Mahmood Vol (BSA corrected) 26.6 cm2    LVOT area 2.39 cm2    LV mass(C)d 86.5 grams    LVOT diam 1.74 cm    EDV(MOD-sp4) 47.2 ml    ESV(MOD-sp4) 16.1 ml    SV(MOD-sp4) 31.1 ml    SI(MOD-sp4) 17.6 ml/m2    EF(MOD-sp4) 66.0 %    MV E max alvina 139.1 cm/sec    MV A max alvina 38.2 cm/sec    MV dec time 0.15 sec    MV E/A 3.6     SV(LVOT) 45.4 ml    RVIDd 3.3 cm    LV V1 max 84.9 cm/sec    LV V1 max PG 2.9 mmHg    LV V1 mean PG 1.66 mmHg    LV V1 VTI 19.0 cm    Ao pk alvina 162.5 cm/sec    Ao max PG 10.6 mmHg    Ao mean PG 6.3 mmHg    Ao V2 VTI 34.1 cm    KELSEY(I,D) 1.33 cm2    MV max PG 10.7 mmHg    MV mean PG 3.0 mmHg    MV V2 VTI 39.8 cm    MVA(VTI) 1.14 cm2    MV dec slope 909.7 cm/sec2    MR max alvina 322.8  cm/sec    MR max PG 41.8 mmHg    TR max alvina 208.4 cm/sec    TR max PG 17.7 mmHg    RVSP(TR) 20.7 mmHg    RAP systole 3.0 mmHg    RV V1 max PG 2.44 mmHg    RV V1 max 78.2 cm/sec    RV V1 VTI 15.0 cm    PA V2 max 91.3 cm/sec    PA acc time 0.11 sec    Ao root diam 2.8 cm    ACS 1.32 cm    EF(MOD-bp) 66.0 %    LA dimension (2D)  2.5 cm   Comprehensive Metabolic Panel    Collection Time: 10/05/23  1:46 AM    Specimen: Blood   Result Value Ref Range    Glucose 99 65 - 99 mg/dL    BUN 33 (H) 8 - 23 mg/dL    Creatinine 1.50 (H) 0.57 - 1.00 mg/dL    Sodium 138 136 - 145 mmol/L    Potassium 4.0 3.5 - 5.2 mmol/L    Chloride 104 98 - 107 mmol/L    CO2 21.0 (L) 22.0 - 29.0 mmol/L    Calcium 9.1 8.6 - 10.5 mg/dL    Total Protein 5.9 (L) 6.0 - 8.5 g/dL    Albumin 3.7 3.5 - 5.2 g/dL    ALT (SGPT) 13 1 - 33 U/L    AST (SGOT) 24 1 - 32 U/L    Alkaline Phosphatase 59 39 - 117 U/L    Total Bilirubin 0.3 0.0 - 1.2 mg/dL    Globulin 2.2 gm/dL    A/G Ratio 1.7 g/dL    BUN/Creatinine Ratio 22.0 7.0 - 25.0    Anion Gap 13.0 5.0 - 15.0 mmol/L    eGFR 33.8 (L) >60.0 mL/min/1.73   Uric Acid    Collection Time: 10/05/23  1:46 AM    Specimen: Blood   Result Value Ref Range    Uric Acid 13.7 (H) 2.4 - 5.7 mg/dL   Lactate Dehydrogenase    Collection Time: 10/05/23  1:46 AM    Specimen: Blood   Result Value Ref Range     (H) 135 - 214 U/L   Phosphorus    Collection Time: 10/05/23  1:46 AM    Specimen: Blood   Result Value Ref Range    Phosphorus 4.4 2.5 - 4.5 mg/dL   Magnesium    Collection Time: 10/05/23  1:46 AM    Specimen: Blood   Result Value Ref Range    Magnesium 2.2 1.6 - 2.4 mg/dL   CBC Auto Differential    Collection Time: 10/05/23  1:46 AM    Specimen: Blood   Result Value Ref Range    WBC 90.50 (C) 3.40 - 10.80 10*3/mm3    RBC 2.22 (L) 3.77 - 5.28 10*6/mm3    Hemoglobin 7.3 (L) 12.0 - 15.9 g/dL    Hematocrit 24.4 (L) 34.0 - 46.6 %    .8 (H) 79.0 - 97.0 fL    MCH 33.1 (H) 26.6 - 33.0 pg    MCHC 30.1 (L) 31.5 - 35.7 g/dL     RDW 22.5 (H) 12.3 - 15.4 %    RDW-SD 87.1 (H) 37.0 - 54.0 fl    MPV 10.0 6.0 - 12.0 fL    Platelets 65 (L) 140 - 450 10*3/mm3   Scan Slide    Collection Time: 10/05/23  1:46 AM    Specimen: Blood   Result Value Ref Range    Scan Slide     Manual Differential    Collection Time: 10/05/23  1:46 AM    Specimen: Blood   Result Value Ref Range    Neutrophil % 11.0 (L) 42.7 - 76.0 %    Lymphocyte % 12.0 (L) 19.6 - 45.3 %    Monocyte % 16.0 (H) 5.0 - 12.0 %    Bands %  6.0 (H) 0.0 - 5.0 %    Metamyelocyte % 1.0 (H) 0.0 - 0.0 %    Myelocyte % 2.0 (H) 0.0 - 0.0 %    Promyelocyte % 2.0 (H) 0.0 - 0.0 %    Blasts % 50.0 (H) 0.0 - 0.0 %    Neutrophils Absolute 15.39 (H) 1.70 - 7.00 10*3/mm3    Lymphocytes Absolute 10.86 (H) 0.70 - 3.10 10*3/mm3    Monocytes Absolute 14.48 (H) 0.10 - 0.90 10*3/mm3    Anisocytosis Slight/1+ None Seen    Dacrocytes Slight/1+ None Seen    Poikilocytes Slight/1+ None Seen    WBC Morphology Normal Normal    Platelet Estimate Decreased Normal   ECG 12 Lead Rhythm Change    Collection Time: 10/05/23  2:32 AM   Result Value Ref Range    QT Interval 409 ms    QTC Interval 423 ms          Imaging:  XR Chest 1 View    Result Date: 10/3/2023  Impression: No active disease Electronically Signed: Francis Shah MD  10/3/2023 5:42 AM EDT  Workstation ID: [id number]    US Renal Bilateral    Result Date: 10/4/2023  Impression: 1. Increased echogenicity in both kidneys suggesting medical renal disease. 2. No evidence of obstructive uropathy. 3. 1.9 cm left renal cyst. Electronically Signed: Deepak Pineda MD  10/4/2023 11:31 AM EDT  Workstation ID: [id number]     Assessment/Plan:     Dyspnea    Shortness of breath    Coronary artery disease involving native coronary artery of native heart with unstable angina pectoris    Essential hypertension    Dyslipidemia    Thrombocytopenia    Dizziness    History of MI (myocardial infarction)    Leukocytosis    Symptomatic anemia       Acute kidney injury likely secondary to  prerenal component  Check arrhythmias  Hypertension  Coronary artery disease  Leukocytosis  Possible GI bleed  Left renal cyst     Recommendations:  Creatinine is improving   Noted renal ultrasound  Gentle hydration  Control heart rate  Correct electrolyte  Follow repeat labs

## 2023-10-05 NOTE — THERAPY TREATMENT NOTE
"Subjective: Pt agreeable to therapeutic plan of care.    Objective:     Bed mobility -  Mod I   Transfers -  CGA from commode and bed with use of RW  Ambulation -  65' with RW with CGA    Therapeutic Exercise -  Completed 10 reps each of BLE strengthening exercises in sitting     Vitals: WNL    Pain: 0 VAS   Location: n/a  Intervention for pain: N/A    Education: Provided education on the importance of mobility in the acute care setting, Verbal/Tactile Cues, Transfer Training, Gait Training, and Energy conservation strategies    Assessment: Tricia Marroquin presents with functional mobility impairments which indicate the need for skilled intervention. Tolerating session today without incident. Pt was on room air and telemetry this date.  Pt performed bed mobility with mod I, transfers with RW with CGA from commode and bed and ambulated 65' with RW with CGA.  Pt completed BLE strengthening exercises sitting EOB.  Will continue to follow and progress as tolerated.     Plan/Recommendations:   Low Intensity Therapy recommended post-acute care - This is recommended as therapy feels this patient would require 2-3 visits per week. OP or HH would be the best option depending on patient's home bound status. Consider, if the patient has other  \"skilled\" needs such as wounds, IV antibiotics, etc. Combined with \"low intensity\" could also equate to a SNF. If patient is medically complex, consider LTAC.. Pt requires no DME at discharge.     Pt desires Home with Home Health at discharge. Pt cooperative; agreeable to therapeutic recommendations and plan of care.         Basic Mobility 6-click:  Rollin = Total, A lot = 2, A little = 3; 4 = None  Supine>Sit:   1 = Total, A lot = 2, A little = 3; 4 = None   Sit>Stand with arms:  1 = Total, A lot = 2, A little = 3; 4 = None  Bed>Chair:   1 = Total, A lot = 2, A little = 3; 4 = None  Ambulate in room:  1 = Total, A lot = 2, A little = 3; 4 = None  3-5 Steps with railin " = Total, A lot = 2, A little = 3; 4 = None  Score: 19      Post-Tx Position: Supine with HOB Elevated and Call light and personal items within reach  PPE: gloves

## 2023-10-05 NOTE — DISCHARGE PLACEMENT REQUEST
"Tricia Marroquin (88 y.o. Female)       Date of Birth   1935    Social Security Number       Address   [address] [place] [facility]    Home Phone   [phone]    MRN   [id number]       [employer]ism   [facility]    Marital Status                               Admission Date   10/3/23    Admission Type   Emergency    Admitting Provider   Amy Villagomez MD    Attending Provider   Monet Bonner MD    Department, Room/Bed   [facility] 2D, 257/1       Discharge Date       Discharge Disposition       Discharge Destination                                 Attending Provider: Monet Bonner MD    Allergies: Aspirin, Codeine, Penicillins, Lisinopril    Isolation: None   Infection: None   Code Status: No CPR    Ht: 154.9 cm (61\")   Wt: 77.4 kg (170 lb 9.6 oz)    Admission Cmt: None   Principal Problem: Dyspnea [R06.00]                   Active Insurance as of 10/3/2023       Primary Coverage       Payor Plan Insurance Group Employer/Plan Group    ANTHEM MEDICARE REPLACEMENT ANTHEM MEDICARE ADVANTAGE [id number]       Payor Plan Address Payor Plan Phone Number Payor Plan Fax Number Effective Dates    [address] [phone]  1/1/2023 - None Entered    [facility] [address]         Subscriber Name Subscriber Birth Date Member ID       TRICIA MARROQUIN 1935 [id number]               Secondary Coverage       Payor Plan Insurance Group Employer/Plan Group    [place]        Payor Plan Address Payor Plan Phone Number Payor Plan Fax Number Effective Dates    [address]   4/12/2022 - None Entered    [place] [address]         Subscriber Name Subscriber Birth Date Member ID       TRICIA MARROQUIN 1935 [id number]                     Emergency Contacts        (Rel.) Home Phone Work Phone Mobile Phone    MEGHA MARROQUIN (Son) [phone] -- [phone]                "

## 2023-10-05 NOTE — PLAN OF CARE
Pt presents with functional mobility impairments which indicate the need for skilled intervention. Tolerating session today without incident. Pt was on room air and telemetry this date.  Pt performed bed mobility with mod I, transfers with RW with CGA from commode and bed and ambulated 65' with RW with CGA.  Pt completed BLE strengthening exercises sitting EOB.  Will continue to follow and progress as tolerated.

## 2023-10-05 NOTE — PLAN OF CARE
Goal Outcome Evaluation:  Plan of Care Reviewed With: patient        Progress: improving      Pt resting at this time. Cardizem drip discontinued as ordered, sinus rhythm as per EKG. Will continue to monitor.

## 2023-10-05 NOTE — PROGRESS NOTES
[facility]   Daily Progress Note    Patient Name: Tricia Marroquin  : 1935  MRN: [id number]  Primary Care Physician:  Danish Herbert MD  Date of admission: 10/3/2023  Date and Time of Service: 10/4/23    Brief clinical summary:  88-year-old female with history of HTN, HLD, PAD, CAD with stenting on Plavix and aspirin presented to the hospital with complaints of palpitations and SOB. Cardio saw pt, questions afib but thinks abnormal rhythm is more consistent with sinus tach with intermittent second degree block (Wenckebach). She was also noted to have a dark stool, however, has been taking oral iron supplementation over the last month. So GI is not planning any acute interventions. Hemonc consulted for abnormal cytopenias (leucocytosis with circulating blast, anemia and thrombocytopenia)     Subjective      Met this pleasant lady resting in her room. She is off Cardizem drip, and now maintaining rate control on Coreg.  Patient did not sleep well yesterday due to longstanding neuropathy.  Outpatient setting she did not tolerate gabapentin or Norco.  She has never tried Lyrica and open to trying it.    Objective      Vitals:   Temp:  [97.4 øF (36.3 øC)-98.2 øF (36.8 øC)] 98.2 øF (36.8 øC)  Heart Rate:  [] 71  Resp:  [14-25] 14  BP: ()/(31-91) 113/43    Physical Exam   General: normal interaction, not in distress, family at bed  Mental status: Alert and oriented x3  Head: Normocephalic and atraumatic  Eyes: EOMI, nonicteric  CVS: Not tachycardic, no gallops  Respiration: Nonlabored breathing, clear to auscultation  GI: Soft, nondistended, nontender, bowel sounds present  Extremity: No leg edema, no gross deformities  Neurology: Moves all extremities, no facial droop, normal speech  Psychiatry: Normal interaction, normal affect     Result Review    Result Review:  I have personally reviewed the results from the time of this admission to 10/5/2023 10:32 EDT and  agree with these findings:  [x]  Laboratory  []  Microbiology  []  Radiology  [x]  EKG/Telemetry   []  Cardiology/Vascular   []  Pathology  []  Old records  []  Other:    Assessment & Plan      allopurinol, 200 mg, Oral, Daily  atorvastatin, 20 mg, Oral, Daily  carvedilol, 12.5 mg, Oral, BID With Meals  cetirizine, 10 mg, Oral, Daily  ferrous sulfate, 324 mg, Oral, Daily With Breakfast  hydroxyurea, 1,000 mg, Oral, BID  pantoprazole, 40 mg, Oral, Daily  pregabalin, 75 mg, Oral, Daily  senna-docusate sodium, 2 tablet, Oral, BID  sodium chloride, 10 mL, Intravenous, Q12H               Active Hospital Problems:  Active Hospital Problems    Diagnosis     **Dyspnea     Dizziness     History of MI (myocardial infarction)     Leukocytosis     Symptomatic anemia     Dyslipidemia     Thrombocytopenia     Essential hypertension     Coronary artery disease involving native coronary artery of native heart with unstable angina pectoris     Shortness of breath      Plan:   #Supraventricular tachycardia, suspected A-fib with RVR versus sinus tach with PACs and Wenckebach  -Presently rate controlled off Cardizem drip  -Continue Coreg  -Cardiology following    #Leukocytosis with circulating blasts suspicious of myeloproliferative disease  #Anemia  #Thrombocytopenia  -Hematology consulted    #Acute kidney injury, prerenal due to dehydration  -Serum creatinine 1.98-->1.5 (previously <1 in 2020)  -Renal ultrasound showed medical renal disease, no obstructive uropathy  -On IV fluid  -Hold home Atacand and Aldactone  -Monitor renal function  -Nephrology following    #Left renal cyst, 1.9 cm, incidental finding on ultrasound    #CAD  -Continue atorvastatin, Coreg, Imdur  -Aspirin and Plavix on hold due to risk of bleeding in the context of thrombocytopenia    #Insomnia brought on by peripheral neuropathy  -We will start trial of Lyrica  -Change melatonin to scheduled nightly    DVT prophylaxis:  Mechanical DVT prophylaxis orders are  present.    CODE STATUS:    Medical Intervention Limits: NO intubation (DNI)  Level Of Support Discussed With: Patient  Code Status (Patient has no pulse and is not breathing): No CPR (Do Not Attempt to Resuscitate)  Medical Interventions (Patient has pulse or is breathing): Limited Support      Disposition:  I expect patient to be discharged 2-3 days      Electronically signed by Monet Bonner MD, 10/05/23, 10:32 EDT.  [facility]

## 2023-10-05 NOTE — PROGRESS NOTES
Cardiology Progress Note    Patient Identification:  Name: Tricia Marroquin  Age: 88 y.o.  Sex: female  :  1935  MRN: [id number]                 Follow Up / Chief Complaint: Arrhythmia  acute on chronic HFpEf   Chief Complaint   Patient presents with    Chest Pain       Interval History: Patient presented with palpations and irregular heart rate.  Patient is noted to have possible ectopic atrial arrhythmia.  she remains on Cardizem drip      NP NOTE: Patient seen and examined this morning and again with Dr. Glass.  Patient denies any chest pain or shortness of breath.  She only received one dose of IV lasix while in the ED with significant improvement.  She walked with PT today and did well.  EKG reviewed from this am shows normal sinus rhythm and she continues to be in normal sinus rhythm currently.     She is still undecided if she would want to pursue bone marrow biopsy.   Possible holter monitor on discharge.     Electronically signed by KAISER Simmons, 10/05/23, 1:38 PM EDT.    Cardiology attending addendum :    I have personally performed a face-to-face diagnostic evaluation, physical exam and reviewed data on this patient.  I have reviewed documentation done by me and nurse practitioner  and corrected as needed.  And agree with the different components of documentation.Greater than 50% of the time spent in the care of this patient was provided by attending consultant/me.           Subjective: Patient seen and examined.  Chart reviewed.  Labs reviewed..  Discussed with RN taking care of patient.        Objective: High-sensitivity troponin 18--17  proBNP 2452 hemoglobin A1c 6.4 BUN 37 creatinine 1.65 TSH 6.0 WBC 68 hemoglobin 7.8 platelets 58  10/3/2023: , BUN 38 creatinine 1.98 magnesium 2.8 uric acid 15 fibrinogen normal WBC 16.8 hemoglobin 7.3 platelets 62 urinalysis unremarkable  10/4/2023: , potassium 4.3 BUN 36 creatinine 1.67 uric acid 14.4 Free T4 normal WBCs 83 hemoglobin  7.5 platelets 65  10/5/2023: , BUN 33 creatinine 1.5, uric acid 13.7 WBC 90.5 hgb 7.3 plts 65         History of Present Illness:      Ms. Tricia Marroquin has PMH of     CAD, stent to LCx 5/12/2016, NSTEMI, cath 9/23/2022 VALENTINO to in-stent RCA stenosis  Questionable paroxysmal A. fib, no documentation found  Dyspnea due to Brilinta  Hypertension  Dyslipidemia  PAD, aortobifem 10/5/2007, right common femoral arthrectomy with patch angioplasty 12/2008  Thrombocytopenia  JELENA, back surgery, aorta surgery  Former smoker quit in 1999  Allergy to penicillin  Allergy to aspirin but tolerates baby aspirin  Family history of leukemia in aunt      Presented to the ED on 10/3/2023 with shortness of breath and irregular heart beat.  Patient reports palpitations and irregular heart beat woke her up out of sleep.  She says her heart rate was 114 and pounding.   She denies any chest pain however reports worsening dyspnea on exertion and orthopnea over the last few weeks.  No loss of appetite or lower extremity edema.  Patient also reports one month of dark tarry stools but was also started on iron supplement by PCP.      Patient was recently sent from [facility] 9/22/2022 with questionable diagnosis of A. fib.  Review of records from [facility] revealed patient was in sinus arrhythmia and PACs but no inocencio A. fib.  Patient was having chest pain at that time therefore was admitted to the hospital        Previous labs:  Labs from [facility] 9/16/2022 reveal troponin mildly elevated hemoglobin 10.6 platelet count 71.  proBNP normal at 65.  Glucose elevated at 105.  Patient work-up 4/12/2022 revealed normal troponin, normal proBNP at 138.  Normal CMP, PT PTT.  CBC with low white count at 2.8, hemoglobin at 11.4, platelet count of 64  Chest x-ray 4/12/2022 reveals no acute cardiopulmonary abnormality.  EKG reviewed by me from 4/12/2022 reveals sinus rhythm with sinus bradycardia at the rate of 57 bpm  Labs from 9/15/2022 reveal CBC with a  hemoglobin of 10.6, normal CMP and BNP at 65.  Labs from 9/24/2022 revealed A1c of 6.  Lipid profile with cholesterol 106, triglycerides 74, HDL 48, LDL 43     Previous cardiac work-up:  Cath 9/23/2023 PCI to distal RCA in-stent stenosis  Lexiscan Cardiolite 4/13/2022 normal perfusion EF of 78%  Echo 5/9/2022 EF 60 to 65% severely enlarged left atrium by volume.     Data:  Labs 10/3/2023: HS troponin 18-->17.  proBNP 2457.  CMP with a creatinine of 1.65, EGFR 30.  A1c of 6.4, TSH elevated at 6.01.  White count 68,000, hemoglobin 7.8, , platelet count 58 K  Chest x-ray 10/3/2023 no acute cardiopulmonary abnormality.  EKG done 10/3/2023 reviewed/interpreted by me reveals ectopic atrial tachycardia at the rate of 113 bpm        Assessment:  :        Palpitations  Sinus tachycardia with Wenckebach second-degree AV block  Acute HFpEF due to diastolic dysfunction from anemia and tachycardia.  Marked leukocytosis with left shift and numerous circulating blast cells  Microcytic anemia  Thrombocytopenia  CAD, MI, PCI  PAD history of surgery  Hypertension  Dyslipidemia  Former smoker  Thrombocytopenia  Easy bruising  Diabetes with hemoglobin A1c of 6.4  Prediabetes, labs from 9/24/2022 reveal A1c of 6        Recommendations / Plan:         Telemetry is revealing sinus rhythm.  Patient's white count is elevated and has blast cells.  Appreciated the hematology oncology note.  Patient is refusing work-up including bone marrow biopsy at the current time.  Discussed at length with patient and family members.  Patient is considering.    Will continue aspirin atorvastatin, clopidogrel, carvedilol, isosorbide, Atacand hydrochlorothiazide, Aldactone, furosemide as tolerated          Copied text in this portion of the note has been reviewed and is accurate as of 10/5/2023    Past Medical History:  Past Medical History:   Diagnosis Date    Hypertension     Seasonal allergies      Past Surgical History:  Past Surgical History:  "  Procedure Laterality Date    AORTA SURGERY      BACK SURGERY      CARDIAC CATHETERIZATION N/A 2022    Procedure: Left Heart Cath;  Surgeon: Rodriguez Glass MD;  Location: [facility] CATH INVASIVE LOCATION;  Service: Cardiovascular;  Laterality: N/A;    CORONARY ANGIOPLASTY WITH STENT PLACEMENT  2022        Social History:   Social History     Tobacco Use    Smoking status: Former     Packs/day: 2.00     Years: 40.00     Pack years: 80.00     Types: Cigarettes     Start date:      Quit date:      Years since quittin.7    Smokeless tobacco: Never   Substance Use Topics    Alcohol use: Never      Family History:  Family History   Problem Relation Age of Onset    Heart disease Mother     Heart disease Father     Heart attack Father     Lung cancer Brother         associated to intense cigarette smoking          Allergies:  Allergies   Allergen Reactions    Aspirin Other (See Comments)     Pt reports \"makes my heart go crazy\"    Codeine GI Intolerance    Penicillins Hives    Lisinopril Cough     Scheduled Meds:  allopurinol, 200 mg, Daily  atorvastatin, 20 mg, Daily  carvedilol, 12.5 mg, BID With Meals  cetirizine, 10 mg, Daily  ferrous sulfate, 324 mg, Daily With Breakfast  hydroxyurea, 1,000 mg, BID  melatonin, 5 mg, Nightly  pantoprazole, 40 mg, Daily  pregabalin, 75 mg, Daily  senna-docusate sodium, 2 tablet, BID  sodium chloride, 10 mL, Q12H          Review of Systems:   ROS  Review of Systems   Constitution: Negative for chills and fever.   Cardiovascular: Negative for chest pain and palpitations.   Respiratory: Negative for cough and hemoptysis.    Gastrointestinal: Negative for nausea.        Constitutional:  Temp:  [97.6 øF (36.4 øC)-98.4 øF (36.9 øC)] 98.1 øF (36.7 øC)  Heart Rate:  [] 97  Resp:  [14-21] 20  BP: ()/(31-91) 152/60    Physical Exam   /60 (BP Location: Left arm, Patient Position: Lying)   Pulse 97   Temp 98.1 øF (36.7 øC) (Oral)   Resp 20   Ht " "154.9 cm (61\")   Wt 77.4 kg (170 lb 9.6 oz)   SpO2 94%   BMI 32.23 kg/mý   General:  Appears in no acute distress  Eyes: Sclera is anicteric,  conjunctiva is clear   HEENT:  No JVD. Thyroid not visibly enlarged. No mucosal pallor or cyanosis  Respiratory: Respirations regular and unlabored at rest.  Bilaterally good breath sounds, with good air entry in all fields. No crackles, rubs or wheezes auscultated  Cardiovascular: S1,S2 regular rate and rhythm. No murmur, rub or gallop auscultated.  . No pretibial pitting edema  Gastrointestinal: Abdomen nondistended, soft  Musculoskeletal:  No abnormal movements  Extremities: No digital clubbing or cyanosis  Skin: Color pink. Skin warm and dry to touch. No rashes  No xanthoma  Neuro: Alert and awake, no lateralizing deficits appreciated    INTAKE AND OUTPUT:    Intake/Output Summary (Last 24 hours) at 10/5/2023 1408  Last data filed at 10/5/2023 1500  Gross per 24 hour   Intake 510 ml   Output --   Net 510 ml       Cardiographics  Telemetry: sinus rhythm     ECG:   ECG 12 Lead Rhythm Change   Preliminary Result   HEART RATE= 64  bpm   RR Interval= 936  ms   MA Interval= 197  ms   P Horizontal Axis= 51  deg   P Front Axis= 51  deg   QRSD Interval= 100  ms   QT Interval= 409  ms   QTcB= 423  ms   QRS Axis= 59  deg   T Wave Axis= 53  deg   - NORMAL ECG -   Sinus rhythm   Electronically Signed By:    Date and Time of Study: 2023-10-05 02:32:44      ECG 12 Lead Rhythm Change   Preliminary Result   HEART RATE= 83  bpm   RR Interval= 721  ms   MA Interval= 265  ms   P Horizontal Axis= 195  deg   P Front Axis= -58  deg   QRSD Interval= 86  ms   QT Interval= 362  ms   QTcB= 426  ms   QRS Axis= 73  deg   T Wave Axis= 40  deg   - ABNORMAL ECG -   Sinus or ectopic atrial rhythm   Supraventricular bigeminy   Prolonged MA interval   Low voltage, precordial leads   Anteroseptal infarct, old   Electronically Signed By:    Date and Time of Study: 2023-10-03 13:50:19      ECG 12 Lead " Chest Pain   Final Result   HEART RATE= 112  bpm   RR Interval= 552  ms   MT Interval= 266  ms   P Horizontal Axis= 207  deg   P Front Axis= 187  deg   QRSD Interval= 85  ms   QT Interval= 315  ms   QTcB= 424  ms   QRS Axis= 74  deg   T Wave Axis= 6  deg   - ABNORMAL ECG -   Sinus or ectopic atrial tachycardia   Atrial premature complexes   Prolonged MT interval   When compared with ECG of 24-Sep-2022 9:22:12,   Significant change in rhythm: previously sinus   New conduction abnormality   Electronically Signed By: Constantin Griffin) 04-Oct-2023 07:47:11   Date and Time of Study: 2023-10-03 04:56:05      SCANNED - TELEMETRY     Final Result      SCANNED - TELEMETRY     Final Result      SCANNED - TELEMETRY     Final Result      SCANNED - TELEMETRY     Final Result      SCANNED - TELEMETRY     Final Result      SCANNED - TELEMETRY     Final Result      SCANNED - TELEMETRY     Final Result      SCANNED - TELEMETRY     Final Result      SCANNED - TELEMETRY     Final Result      SCANNED - TELEMETRY     Final Result      SCANNED - TELEMETRY     Final Result        I have personally reviewed EKG    Echocardiogram: Results for orders placed during the hospital encounter of 10/03/23    Adult Transthoracic Echo Complete w/ Color, Spectral and Contrast if necessary per protocol    Interpretation Summary    Estimated right ventricular systolic pressure from tricuspid regurgitation is normal (<35 mmHg).    Conclusion      Normal LV size and contractility EF of 60 to 65%  Mild right ventricular enlargement seen.  Normal atrial size  Pulmonic valve is not well visualized.  Aortic valve is mildly calcified and sclerosed.  Dopplers did not reveal any significant abnormality.  Mitral valve, tricuspid valve appears structurally normal, mild tricuspid and mitral regurgitation seen.  Calculated RV systolic pressure of 25 mmHg.  No pericardial effusion seen.  Proximal aorta appears normal in size.      Lab Review   I have reviewed the  "labs  Results from last 7 days   Lab Units 10/03/23  0724 10/03/23  0524   HSTROP T ng/L 17* 18*     Results from last 7 days   Lab Units 10/05/23  0146   MAGNESIUM mg/dL 2.2     Results from last 7 days   Lab Units 10/05/23  0146   SODIUM mmol/L 138   POTASSIUM mmol/L 4.0   BUN mg/dL 33*   CREATININE mg/dL 1.50*   CALCIUM mg/dL 9.1         Results from last 7 days   Lab Units 10/05/23  0146 10/04/23  0814 10/03/23  2041   WBC 10*3/mm3 90.50* 83.00* 69.80*   HEMOGLOBIN g/dL 7.3* 7.5* 7.3*   HEMATOCRIT % 24.4* 24.3* 23.0*   PLATELETS 10*3/mm3 65* 65* 62*     Results from last 7 days   Lab Units 10/03/23  0524   INR  1.01   APTT seconds 26.7*       RADIOLOGY:  Imaging Results (Last 24 Hours)       ** No results found for the last 24 hours. **                  )10/5/2023  Rodriguez Glass MD      [facility] Dragon/Transcription:   \"Dictated utilizing Dragon dictation\".   "

## 2023-10-05 NOTE — PLAN OF CARE
Goal Outcome Evaluation:  Plan of Care Reviewed With: patient, son        Progress: no change  Outcome Evaluation: cont. to be followed by oncology. Still undecided on bone marrow biopsy . Is amb. to bathroom with RWX. Started on lyrica for c/o leg cramps. Remains risk for falls & skin injury

## 2023-10-06 ENCOUNTER — APPOINTMENT (OUTPATIENT)
Dept: INTERVENTIONAL RADIOLOGY/VASCULAR | Facility: HOSPITAL | Age: 88
DRG: 291 | End: 2023-10-06
Payer: MEDICARE

## 2023-10-06 DIAGNOSIS — D72.829 LEUKOCYTOSIS, UNSPECIFIED TYPE: Primary | ICD-10-CM

## 2023-10-06 LAB
ALBUMIN SERPL-MCNC: 3.5 G/DL (ref 3.5–5.2)
ALBUMIN/GLOB SERPL: 1.5 G/DL
ALP SERPL-CCNC: 59 U/L (ref 39–117)
ALT SERPL W P-5'-P-CCNC: 13 U/L (ref 1–33)
ANION GAP SERPL CALCULATED.3IONS-SCNC: 11 MMOL/L (ref 5–15)
ANISOCYTOSIS BLD QL: ABNORMAL
AST SERPL-CCNC: 25 U/L (ref 1–32)
BILIRUB SERPL-MCNC: 0.2 MG/DL (ref 0–1.2)
BLASTS NFR BLD MANUAL: 49 % (ref 0–0)
BUN SERPL-MCNC: 30 MG/DL (ref 8–23)
BUN/CREAT SERPL: 17.8 (ref 7–25)
CALCIUM SPEC-SCNC: 8.7 MG/DL (ref 8.6–10.5)
CHLORIDE SERPL-SCNC: 104 MMOL/L (ref 98–107)
CO2 SERPL-SCNC: 23 MMOL/L (ref 22–29)
CREAT SERPL-MCNC: 1.69 MG/DL (ref 0.57–1)
DACRYOCYTES BLD QL SMEAR: ABNORMAL
DEPRECATED RDW RBC AUTO: 80.1 FL (ref 37–54)
EGFRCR SERPLBLD CKD-EPI 2021: 29.3 ML/MIN/1.73
ERYTHROCYTE [DISTWIDTH] IN BLOOD BY AUTOMATED COUNT: 21.5 % (ref 12.3–15.4)
GEN 5 2HR TROPONIN T REFLEX: 18 NG/L
GLOBULIN UR ELPH-MCNC: 2.3 GM/DL
GLUCOSE SERPL-MCNC: 97 MG/DL (ref 65–99)
HCT VFR BLD AUTO: 22.1 % (ref 34–46.6)
HGB BLD-MCNC: 7 G/DL (ref 12–15.9)
LARGE PLATELETS: ABNORMAL
LDH SERPL-CCNC: 928 U/L (ref 135–214)
LYMPHOCYTES # BLD MANUAL: 3.8 10*3/MM3 (ref 0.7–3.1)
LYMPHOCYTES NFR BLD MANUAL: 6 % (ref 5–12)
MACROCYTES BLD QL SMEAR: ABNORMAL
MAGNESIUM SERPL-MCNC: 2.1 MG/DL (ref 1.6–2.4)
MCH RBC QN AUTO: 33.6 PG (ref 26.6–33)
MCHC RBC AUTO-ENTMCNC: 31.4 G/DL (ref 31.5–35.7)
MCV RBC AUTO: 107.1 FL (ref 79–97)
METAMYELOCYTES NFR BLD MANUAL: 4 % (ref 0–0)
MONOCYTES # BLD: 4.56 10*3/MM3 (ref 0.1–0.9)
MYELOCYTES NFR BLD MANUAL: 2 % (ref 0–0)
NEUTROPHILS # BLD AUTO: 6.84 10*3/MM3 (ref 1.7–7)
NEUTROPHILS NFR BLD MANUAL: 8 % (ref 42.7–76)
NEUTS BAND NFR BLD MANUAL: 1 % (ref 0–5)
PHOSPHATE SERPL-MCNC: 4.4 MG/DL (ref 2.5–4.5)
PLATELET # BLD AUTO: 64 10*3/MM3 (ref 140–450)
PMV BLD AUTO: 10.1 FL (ref 6–12)
POIKILOCYTOSIS BLD QL SMEAR: ABNORMAL
POTASSIUM SERPL-SCNC: 4.1 MMOL/L (ref 3.5–5.2)
PROMYELOCYTES NFR BLD MANUAL: 25 % (ref 0–0)
PROT SERPL-MCNC: 5.8 G/DL (ref 6–8.5)
QT INTERVAL: 389 MS
QTC INTERVAL: 430 MS
RBC # BLD AUTO: 2.07 10*6/MM3 (ref 3.77–5.28)
SCAN SLIDE: NORMAL
SMALL PLATELETS BLD QL SMEAR: ABNORMAL
SODIUM SERPL-SCNC: 138 MMOL/L (ref 136–145)
TROPONIN T DELTA: -1 NG/L
TROPONIN T SERPL HS-MCNC: 19 NG/L
URATE SERPL-MCNC: 11.3 MG/DL (ref 2.4–5.7)
VARIANT LYMPHS NFR BLD MANUAL: 1 % (ref 0–5)
VARIANT LYMPHS NFR BLD MANUAL: 4 % (ref 19.6–45.3)
WBC MORPH BLD: NORMAL
WBC NRBC COR # BLD: 76 10*3/MM3 (ref 3.4–10.8)

## 2023-10-06 PROCEDURE — 85025 COMPLETE CBC W/AUTO DIFF WBC: CPT | Performed by: INTERNAL MEDICINE

## 2023-10-06 PROCEDURE — 88313 SPECIAL STAINS GROUP 2: CPT | Performed by: INTERNAL MEDICINE

## 2023-10-06 PROCEDURE — 25010000002 MIDAZOLAM PER 1 MG: Performed by: RADIOLOGY

## 2023-10-06 PROCEDURE — 93005 ELECTROCARDIOGRAM TRACING: CPT | Performed by: INTERNAL MEDICINE

## 2023-10-06 PROCEDURE — 84484 ASSAY OF TROPONIN QUANT: CPT | Performed by: INTERNAL MEDICINE

## 2023-10-06 PROCEDURE — 99232 SBSQ HOSP IP/OBS MODERATE 35: CPT | Performed by: INTERNAL MEDICINE

## 2023-10-06 PROCEDURE — 88184 FLOWCYTOMETRY/ TC 1 MARKER: CPT

## 2023-10-06 PROCEDURE — 88312 SPECIAL STAINS GROUP 1: CPT | Performed by: INTERNAL MEDICINE

## 2023-10-06 PROCEDURE — 93010 ELECTROCARDIOGRAM REPORT: CPT | Performed by: INTERNAL MEDICINE

## 2023-10-06 PROCEDURE — 079T3ZX DRAINAGE OF BONE MARROW, PERCUTANEOUS APPROACH, DIAGNOSTIC: ICD-10-PCS | Performed by: RADIOLOGY

## 2023-10-06 PROCEDURE — 25010000002 FENTANYL CITRATE (PF) 50 MCG/ML SOLUTION: Performed by: RADIOLOGY

## 2023-10-06 PROCEDURE — 84550 ASSAY OF BLOOD/URIC ACID: CPT | Performed by: INTERNAL MEDICINE

## 2023-10-06 PROCEDURE — 25010000002 ONDANSETRON PER 1 MG: Performed by: RADIOLOGY

## 2023-10-06 PROCEDURE — 97112 NEUROMUSCULAR REEDUCATION: CPT

## 2023-10-06 PROCEDURE — 85007 BL SMEAR W/DIFF WBC COUNT: CPT | Performed by: INTERNAL MEDICINE

## 2023-10-06 PROCEDURE — 97530 THERAPEUTIC ACTIVITIES: CPT

## 2023-10-06 PROCEDURE — 88185 FLOWCYTOMETRY/TC ADD-ON: CPT | Performed by: INTERNAL MEDICINE

## 2023-10-06 PROCEDURE — 97116 GAIT TRAINING THERAPY: CPT

## 2023-10-06 PROCEDURE — 88311 DECALCIFY TISSUE: CPT | Performed by: INTERNAL MEDICINE

## 2023-10-06 PROCEDURE — 83615 LACTATE (LD) (LDH) ENZYME: CPT | Performed by: INTERNAL MEDICINE

## 2023-10-06 PROCEDURE — 84100 ASSAY OF PHOSPHORUS: CPT | Performed by: INTERNAL MEDICINE

## 2023-10-06 PROCEDURE — 0 LIDOCAINE 1 % SOLUTION: Performed by: RADIOLOGY

## 2023-10-06 PROCEDURE — 99152 MOD SED SAME PHYS/QHP 5/>YRS: CPT

## 2023-10-06 PROCEDURE — 88305 TISSUE EXAM BY PATHOLOGIST: CPT | Performed by: INTERNAL MEDICINE

## 2023-10-06 PROCEDURE — 07DR3ZX EXTRACTION OF ILIAC BONE MARROW, PERCUTANEOUS APPROACH, DIAGNOSTIC: ICD-10-PCS | Performed by: RADIOLOGY

## 2023-10-06 PROCEDURE — 83735 ASSAY OF MAGNESIUM: CPT | Performed by: INTERNAL MEDICINE

## 2023-10-06 PROCEDURE — 77002 NEEDLE LOCALIZATION BY XRAY: CPT

## 2023-10-06 PROCEDURE — 80053 COMPREHEN METABOLIC PANEL: CPT | Performed by: INTERNAL MEDICINE

## 2023-10-06 RX ORDER — ONDANSETRON 2 MG/ML
INJECTION INTRAMUSCULAR; INTRAVENOUS AS NEEDED
Status: COMPLETED | OUTPATIENT
Start: 2023-10-06 | End: 2023-10-06

## 2023-10-06 RX ORDER — FENTANYL CITRATE 50 UG/ML
INJECTION, SOLUTION INTRAMUSCULAR; INTRAVENOUS AS NEEDED
Status: COMPLETED | OUTPATIENT
Start: 2023-10-06 | End: 2023-10-06

## 2023-10-06 RX ORDER — MIDAZOLAM HYDROCHLORIDE 1 MG/ML
INJECTION INTRAMUSCULAR; INTRAVENOUS AS NEEDED
Status: COMPLETED | OUTPATIENT
Start: 2023-10-06 | End: 2023-10-06

## 2023-10-06 RX ORDER — LIDOCAINE HYDROCHLORIDE 10 MG/ML
INJECTION, SOLUTION INFILTRATION; PERINEURAL AS NEEDED
Status: COMPLETED | OUTPATIENT
Start: 2023-10-06 | End: 2023-10-06

## 2023-10-06 RX ADMIN — Medication 5 MG: at 21:26

## 2023-10-06 RX ADMIN — FENTANYL CITRATE 100 MCG: 50 INJECTION, SOLUTION INTRAMUSCULAR; INTRAVENOUS at 12:52

## 2023-10-06 RX ADMIN — HYDROXYUREA 1000 MG: 500 CAPSULE ORAL at 09:16

## 2023-10-06 RX ADMIN — LIDOCAINE HYDROCHLORIDE 10 ML: 10 INJECTION, SOLUTION INFILTRATION; PERINEURAL at 12:53

## 2023-10-06 RX ADMIN — CARVEDILOL 12.5 MG: 6.25 TABLET, FILM COATED ORAL at 08:00

## 2023-10-06 RX ADMIN — DOCUSATE SODIUM 50 MG AND SENNOSIDES 8.6 MG 2 TABLET: 8.6; 5 TABLET, FILM COATED ORAL at 08:00

## 2023-10-06 RX ADMIN — MIDAZOLAM 1 MG: 1 INJECTION INTRAMUSCULAR; INTRAVENOUS at 12:52

## 2023-10-06 RX ADMIN — Medication 10 ML: at 08:00

## 2023-10-06 RX ADMIN — CETIRIZINE HYDROCHLORIDE 10 MG: 10 TABLET, FILM COATED ORAL at 08:00

## 2023-10-06 RX ADMIN — ONDANSETRON 4 MG: 2 INJECTION INTRAMUSCULAR; INTRAVENOUS at 12:38

## 2023-10-06 RX ADMIN — CARVEDILOL 12.5 MG: 6.25 TABLET, FILM COATED ORAL at 17:25

## 2023-10-06 RX ADMIN — HYDROXYUREA 1000 MG: 500 CAPSULE ORAL at 21:28

## 2023-10-06 RX ADMIN — ALLOPURINOL 200 MG: 100 TABLET ORAL at 08:00

## 2023-10-06 RX ADMIN — FERROUS SULFATE TAB EC 324 MG (65 MG FE EQUIVALENT) 324 MG: 324 (65 FE) TABLET DELAYED RESPONSE at 08:00

## 2023-10-06 RX ADMIN — PREGABALIN 75 MG: 75 CAPSULE ORAL at 08:00

## 2023-10-06 RX ADMIN — ATORVASTATIN CALCIUM 20 MG: 20 TABLET, FILM COATED ORAL at 08:00

## 2023-10-06 RX ADMIN — PANTOPRAZOLE SODIUM 40 MG: 40 TABLET, DELAYED RELEASE ORAL at 08:00

## 2023-10-06 RX ADMIN — Medication 10 ML: at 21:26

## 2023-10-06 NOTE — PROGRESS NOTES
[facility] Medicine Services   Daily Progress Note    Patient Name: Tricia Marroquin  : 1935  MRN: [id number]  Primary Care Physician:  Danish Herbert MD  Date of admission: 10/3/2023  Date and Time of Service: 10/6/23    Brief clinical summary:  88-year-old female with history of HTN, HLD, PAD, CAD with stenting on Plavix and aspirin presented to the hospital with complaints of palpitations and SOB. Cardio saw pt, questions afib but thinks abnormal rhythm is more consistent with sinus tach with intermittent second degree block (Wenckebach). She was also noted to have a dark stool, however, has been taking oral iron supplementation over the last month. So GI is not planning any acute interventions. Hemonc consulted for abnormal cytopenias (leucocytosis with circulating blast, anemia and thrombocytopenia)     Subjective      Met this pleasant lady resting in her room.  Son at her bedside.  She is pleasant and cheerful.  States that she slept well last night after we introduce melatonin and Lyrica.  States that she is agreeable to a bone marrow biopsy    Objective      Vitals:   Temp:  [97.8 øF (36.6 øC)-98.7 øF (37.1 øC)] 97.8 øF (36.6 øC)  Heart Rate:  [70-97] 84  Resp:  [19-24] 20  BP: (103-152)/(43-60) 144/49    Physical Exam   General: normal interaction, not in distress, son at bed  Mental status: Alert and oriented x3  Head: Normocephalic and atraumatic  Eyes: EOMI, nonicteric  CVS: Not tachycardic, no gallops  Respiration: Nonlabored breathing, clear to auscultation  GI: Soft, nondistended, nontender, bowel sounds present  Extremity: No leg edema, no gross deformities  Neurology: Moves all extremities, no facial droop, normal speech  Psychiatry: Normal interaction, normal affect     Result Review    Result Review:  I have personally reviewed the results from the time of this admission to 10/6/2023 10:55 EDT and agree with these findings:  [x]  Laboratory  []  Microbiology  []   Radiology  [x]  EKG/Telemetry   []  Cardiology/Vascular   []  Pathology  []  Old records  []  Other:    Assessment & Plan      allopurinol, 200 mg, Oral, Daily  atorvastatin, 20 mg, Oral, Daily  carvedilol, 12.5 mg, Oral, BID With Meals  cetirizine, 10 mg, Oral, Daily  ferrous sulfate, 324 mg, Oral, Daily With Breakfast  hydroxyurea, 1,000 mg, Oral, BID  melatonin, 5 mg, Oral, Nightly  pantoprazole, 40 mg, Oral, Daily  pregabalin, 75 mg, Oral, Daily  senna-docusate sodium, 2 tablet, Oral, BID  sodium chloride, 10 mL, Intravenous, Q12H               Active Hospital Problems:  Active Hospital Problems    Diagnosis     **Dyspnea     Dizziness     History of MI (myocardial infarction)     Leukocytosis     Symptomatic anemia     Dyslipidemia     Thrombocytopenia     Essential hypertension     Coronary artery disease involving native coronary artery of native heart with unstable angina pectoris     Shortness of breath      Plan:   #Supraventricular tachycardia, suspected A-fib with RVR versus sinus tach with PACs and Wenckebach  -Presently rate controlled off Cardizem drip  -Continue Coreg  -Cardiology following    #Leukocytosis with circulating blasts suspicious of myeloproliferative disease  #Anemia  #Thrombocytopenia  -Hematology consulted    #Acute kidney injury, prerenal due to dehydration  -Serum creatinine 1.98-->1.5-->1.69 (previously <1 in 2020)  -Renal ultrasound showed medical renal disease, no obstructive uropathy  -Hold home Atacand and Aldactone  -Monitor renal function  -Nephrology following    #Left renal cyst, 1.9 cm, incidental finding on ultrasound    #CAD  -Continue atorvastatin, Coreg, Imdur  -Aspirin and Plavix on hold due to risk of bleeding in the context of thrombocytopenia    #Insomnia brought on by peripheral neuropathy  -Reports relief from melatonin and Lyrica, continue both     DVT prophylaxis:  Mechanical DVT prophylaxis orders are present.    CODE STATUS:    Medical Intervention Limits: NO  intubation (DNI)  Level Of Support Discussed With: Patient  Code Status (Patient has no pulse and is not breathing): No CPR (Do Not Attempt to Resuscitate)  Medical Interventions (Patient has pulse or is breathing): Limited Support      Disposition:  I expect patient to be discharged 2-3 days      Electronically signed by Monet Bonner MD, 10/06/23, 10:55 EDT.  [facility] Hospitalist Team

## 2023-10-06 NOTE — PROGRESS NOTES
"                                                                                                                                      Nephrology  Progress Note                                        [facility]    Patient Identification    Name: Tricia Marroquin  Age: 88 y.o.  Sex: female  :  1935  MRN: [id number]      DATE OF SERVICE:  10/6/2023        Subective    Feeling better        Objective   Scheduled Meds:allopurinol, 200 mg, Oral, Daily  atorvastatin, 20 mg, Oral, Daily  carvedilol, 12.5 mg, Oral, BID With Meals  cetirizine, 10 mg, Oral, Daily  ferrous sulfate, 324 mg, Oral, Daily With Breakfast  hydroxyurea, 1,000 mg, Oral, BID  melatonin, 5 mg, Oral, Nightly  pantoprazole, 40 mg, Oral, Daily  pregabalin, 75 mg, Oral, Daily  senna-docusate sodium, 2 tablet, Oral, BID  sodium chloride, 10 mL, Intravenous, Q12H          Continuous Infusions:     PRN Meds:  acetaminophen    senna-docusate sodium **AND** polyethylene glycol **AND** bisacodyl **AND** bisacodyl    nitroglycerin    [COMPLETED] Insert Peripheral IV **AND** sodium chloride    sodium chloride    sodium chloride     Exam:  /52 (BP Location: Left arm, Patient Position: Lying)   Pulse 70   Temp 98.7 øF (37.1 øC) (Oral)   Resp 24   Ht 154.9 cm (61\")   Wt 82 kg (180 lb 12.4 oz)   SpO2 94%   BMI 34.16 kg/mý     Intake/Output last 3 shifts:  I/O last 3 completed shifts:  In: 510 [P.O.:510]  Out: -     Intake/Output this shift:  No intake/output data recorded.    Physical exam:  General Appearance:  Alert  Head:  Normocephalic, without obvious abnormality, atraumatic  Eyes:  PERRL, conjunctiva/corneas clear     Neck:  Supple,  no adenopathy;      Lungs:  Decreased BS occasion ronchi  Heart:  Regular rate and rhythm, S1 and S2 normal  Abdomen:  Soft, non-tender, bowel sounds active   Extremities: trace edema  Pulses: 2+ and symmetric all extremities  Skin:  No rashes or lesions       Data Review:  All labs (24hrs): "   Recent Results (from the past 24 hour(s))   Comprehensive Metabolic Panel    Collection Time: 10/06/23 12:27 AM    Specimen: Blood   Result Value Ref Range    Glucose 97 65 - 99 mg/dL    BUN 30 (H) 8 - 23 mg/dL    Creatinine 1.69 (H) 0.57 - 1.00 mg/dL    Sodium 138 136 - 145 mmol/L    Potassium 4.1 3.5 - 5.2 mmol/L    Chloride 104 98 - 107 mmol/L    CO2 23.0 22.0 - 29.0 mmol/L    Calcium 8.7 8.6 - 10.5 mg/dL    Total Protein 5.8 (L) 6.0 - 8.5 g/dL    Albumin 3.5 3.5 - 5.2 g/dL    ALT (SGPT) 13 1 - 33 U/L    AST (SGOT) 25 1 - 32 U/L    Alkaline Phosphatase 59 39 - 117 U/L    Total Bilirubin 0.2 0.0 - 1.2 mg/dL    Globulin 2.3 gm/dL    A/G Ratio 1.5 g/dL    BUN/Creatinine Ratio 17.8 7.0 - 25.0    Anion Gap 11.0 5.0 - 15.0 mmol/L    eGFR 29.3 (L) >60.0 mL/min/1.73   Uric Acid    Collection Time: 10/06/23 12:27 AM    Specimen: Blood   Result Value Ref Range    Uric Acid 11.3 (H) 2.4 - 5.7 mg/dL   Lactate Dehydrogenase    Collection Time: 10/06/23 12:27 AM    Specimen: Blood   Result Value Ref Range     (H) 135 - 214 U/L   Phosphorus    Collection Time: 10/06/23 12:27 AM    Specimen: Blood   Result Value Ref Range    Phosphorus 4.4 2.5 - 4.5 mg/dL   Magnesium    Collection Time: 10/06/23 12:27 AM    Specimen: Blood   Result Value Ref Range    Magnesium 2.1 1.6 - 2.4 mg/dL   CBC Auto Differential    Collection Time: 10/06/23 12:27 AM    Specimen: Blood   Result Value Ref Range    WBC 76.00 (C) 3.40 - 10.80 10*3/mm3    RBC 2.07 (L) 3.77 - 5.28 10*6/mm3    Hemoglobin 7.0 (L) 12.0 - 15.9 g/dL    Hematocrit 22.1 (L) 34.0 - 46.6 %    .1 (H) 79.0 - 97.0 fL    MCH 33.6 (H) 26.6 - 33.0 pg    MCHC 31.4 (L) 31.5 - 35.7 g/dL    RDW 21.5 (H) 12.3 - 15.4 %    RDW-SD 80.1 (H) 37.0 - 54.0 fl    MPV 10.1 6.0 - 12.0 fL    Platelets 64 (L) 140 - 450 10*3/mm3   Scan Slide    Collection Time: 10/06/23 12:27 AM    Specimen: Blood   Result Value Ref Range    Scan Slide     Manual Differential    Collection Time: 10/06/23 12:27  AM    Specimen: Blood   Result Value Ref Range    Neutrophil % 8.0 (L) 42.7 - 76.0 %    Lymphocyte % 4.0 (L) 19.6 - 45.3 %    Monocyte % 6.0 5.0 - 12.0 %    Bands %  1.0 0.0 - 5.0 %    Metamyelocyte % 4.0 (H) 0.0 - 0.0 %    Myelocyte % 2.0 (H) 0.0 - 0.0 %    Promyelocyte % 25.0 (H) 0.0 - 0.0 %    Atypical Lymphocyte % 1.0 0.0 - 5.0 %    Blasts % 49.0 (H) 0.0 - 0.0 %    Neutrophils Absolute 6.84 1.70 - 7.00 10*3/mm3    Lymphocytes Absolute 3.80 (H) 0.70 - 3.10 10*3/mm3    Monocytes Absolute 4.56 (H) 0.10 - 0.90 10*3/mm3    Anisocytosis Slight/1+ None Seen    Dacrocytes Slight/1+ None Seen    Macrocytes Slight/1+ None Seen    Poikilocytes Slight/1+ None Seen    WBC Morphology Normal Normal    Platelet Estimate Decreased Normal    Large Platelets Slight/1+ None Seen          Imaging:  XR Chest 1 View    Result Date: 10/3/2023  Impression: No active disease Electronically Signed: Francis Shah MD  10/3/2023 5:42 AM EDT  Workstation ID: [id number]    US Renal Bilateral    Result Date: 10/4/2023  Impression: 1. Increased echogenicity in both kidneys suggesting medical renal disease. 2. No evidence of obstructive uropathy. 3. 1.9 cm left renal cyst. Electronically Signed: Deepak Pineda MD  10/4/2023 11:31 AM EDT  Workstation ID: [id number]     Assessment/Plan:     Dyspnea    Shortness of breath    Coronary artery disease involving native coronary artery of native heart with unstable angina pectoris    Essential hypertension    Dyslipidemia    Thrombocytopenia    Dizziness    History of MI (myocardial infarction)    Leukocytosis    Symptomatic anemia       Acute kidney injury likely secondary to prerenal component  Check arrhythmias  Hypertension  Coronary artery disease  Leukocytosis  Possible GI bleed  Left renal cyst     Recommendations:  Creatinine is improving same as yesterday  Noted renal ultrasound  Gentle hydration  Control heart rate  Correct electrolyte

## 2023-10-06 NOTE — PROGRESS NOTES
Cardiology Progress Note    Patient Identification:  Name: Tricia Marroquin  Age: 88 y.o.  Sex: female  :  1935  MRN: [id number]                 Follow Up / Chief Complaint: Arrhythmia  acute on chronic HFpEf   Chief Complaint   Patient presents with    Chest Pain       Interval History: Patient presented with palpations and irregular heart rate.  Patient is noted to have possible ectopic atrial arrhythmia.  Was given rate control.  WBC came up with elevated white count with blast cells history of acute leukemia.  Hematology has seen patient.  Patient is undergoing bone marrow biopsy today.       Subjective: Patient seen and examined.  Chart reviewed.  Labs reviewed.  Discussed with RN taking care of patient...        Objective: High-sensitivity troponin 18--17  proBNP 2452 hemoglobin A1c 6.4 BUN 37 creatinine 1.65 TSH 6.0 WBC 68 hemoglobin 7.8 platelets 58  10/3/2023: , BUN 38 creatinine 1.98 magnesium 2.8 uric acid 15 fibrinogen normal WBC 16.8 hemoglobin 7.3 platelets 62 urinalysis unremarkable  10/4/2023: , potassium 4.3 BUN 36 creatinine 1.67 uric acid 14.4 Free T4 normal WBCs 83 hemoglobin 7.5 platelets 65  10/5/2023: , BUN 33 creatinine 1.5, uric acid 13.7 WBC 90.5 hgb 7.3 plts 65   10/6/2023: LDH is elevated at 928, BUN/creatinine are 30/1.69, uric acid elevated at 11.3, hemoglobin A1c of 6.4, WBC count of 76 K with platelet count of 64 and hemoglobin of 7        History of Present Illness:      Ms. Tricia Marroquin has PMH of     CAD, stent to LCx 2016, NSTEMI, cath 2022 VALENTINO to in-stent RCA stenosis  Questionable paroxysmal A. fib, no documentation found  Dyspnea due to Brilinta  Hypertension  Dyslipidemia  PAD, aortobifem 10/5/2007, right common femoral arthrectomy with patch angioplasty 2008  Thrombocytopenia  JELENA, back surgery, aorta surgery  Former smoker quit in   Allergy to penicillin  Allergy to aspirin but tolerates baby aspirin  Family history of  leukemia in aunt      Presented to the ED on 10/3/2023 with shortness of breath and irregular heart beat.  Patient reports palpitations and irregular heart beat woke her up out of sleep.  She says her heart rate was 114 and pounding.   She denies any chest pain however reports worsening dyspnea on exertion and orthopnea over the last few weeks.  No loss of appetite or lower extremity edema.  Patient also reports one month of dark tarry stools but was also started on iron supplement by PCP.      Patient was recently sent from [facility] 9/22/2022 with questionable diagnosis of A. fib.  Review of records from [facility] revealed patient was in sinus arrhythmia and PACs but no inocencio A. fib.  Patient was having chest pain at that time therefore was admitted to the hospital        Previous labs:  Labs from [facility] 9/16/2022 reveal troponin mildly elevated hemoglobin 10.6 platelet count 71.  proBNP normal at 65.  Glucose elevated at 105.  Patient work-up 4/12/2022 revealed normal troponin, normal proBNP at 138.  Normal CMP, PT PTT.  CBC with low white count at 2.8, hemoglobin at 11.4, platelet count of 64  Chest x-ray 4/12/2022 reveals no acute cardiopulmonary abnormality.  EKG reviewed by me from 4/12/2022 reveals sinus rhythm with sinus bradycardia at the rate of 57 bpm  Labs from 9/15/2022 reveal CBC with a hemoglobin of 10.6, normal CMP and BNP at 65.  Labs from 9/24/2022 revealed A1c of 6.  Lipid profile with cholesterol 106, triglycerides 74, HDL 48, LDL 43     Previous cardiac work-up:  Cath 9/23/2023 PCI to distal RCA in-stent stenosis  Lexiscan Cardiolite 4/13/2022 normal perfusion EF of 78%  Echo 5/9/2022 EF 60 to 65% severely enlarged left atrium by volume.     Data:  Labs 10/3/2023: HS troponin 18-->17.  proBNP 2457.  CMP with a creatinine of 1.65, EGFR 30.  A1c of 6.4, TSH elevated at 6.01.  White count 68,000, hemoglobin 7.8, , platelet count 58 K  Chest x-ray 10/3/2023 no acute cardiopulmonary  abnormality.  EKG done 10/3/2023 reviewed/interpreted by me reveals ectopic atrial tachycardia at the rate of 113 bpm        Assessment:  :        Palpitations  Sinus tachycardia with Wenckebach second-degree AV block  Acute HFpEF due to diastolic dysfunction from anemia and tachycardia.  Marked leukocytosis with left shift and numerous circulating blast cells  Microcytic anemia  Thrombocytopenia  CAD, MI, PCI  PAD history of surgery  Hypertension  Dyslipidemia  Former smoker  Thrombocytopenia  Easy bruising  Diabetes with hemoglobin A1c of 6.4  Prediabetes, labs from 2022 reveal A1c of 6        Recommendations / Plan:         Telemetry is revealing sinus rhythm  Patient's white count is elevated and has blast cells.  Appreciated the hematology oncology note.  Risk-benefit cellulitis of bone marrow biopsy discussed with patient and family.  Patient is willing to undergo biopsy today.    Will continue aspirin atorvastatin, clopidogrel, carvedilol, isosorbide, Atacand hydrochlorothiazide, Aldactone, furosemide as tolerated    We will follow-up as needed.      Copied text in this portion of the note has been reviewed and is accurate as of 10/6/2023    Past Medical History:  Past Medical History:   Diagnosis Date    Hypertension     Seasonal allergies      Past Surgical History:  Past Surgical History:   Procedure Laterality Date    AORTA SURGERY      BACK SURGERY      CARDIAC CATHETERIZATION N/A 2022    Procedure: Left Heart Cath;  Surgeon: Rodriguez Glass MD;  Location: [facility] CATH INVASIVE LOCATION;  Service: Cardiovascular;  Laterality: N/A;    CORONARY ANGIOPLASTY WITH STENT PLACEMENT  2022        Social History:   Social History     Tobacco Use    Smoking status: Former     Packs/day: 2.00     Years: 40.00     Pack years: 80.00     Types: Cigarettes     Start date:      Quit date:      Years since quittin.7    Smokeless tobacco: Never   Substance Use Topics    Alcohol use:  "Never      Family History:  Family History   Problem Relation Age of Onset    Heart disease Mother     Heart disease Father     Heart attack Father     Lung cancer Brother         associated to intense cigarette smoking          Allergies:  Allergies   Allergen Reactions    Aspirin Other (See Comments)     Pt reports \"makes my heart go crazy\"    Codeine GI Intolerance    Penicillins Hives    Lisinopril Cough     Scheduled Meds:  allopurinol, 200 mg, Daily  atorvastatin, 20 mg, Daily  carvedilol, 12.5 mg, BID With Meals  cetirizine, 10 mg, Daily  ferrous sulfate, 324 mg, Daily With Breakfast  hydroxyurea, 1,000 mg, BID  melatonin, 5 mg, Nightly  pantoprazole, 40 mg, Daily  pregabalin, 75 mg, Daily  senna-docusate sodium, 2 tablet, BID  sodium chloride, 10 mL, Q12H          Review of Systems:   ROS  Review of Systems   Constitution: Negative for chills and fever.   Cardiovascular: Negative for chest pain and palpitations.   Respiratory: Negative for cough and hemoptysis.    Gastrointestinal: Negative for nausea.        Constitutional:  Temp:  [97.8 øF (36.6 øC)-98.7 øF (37.1 øC)] 97.8 øF (36.6 øC)  Heart Rate:  [70-97] 84  Resp:  [19-24] 20  BP: (103-152)/(43-60) 144/49    Physical Exam   /49 (BP Location: Left arm, Patient Position: Lying)   Pulse 84   Temp 97.8 øF (36.6 øC) (Oral)   Resp 20   Ht 154.9 cm (61\")   Wt 82 kg (180 lb 12.4 oz)   SpO2 93%   BMI 34.16 kg/mý   General:  Appears in no acute distress  Eyes: Sclera is anicteric,  conjunctiva is clear   HEENT:  No JVD. Thyroid not visibly enlarged. No mucosal pallor or cyanosis  Respiratory: Respirations regular and unlabored at rest.  Bilaterally good breath sounds, with good air entry in all fields. No crackles, rubs or wheezes auscultated  Cardiovascular: S1,S2 regular rate and rhythm. No murmur, rub or gallop auscultated.  . No pretibial pitting edema  Gastrointestinal: Abdomen nondistended, soft  Musculoskeletal:  No abnormal " movements  Extremities: No digital clubbing or cyanosis  Skin: Color pink. Skin warm and dry to touch. No rashes  No xanthoma  Neuro: Alert and awake, no lateralizing deficits appreciated    INTAKE AND OUTPUT:    Intake/Output Summary (Last 24 hours) at 10/6/2023 1001  Last data filed at 10/5/2023 1500  Gross per 24 hour   Intake 240 ml   Output --   Net 240 ml         Cardiographics  Telemetry: sinus rhythm     ECG:   ECG 12 Lead Rhythm Change   Preliminary Result   HEART RATE= 64  bpm   RR Interval= 936  ms   NE Interval= 197  ms   P Horizontal Axis= 51  deg   P Front Axis= 51  deg   QRSD Interval= 100  ms   QT Interval= 409  ms   QTcB= 423  ms   QRS Axis= 59  deg   T Wave Axis= 53  deg   - NORMAL ECG -   Sinus rhythm   Electronically Signed By:    Date and Time of Study: 2023-10-05 02:32:44      ECG 12 Lead Rhythm Change   Preliminary Result   HEART RATE= 83  bpm   RR Interval= 721  ms   NE Interval= 265  ms   P Horizontal Axis= 195  deg   P Front Axis= -58  deg   QRSD Interval= 86  ms   QT Interval= 362  ms   QTcB= 426  ms   QRS Axis= 73  deg   T Wave Axis= 40  deg   - ABNORMAL ECG -   Sinus or ectopic atrial rhythm   Supraventricular bigeminy   Prolonged NE interval   Low voltage, precordial leads   Anteroseptal infarct, old   Electronically Signed By:    Date and Time of Study: 2023-10-03 13:50:19      ECG 12 Lead Chest Pain   Final Result   HEART RATE= 112  bpm   RR Interval= 552  ms   NE Interval= 266  ms   P Horizontal Axis= 207  deg   P Front Axis= 187  deg   QRSD Interval= 85  ms   QT Interval= 315  ms   QTcB= 424  ms   QRS Axis= 74  deg   T Wave Axis= 6  deg   - ABNORMAL ECG -   Sinus or ectopic atrial tachycardia   Atrial premature complexes   Prolonged NE interval   When compared with ECG of 24-Sep-2022 9:22:12,   Significant change in rhythm: previously sinus   New conduction abnormality   Electronically Signed By: Constantin Griffin (KRYSTINA) 04-Oct-2023 07:47:11   Date and Time of Study: 2023-10-03 04:56:05       SCANNED - TELEMETRY     Final Result      SCANNED - TELEMETRY     Final Result      SCANNED - TELEMETRY     Final Result      SCANNED - TELEMETRY     Final Result      SCANNED - TELEMETRY     Final Result      SCANNED - TELEMETRY     Final Result      SCANNED - TELEMETRY     Final Result      SCANNED - TELEMETRY     Final Result      SCANNED - TELEMETRY     Final Result      SCANNED - TELEMETRY     Final Result      SCANNED - TELEMETRY     Final Result      SCANNED - TELEMETRY     Final Result      SCANNED - TELEMETRY     Final Result      SCANNED - TELEMETRY     Final Result      SCANNED - TELEMETRY     Final Result        I have personally reviewed EKG    Echocardiogram: Results for orders placed during the hospital encounter of 10/03/23    Adult Transthoracic Echo Complete w/ Color, Spectral and Contrast if necessary per protocol    Interpretation Summary    Estimated right ventricular systolic pressure from tricuspid regurgitation is normal (<35 mmHg).    Conclusion      Normal LV size and contractility EF of 60 to 65%  Mild right ventricular enlargement seen.  Normal atrial size  Pulmonic valve is not well visualized.  Aortic valve is mildly calcified and sclerosed.  Dopplers did not reveal any significant abnormality.  Mitral valve, tricuspid valve appears structurally normal, mild tricuspid and mitral regurgitation seen.  Calculated RV systolic pressure of 25 mmHg.  No pericardial effusion seen.  Proximal aorta appears normal in size.      Lab Review   I have reviewed the labs  Results from last 7 days   Lab Units 10/03/23  0724 10/03/23  0524   HSTROP T ng/L 17* 18*       Results from last 7 days   Lab Units 10/06/23  0027   MAGNESIUM mg/dL 2.1       Results from last 7 days   Lab Units 10/06/23  0027   SODIUM mmol/L 138   POTASSIUM mmol/L 4.1   BUN mg/dL 30*   CREATININE mg/dL 1.69*   CALCIUM mg/dL 8.7           Results from last 7 days   Lab Units 10/06/23  0027 10/05/23  0146 10/04/23  0814   WBC  "10*3/mm3 76.00* 90.50* 83.00*   HEMOGLOBIN g/dL 7.0* 7.3* 7.5*   HEMATOCRIT % 22.1* 24.4* 24.3*   PLATELETS 10*3/mm3 64* 65* 65*       Results from last 7 days   Lab Units 10/03/23  0524   INR  1.01   APTT seconds 26.7*         RADIOLOGY:  Imaging Results (Last 24 Hours)       ** No results found for the last 24 hours. **                  )10/6/2023  MD MAKR Turcios/Transcription:   \"Dictated utilizing Dragon dictation\".   "

## 2023-10-06 NOTE — PRE-PROCEDURE NOTE
.  [facility]   Interventional Radiology H&P    Patient Name: Tricia Marroquin  : 1935  MRN: [id number]  Primary Care Physician:  Danish Herbert MD  Referring Physician: No Known Provider  Date of admission: 10/3/2023    Subjective   Subjective     HPI:  Tricia Marroquin is a 88 y.o. female with elevated WBC count    Review of Systems:   Constitutional no fever,  no weight loss       Otolaryngeal no difficulty swallowing   Cardiovascular no chest pain   Pulmonary no cough, no sputum production   Gastrointestinal no constipation, no diarrhea                         Personal History       Past Medical/Surgical History:   Past Medical History:   Diagnosis Date    Hypertension     Seasonal allergies      Past Surgical History:   Procedure Laterality Date    AORTA SURGERY      BACK SURGERY      CARDIAC CATHETERIZATION N/A 2022    Procedure: Left Heart Cath;  Surgeon: Rodriguez Glass MD;  Location: [facility] CATH INVASIVE LOCATION;  Service: Cardiovascular;  Laterality: N/A;    CORONARY ANGIOPLASTY WITH STENT PLACEMENT  2022       Social History:  reports that she quit smoking about 32 years ago. Her smoking use included cigarettes. She started smoking about 69 years ago. She has a 80.00 pack-year smoking history. She has never used smokeless tobacco. She reports that she does not drink alcohol and does not use drugs.    Medications:  Medications Prior to Admission   Medication Sig Dispense Refill Last Dose    aspirin 81 MG chewable tablet Chew 1 tablet Daily.       atorvastatin (LIPITOR) 20 MG tablet Take 1 tablet by mouth Daily. 90 tablet 3     candesartan-hydrochlorothiazide (Atacand HCT) 32-12.5 MG per tablet Take 1 tablet by mouth Daily. 30 tablet 11     carvedilol (COREG) 12.5 MG tablet Take 1 tablet by mouth 2 (Two) Times a Day With Meals. 180 tablet 1     clopidogrel (PLAVIX) 75 MG tablet Take 1 tablet by mouth Daily.       ferrous sulfate 325 (65 FE) MG tablet Take 1 tablet by  "mouth Daily With Breakfast.       fluticasone (FLONASE) 50 MCG/ACT nasal spray 2 sprays into the nostril(s) as directed by provider Daily.       loratadine (CLARITIN) 10 MG tablet Take 1 tablet by mouth Daily.       Multiple Vitamins-Minerals (Viteyes AREDS Formula) capsule Take 1 capsule by mouth Daily.       nitroglycerin (NITROSTAT) 0.4 MG SL tablet Place 1 tablet under the tongue Every 5 (Five) Minutes As Needed for Chest Pain. Take no more than 3 doses in 15 minutes.       pantoprazole (PROTONIX) 40 MG EC tablet Take 1 tablet by mouth Daily.       spironolactone (ALDACTONE) 25 MG tablet TAKE 1 TABLET BY MOUTH EVERY DAY 90 tablet 2      Current medications:  allopurinol, 200 mg, Oral, Daily  atorvastatin, 20 mg, Oral, Daily  carvedilol, 12.5 mg, Oral, BID With Meals  cetirizine, 10 mg, Oral, Daily  ferrous sulfate, 324 mg, Oral, Daily With Breakfast  hydroxyurea, 1,000 mg, Oral, BID  melatonin, 5 mg, Oral, Nightly  pantoprazole, 40 mg, Oral, Daily  pregabalin, 75 mg, Oral, Daily  senna-docusate sodium, 2 tablet, Oral, BID  sodium chloride, 10 mL, Intravenous, Q12H      Current IV drips:       Allergies:  Allergies   Allergen Reactions    Aspirin Other (See Comments)     Pt reports \"makes my heart go crazy\"    Codeine GI Intolerance    Penicillins Hives    Lisinopril Cough       Objective    Objective     Vitals:   Temp:  [97.8 øF (36.6 øC)-98.7 øF (37.1 øC)] 98 øF (36.7 øC)  Heart Rate:  [70-97] 74  Resp:  [16-24] 16  BP: (103-155)/(43-67) 155/67      Physical Exam:   Constitutional: Awake, alert, No acute distress    Respiratory: Clear to auscultation bilaterally, nonlabored respirations    Cardiovascular: RRR, no murmurs, rubs, or gallops, palpable pedal pulses bilaterally   Gastrointestinal: Positive bowel sounds, soft, nontender, nondistended        ASA SCALE ASSESSMENT:  2-Mild to moderate systemic disease, medically well controlled, with no functional limitation    MALLAMPATI CLASSIFICATION:  2-Able to " visualize the soft palate, fauces, uvula. The anterior & posterior tonsilar pillars are hidden by the tongue.       Result Review        Result Review:     Sodium   Date Value Ref Range Status   10/06/2023 138 136 - 145 mmol/L Final   10/05/2023 138 136 - 145 mmol/L Final   10/04/2023 141 136 - 145 mmol/L Final   10/03/2023 139 136 - 145 mmol/L Final       Potassium   Date Value Ref Range Status   10/06/2023 4.1 3.5 - 5.2 mmol/L Final   10/05/2023 4.0 3.5 - 5.2 mmol/L Final   10/04/2023 4.3 3.5 - 5.2 mmol/L Final   10/03/2023 4.4 3.5 - 5.2 mmol/L Final       Chloride   Date Value Ref Range Status   10/06/2023 104 98 - 107 mmol/L Final   10/05/2023 104 98 - 107 mmol/L Final   10/04/2023 106 98 - 107 mmol/L Final   10/03/2023 105 98 - 107 mmol/L Final       No results found for: PLASMABICARB    BUN   Date Value Ref Range Status   10/06/2023 30 (H) 8 - 23 mg/dL Final   10/05/2023 33 (H) 8 - 23 mg/dL Final   10/04/2023 36 (H) 8 - 23 mg/dL Final   10/03/2023 38 (H) 8 - 23 mg/dL Final       Creatinine   Date Value Ref Range Status   10/06/2023 1.69 (H) 0.57 - 1.00 mg/dL Final   10/05/2023 1.50 (H) 0.57 - 1.00 mg/dL Final   10/04/2023 1.67 (H) 0.57 - 1.00 mg/dL Final   10/03/2023 1.98 (H) 0.57 - 1.00 mg/dL Final       Calcium   Date Value Ref Range Status   10/06/2023 8.7 8.6 - 10.5 mg/dL Final   10/05/2023 9.1 8.6 - 10.5 mg/dL Final   10/04/2023 9.3 8.6 - 10.5 mg/dL Final   10/03/2023 8.8 8.6 - 10.5 mg/dL Final           No components found for: GLUCOSE.*  Results from last 7 days   Lab Units 10/06/23  0027   WBC 10*3/mm3 76.00*   HEMOGLOBIN g/dL 7.0*   HEMATOCRIT % 22.1*   PLATELETS 10*3/mm3 64*      Results from last 7 days   Lab Units 10/03/23  0524   INR  1.01           Assessment / Plan     Assesment:   Elevated WBC count      Plan:   Bone Marrow biopsy    The risks and benefits of the procedure were discussed with the patient.    Electronically signed by Deepak Pineda III, MD, 10/06/23, 12:58 PM EDT.

## 2023-10-06 NOTE — THERAPY TREATMENT NOTE
Subjective: Pt agreeable to therapeutic plan of care.    Objective:     Bed mobility - N/A or Not attempted.  Pt already up out of the bed.   Transfers - Independent  Ambulation - 150 feet Supervision    Vitals: WNL    Pain: 0 VAS      Education: Provided education on the importance of mobility in the acute care setting, Verbal/Tactile Cues, Transfer Training, and Gait Training    Assessment: Tricia Marroquin presents with functional mobility impairments which indicate the need for skilled intervention. Tolerating session today without incident. Pt is transferring and ambulating well this afternoon.  Pt should do well with return to her apartment and HHPT to follow.  Will continue to follow and progress as tolerated.     Plan/Recommendations:   Low Intensity Therapy recommended post-acute care - This is recommended as therapy feels this patient would require 2-3 visits per week. HH would be the best option . Pt requires no DME at discharge.     Pt desires Home with family assist and and Home Health at discharge. Pt cooperative; agreeable to therapeutic recommendations and plan of care.     Basic Mobility 6-click:  Rollin = Total, A lot = 2, A little = 3; 4 = None  Supine>Sit:   1 = Total, A lot = 2, A little = 3; 4 = None   Sit>Stand with arms:  1 = Total, A lot = 2, A little = 3; 4 = None  Bed>Chair:   1 = Total, A lot = 2, A little = 3; 4 = None  Ambulate in room:  1 = Total, A lot = 2, A little = 3; 4 = None  3-5 Steps with railin = Total, A lot = 2, A little = 3; 4 = None  Score: 20    Modified Mitchell: 2 = Slight disability (Able to look after own affairs without assistance, but unable to carry out all previous activities )2    Post-Tx Position: Up in Chair and Call light and personal items within reach  pt's son present  PPE: gloves

## 2023-10-06 NOTE — PROGRESS NOTES
Hematology/Oncology Inpatient Progress Note    PATIENT NAME: Tricia Marroquin  : 1935  MRN: [id number]    CHIEF COMPLAINT: shortness of breath    HISTORY OF PRESENT ILLNESS:      Tricia Marroquin is a 88 y.o. female who presented to [facility] on 10/3/2023 with complaints of difficulty breathing and irregular heartbeats.  Patient had denied chest pain but has had worsening orthopnea.  She had some back stools as well prior to presentation to the hospital.  In the emergency room she was found to have A-fib elevated proBNP, significant leukocytosis and thrombocytopenia.  She also was found to have acute kidney injury with a creatinine of 1.6.  Review of her CBC showed a white count of 68,000, hemoglobin 7.8, MCV was 106 and platelets of 58,000 with evidence of leukoerythroblastic changes, up to 22% blast forms, promyelocytes myelocytes.  PT was normal at 11, INR is 1 PTT was 26.7 and magnesium was normal at 2.4.  She has a normal calcium and iron studies were unremarkable.     Her peripheral smear also concurs with the above blast forms     10/03/23  Hematology/Oncology was consulted number cytopenia.  Patient was seen by Dr. Paez back in .  Her thrombocytopenia was dating back to  with platelet counts ranging between 57 and 10 4000 mild anemia and leukopenia.  Patient was recommended to follow-up in the outpatient setting for possible bone marrow biopsy.     He/She  has a past medical history of Hypertension and Seasonal allergies.     PCP: Danish Herbert MD  Subjective     Patient had bone marrow biopsy today which she tolerated very well.  She denies any new issues        ROS:  Review of Systems   Constitutional:  Positive for fatigue.   Neurological:  Positive for weakness.      MEDICATIONS:    Scheduled Meds:  allopurinol, 200 mg, Oral, Daily  atorvastatin, 20 mg, Oral, Daily  carvedilol, 12.5 mg, Oral, BID With Meals  cetirizine, 10 mg, Oral, Daily  ferrous sulfate, 324  "mg, Oral, Daily With Breakfast  hydroxyurea, 1,000 mg, Oral, BID  melatonin, 5 mg, Oral, Nightly  pantoprazole, 40 mg, Oral, Daily  pregabalin, 75 mg, Oral, Daily  senna-docusate sodium, 2 tablet, Oral, BID  sodium chloride, 10 mL, Intravenous, Q12H       Continuous Infusions:        PRN Meds:    acetaminophen    senna-docusate sodium **AND** polyethylene glycol **AND** bisacodyl **AND** bisacodyl    nitroglycerin    [COMPLETED] Insert Peripheral IV **AND** sodium chloride    sodium chloride    sodium chloride     ALLERGIES:    Allergies   Allergen Reactions    Aspirin Other (See Comments)     Pt reports \"makes my heart go crazy\"    Codeine GI Intolerance    Penicillins Hives    Lisinopril Cough       Objective    VITALS:   /49 (BP Location: Left arm, Patient Position: Lying)   Pulse 84   Temp 97.8 øF (36.6 øC) (Oral)   Resp 20   Ht 154.9 cm (61\")   Wt 82 kg (180 lb 12.4 oz)   SpO2 93%   BMI 34.16 kg/mý     PHYSICAL EXAM: (performed by MD)  Physical Exam  Constitutional:       Appearance: She is ill-appearing.   HENT:      Right Ear: External ear normal.      Left Ear: External ear normal.   Pulmonary:      Effort: Pulmonary effort is normal.   Neurological:      General: No focal deficit present.      Mental Status: She is oriented to person, place, and time. Mental status is at baseline.   Psychiatric:         Mood and Affect: Mood normal.         Behavior: Behavior normal.         Thought Content: Thought content normal.         Judgment: Judgment normal.         I have reexamined the patient and the results are consistent with the previously documented exam. Lynn Gutierrez MD      RECENT LABS:    Lab Results (last 24 hours)       Procedure Component Value Units Date/Time    Blood Culture - Blood, Arm, Right [[id number]]  (Normal) Collected: 10/03/23 0724    Specimen: Blood from Arm, Right Updated: 10/06/23 0730     Blood Culture No growth at 3 days    Blood Culture - Blood, Wrist, Left " [[id number]]  (Normal) Collected: 10/03/23 0700    Specimen: Blood from Wrist, Left Updated: 10/06/23 0715     Blood Culture No growth at 3 days    Narrative:      Less than seven (7) mL's of blood was collected.  Insufficient quantity may yield false negative results.    Manual Differential [[id number]]  (Abnormal) Collected: 10/06/23 0027    Specimen: Blood Updated: 10/06/23 0138     Neutrophil % 8.0 %      Lymphocyte % 4.0 %      Monocyte % 6.0 %      Bands %  1.0 %      Metamyelocyte % 4.0 %      Myelocyte % 2.0 %      Promyelocyte % 25.0 %      Atypical Lymphocyte % 1.0 %      Blasts % 49.0 %      Neutrophils Absolute 6.84 10*3/mm3      Lymphocytes Absolute 3.80 10*3/mm3      Monocytes Absolute 4.56 10*3/mm3      Anisocytosis Slight/1+     Dacrocytes Slight/1+     Macrocytes Slight/1+     Poikilocytes Slight/1+     WBC Morphology Normal     Platelet Estimate Decreased     Large Platelets Slight/1+    Narrative:      Reviewed by Pathologist within the past 30 days Electronically signed by Emile Conway MD on 10/3/2023 at 1543 ... 10/6/23 [facility]..      CBC & Differential [[id number]]  (Abnormal) Collected: 10/06/23 0027    Specimen: Blood Updated: 10/06/23 0138    Narrative:      The following orders were created for panel order CBC & Differential.  Procedure                               Abnormality         Status                     ---------                               -----------         ------                     CBC Auto Differential[[id number]]        Abnormal            Final result               Scan Slide[[id number]]                                       Final result                 Please view results for these tests on the individual orders.    Scan Slide [[id number]] Collected: 10/06/23 0027    Specimen: Blood Updated: 10/06/23 0138     Scan Slide --     Comment: See Manual Differential Results       CBC Auto Differential [[id number]]  (Abnormal) Collected: 10/06/23 0027    Specimen: Blood Updated: 10/06/23  0138     WBC 76.00 10*3/mm3      RBC 2.07 10*6/mm3      Hemoglobin 7.0 g/dL      Hematocrit 22.1 %      .1 fL      MCH 33.6 pg      MCHC 31.4 g/dL      RDW 21.5 %      RDW-SD 80.1 fl      MPV 10.1 fL      Platelets 64 10*3/mm3     Narrative:      The previously reported component NRBC is no longer being reported. Previous result was 0.1 /100 WBC (Reference Range: 0.0-0.2 /100 WBC) on 10/6/2023 at 0104 EDT.    Lactate Dehydrogenase [[id number]]  (Abnormal) Collected: 10/06/23 0027    Specimen: Blood Updated: 10/06/23 0127      U/L      Comment: Specimen hemolyzed.  Results may be affected.       Magnesium [[id number]]  (Normal) Collected: 10/06/23 0027    Specimen: Blood Updated: 10/06/23 0126     Magnesium 2.1 mg/dL     Comprehensive Metabolic Panel [[id number]]  (Abnormal) Collected: 10/06/23 0027    Specimen: Blood Updated: 10/06/23 0126     Glucose 97 mg/dL      BUN 30 mg/dL      Creatinine 1.69 mg/dL      Sodium 138 mmol/L      Potassium 4.1 mmol/L      Chloride 104 mmol/L      CO2 23.0 mmol/L      Calcium 8.7 mg/dL      Total Protein 5.8 g/dL      Albumin 3.5 g/dL      ALT (SGPT) 13 U/L      AST (SGOT) 25 U/L      Alkaline Phosphatase 59 U/L      Total Bilirubin 0.2 mg/dL      Globulin 2.3 gm/dL      A/G Ratio 1.5 g/dL      BUN/Creatinine Ratio 17.8     Anion Gap 11.0 mmol/L      eGFR 29.3 mL/min/1.73     Narrative:      GFR Normal >60  Chronic Kidney Disease <60  Kidney Failure <15    The GFR formula is only valid for adults with stable renal function between ages 18 and 70.    Uric Acid [[id number]]  (Abnormal) Collected: 10/06/23 0027    Specimen: Blood Updated: 10/06/23 0126     Uric Acid 11.3 mg/dL     Phosphorus [[id number]]  (Normal) Collected: 10/06/23 0027    Specimen: Blood Updated: 10/06/23 0126     Phosphorus 4.4 mg/dL     Blood Culture - Blood, Wrist, Left [[id number]]  (Normal) Collected: 10/03/23 2041    Specimen: Blood from Wrist, Left Updated: 10/05/23 2100     Blood Culture No growth  at 2 days            PENDING RESULTS:     IMAGING REVIEWED:  US Renal Bilateral    Result Date: 10/4/2023  Impression: 1. Increased echogenicity in both kidneys suggesting medical renal disease. 2. No evidence of obstructive uropathy. 3. 1.9 cm left renal cyst. Electronically Signed: Deepak Pineda MD  10/4/2023 11:31 AM EDT  Workstation ID: [id number]     Assessment & Plan   ASSESSMENT:    Leukocytosis with leukoerythroblastic changes up to 22% blast forms worrisome for myelo infiltrative process, AML.  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  So far coagulation panel is normal.  We will add fibrinogen level  Patient is declining bone marrow aspiration and biopsy understanding that we have a high clinical suspicion for myelo infiltrative process such as leukemia.  Sinus tachycardia with Wenckebach second-degree AV block  Acute heart failure due to diastolic dysfunction  Acute kidney injury: Nephrology consult.  Possible tumor lysis syndrome.  Creatinine has slightly improved  Hyperuricemia: Allopurinol 200 mg daily     PLANS:    Continue hydroxyurea 1 g p.o. twice a day for now.  Her counts have decreased  Allopurinol changed to 200 mg p.o. daily, uric acid is decreasing  We will send her peripheral blood for flow cytometry suspect that she has acute leukemia.  This is still pending  Bone marrow aspiration and biopsy was completed today 10/6/2023  Checked fibrinogen level, blood cultures urine cultures  No evidence of DIC  Check uric acid level, LDH, reticulocyte count  Daily check tumor lysis labs as well  Will continue to follow          Electronically signed by Lynn Gutierrez MD, 10/06/23, 6:59 PM EDT.

## 2023-10-06 NOTE — PLAN OF CARE
Goal Outcome Evaluation:     Bed mobility - N/A or Not attempted.  Pt already up out of the bed.   Transfers - Independent  Ambulation - 150 feet Supervision     Low Intensity Therapy recommended post-acute care - This is recommended as therapy feels this patient would require 2-3 visits per week. HH would be the best option . Pt requires no DME at discharge.     Pt desires Home with family assist and and Home Health at discharge. Pt cooperative; agreeable to therapeutic recommendations and plan of care.

## 2023-10-06 NOTE — PLAN OF CARE
Goal Outcome Evaluation:  Plan of Care Reviewed With: patient        Progress: no change  Outcome Evaluation: Rested well during the shift. Pt states agreeable to bone marrow biopsy. Up to BR with stand-by assist. No c/o discomfort. Will continue to monitor.

## 2023-10-07 LAB
ABO GROUP BLD: NORMAL
ALBUMIN SERPL-MCNC: 3.8 G/DL (ref 3.5–5.2)
ALBUMIN/GLOB SERPL: 1.6 G/DL
ALP SERPL-CCNC: 65 U/L (ref 39–117)
ALT SERPL W P-5'-P-CCNC: 18 U/L (ref 1–33)
ANION GAP SERPL CALCULATED.3IONS-SCNC: 10 MMOL/L (ref 5–15)
ANION GAP SERPL CALCULATED.3IONS-SCNC: 10 MMOL/L (ref 5–15)
ANISOCYTOSIS BLD QL: ABNORMAL
AST SERPL-CCNC: 28 U/L (ref 1–32)
BILIRUB SERPL-MCNC: 0.3 MG/DL (ref 0–1.2)
BLASTS NFR BLD MANUAL: 40 % (ref 0–0)
BLD GP AB SCN SERPL QL: NEGATIVE
BUN SERPL-MCNC: 32 MG/DL (ref 8–23)
BUN SERPL-MCNC: 34 MG/DL (ref 8–23)
BUN/CREAT SERPL: 25.2 (ref 7–25)
BUN/CREAT SERPL: 26.8 (ref 7–25)
CALCIUM SPEC-SCNC: 8.5 MG/DL (ref 8.6–10.5)
CALCIUM SPEC-SCNC: 8.6 MG/DL (ref 8.6–10.5)
CHLORIDE SERPL-SCNC: 103 MMOL/L (ref 98–107)
CHLORIDE SERPL-SCNC: 104 MMOL/L (ref 98–107)
CO2 SERPL-SCNC: 24 MMOL/L (ref 22–29)
CO2 SERPL-SCNC: 24 MMOL/L (ref 22–29)
CREAT SERPL-MCNC: 1.27 MG/DL (ref 0.57–1)
CREAT SERPL-MCNC: 1.27 MG/DL (ref 0.57–1)
DACRYOCYTES BLD QL SMEAR: ABNORMAL
DEPRECATED RDW RBC AUTO: 80.5 FL (ref 37–54)
EGFRCR SERPLBLD CKD-EPI 2021: 41.3 ML/MIN/1.73
EGFRCR SERPLBLD CKD-EPI 2021: 41.3 ML/MIN/1.73
EOSINOPHIL # BLD MANUAL: 1.9 10*3/MM3 (ref 0–0.4)
EOSINOPHIL NFR BLD MANUAL: 4 % (ref 0.3–6.2)
ERYTHROCYTE [DISTWIDTH] IN BLOOD BY AUTOMATED COUNT: 21.4 % (ref 12.3–15.4)
GLOBULIN UR ELPH-MCNC: 2.4 GM/DL
GLUCOSE SERPL-MCNC: 110 MG/DL (ref 65–99)
GLUCOSE SERPL-MCNC: 115 MG/DL (ref 65–99)
HCT VFR BLD AUTO: 21.2 % (ref 34–46.6)
HGB BLD-MCNC: 6.6 G/DL (ref 12–15.9)
LARGE PLATELETS: ABNORMAL
LDH SERPL-CCNC: 982 U/L (ref 135–214)
LYMPHOCYTES # BLD MANUAL: 8.09 10*3/MM3 (ref 0.7–3.1)
LYMPHOCYTES NFR BLD MANUAL: 4 % (ref 5–12)
Lab: NORMAL
MACROCYTES BLD QL SMEAR: ABNORMAL
MAGNESIUM SERPL-MCNC: 2 MG/DL (ref 1.6–2.4)
MCH RBC QN AUTO: 33.4 PG (ref 26.6–33)
MCHC RBC AUTO-ENTMCNC: 31.3 G/DL (ref 31.5–35.7)
MCV RBC AUTO: 106.9 FL (ref 79–97)
METAMYELOCYTES NFR BLD MANUAL: 4 % (ref 0–0)
MONOCYTES # BLD: 1.9 10*3/MM3 (ref 0.1–0.9)
MYELOCYTES NFR BLD MANUAL: 11 % (ref 0–0)
NEUTROPHILS # BLD AUTO: 9.04 10*3/MM3 (ref 1.7–7)
NEUTROPHILS NFR BLD MANUAL: 13 % (ref 42.7–76)
NEUTS BAND NFR BLD MANUAL: 6 % (ref 0–5)
NEUTS VAC BLD QL SMEAR: ABNORMAL
NRBC SPEC MANUAL: 2 /100 WBC (ref 0–0.2)
OVALOCYTES BLD QL SMEAR: ABNORMAL
PHOSPHATE SERPL-MCNC: 3.8 MG/DL (ref 2.5–4.5)
PLATELET # BLD AUTO: 57 10*3/MM3 (ref 140–450)
PMV BLD AUTO: 10 FL (ref 6–12)
POIKILOCYTOSIS BLD QL SMEAR: ABNORMAL
POLYCHROMASIA BLD QL SMEAR: ABNORMAL
POTASSIUM SERPL-SCNC: 4 MMOL/L (ref 3.5–5.2)
POTASSIUM SERPL-SCNC: 4.2 MMOL/L (ref 3.5–5.2)
PROMYELOCYTES NFR BLD MANUAL: 1 % (ref 0–0)
PROT SERPL-MCNC: 6.2 G/DL (ref 6–8.5)
QT INTERVAL: 409 MS
QTC INTERVAL: 423 MS
RBC # BLD AUTO: 1.98 10*6/MM3 (ref 3.77–5.28)
RH BLD: POSITIVE
SCAN SLIDE: NORMAL
SODIUM SERPL-SCNC: 137 MMOL/L (ref 136–145)
SODIUM SERPL-SCNC: 138 MMOL/L (ref 136–145)
T&S EXPIRATION DATE: NORMAL
TROPONIN T SERPL HS-MCNC: 16 NG/L
URATE SERPL-MCNC: 9.5 MG/DL (ref 2.4–5.7)
VARIANT LYMPHS NFR BLD MANUAL: 8 % (ref 19.6–45.3)
VARIANT LYMPHS NFR BLD MANUAL: 9 % (ref 0–5)
WBC NRBC COR # BLD: 47.6 10*3/MM3 (ref 3.4–10.8)

## 2023-10-07 PROCEDURE — 84484 ASSAY OF TROPONIN QUANT: CPT | Performed by: INTERNAL MEDICINE

## 2023-10-07 PROCEDURE — 86850 RBC ANTIBODY SCREEN: CPT | Performed by: INTERNAL MEDICINE

## 2023-10-07 PROCEDURE — 86901 BLOOD TYPING SEROLOGIC RH(D): CPT | Performed by: INTERNAL MEDICINE

## 2023-10-07 PROCEDURE — 83615 LACTATE (LD) (LDH) ENZYME: CPT | Performed by: INTERNAL MEDICINE

## 2023-10-07 PROCEDURE — P9016 RBC LEUKOCYTES REDUCED: HCPCS

## 2023-10-07 PROCEDURE — 86900 BLOOD TYPING SEROLOGIC ABO: CPT

## 2023-10-07 PROCEDURE — 86923 COMPATIBILITY TEST ELECTRIC: CPT

## 2023-10-07 PROCEDURE — 80053 COMPREHEN METABOLIC PANEL: CPT | Performed by: INTERNAL MEDICINE

## 2023-10-07 PROCEDURE — 85007 BL SMEAR W/DIFF WBC COUNT: CPT | Performed by: INTERNAL MEDICINE

## 2023-10-07 PROCEDURE — 36430 TRANSFUSION BLD/BLD COMPNT: CPT

## 2023-10-07 PROCEDURE — 84100 ASSAY OF PHOSPHORUS: CPT | Performed by: INTERNAL MEDICINE

## 2023-10-07 PROCEDURE — 84550 ASSAY OF BLOOD/URIC ACID: CPT | Performed by: INTERNAL MEDICINE

## 2023-10-07 PROCEDURE — 85025 COMPLETE CBC W/AUTO DIFF WBC: CPT | Performed by: INTERNAL MEDICINE

## 2023-10-07 PROCEDURE — 83735 ASSAY OF MAGNESIUM: CPT | Performed by: INTERNAL MEDICINE

## 2023-10-07 PROCEDURE — 99232 SBSQ HOSP IP/OBS MODERATE 35: CPT | Performed by: INTERNAL MEDICINE

## 2023-10-07 PROCEDURE — 86900 BLOOD TYPING SEROLOGIC ABO: CPT | Performed by: INTERNAL MEDICINE

## 2023-10-07 RX ADMIN — ALLOPURINOL 200 MG: 100 TABLET ORAL at 10:30

## 2023-10-07 RX ADMIN — ATORVASTATIN CALCIUM 20 MG: 20 TABLET, FILM COATED ORAL at 10:30

## 2023-10-07 RX ADMIN — HYDROXYUREA 1000 MG: 500 CAPSULE ORAL at 21:46

## 2023-10-07 RX ADMIN — Medication 5 MG: at 21:46

## 2023-10-07 RX ADMIN — Medication 10 ML: at 21:46

## 2023-10-07 RX ADMIN — HYDROXYUREA 1000 MG: 500 CAPSULE ORAL at 10:31

## 2023-10-07 RX ADMIN — CETIRIZINE HYDROCHLORIDE 10 MG: 10 TABLET, FILM COATED ORAL at 10:30

## 2023-10-07 RX ADMIN — FERROUS SULFATE TAB EC 324 MG (65 MG FE EQUIVALENT) 324 MG: 324 (65 FE) TABLET DELAYED RESPONSE at 10:30

## 2023-10-07 RX ADMIN — Medication 10 ML: at 10:32

## 2023-10-07 RX ADMIN — PREGABALIN 75 MG: 75 CAPSULE ORAL at 10:43

## 2023-10-07 RX ADMIN — PANTOPRAZOLE SODIUM 40 MG: 40 TABLET, DELAYED RELEASE ORAL at 10:31

## 2023-10-07 RX ADMIN — ACETAMINOPHEN 650 MG: 325 TABLET, FILM COATED ORAL at 21:46

## 2023-10-07 NOTE — PLAN OF CARE
Goal Outcome Evaluation:   Pt alert and let needs be known to staff no complain of pain or discomfort. 1 unit of PRBC given for hgb 6.6 will continue to monitor.

## 2023-10-07 NOTE — PROGRESS NOTES
"                                                                                                                                      Nephrology  Progress Note                                        [facility]    Patient Identification    Name: Tricia Marroquin  Age: 88 y.o.  Sex: female  :  1935  MRN: [id number]      DATE OF SERVICE:  10/7/2023        Subective    Feeling better        Objective   Scheduled Meds:allopurinol, 200 mg, Oral, Daily  atorvastatin, 20 mg, Oral, Daily  carvedilol, 12.5 mg, Oral, BID With Meals  cetirizine, 10 mg, Oral, Daily  ferrous sulfate, 324 mg, Oral, Daily With Breakfast  hydroxyurea, 1,000 mg, Oral, BID  melatonin, 5 mg, Oral, Nightly  pantoprazole, 40 mg, Oral, Daily  pregabalin, 75 mg, Oral, Daily  senna-docusate sodium, 2 tablet, Oral, BID  sodium chloride, 10 mL, Intravenous, Q12H          Continuous Infusions:     PRN Meds:  acetaminophen    senna-docusate sodium **AND** polyethylene glycol **AND** bisacodyl **AND** bisacodyl    nitroglycerin    [COMPLETED] Insert Peripheral IV **AND** sodium chloride    sodium chloride    sodium chloride     Exam:  /48 (BP Location: Left arm, Patient Position: Lying)   Pulse 98   Temp 97.7 øF (36.5 øC) (Oral)   Resp 23   Ht 154.9 cm (61\")   Wt 80.5 kg (177 lb 7.5 oz)   SpO2 92%   BMI 33.53 kg/mý     Intake/Output last 3 shifts:  I/O last 3 completed shifts:  In: 600 [P.O.:600]  Out: -     Intake/Output this shift:  No intake/output data recorded.    Physical exam:  General Appearance:  Alert  Head:  Normocephalic, without obvious abnormality, atraumatic  Eyes:  PERRL, conjunctiva/corneas clear     Neck:  Supple,  no adenopathy;      Lungs:  Decreased BS occasion ronchi  Heart:  Regular rate and rhythm, S1 and S2 normal  Abdomen:  Soft, non-tender, bowel sounds active   Extremities: trace edema  Pulses: 2+ and symmetric all extremities  Skin:  No rashes or lesions       Data Review:  All labs (24hrs): "   Recent Results (from the past 24 hour(s))   ECG 12 Lead Chest Pain    Collection Time: 10/06/23  4:09 PM   Result Value Ref Range    QT Interval 389 ms    QTC Interval 430 ms   High Sensitivity Troponin T    Collection Time: 10/06/23  4:53 PM    Specimen: Blood   Result Value Ref Range    HS Troponin T 19 (H) <10 ng/L   High Sensitivity Troponin T 2Hr    Collection Time: 10/06/23  8:41 PM    Specimen: Blood   Result Value Ref Range    HS Troponin T 18 (H) <10 ng/L    Troponin T Delta -1 >=-4 - <+4 ng/L          Imaging:  IR Bone marrow biopsy and aspiration    Result Date: 10/6/2023  Impression: Successful bone marrow aspiration and biopsy under fluoroscopy Electronically Signed: Deepak Pineda MD  10/6/2023 1:33 PM EDT  Workstation ID: [id number]    US Renal Bilateral    Result Date: 10/4/2023  Impression: 1. Increased echogenicity in both kidneys suggesting medical renal disease. 2. No evidence of obstructive uropathy. 3. 1.9 cm left renal cyst. Electronically Signed: Deepak Pineda MD  10/4/2023 11:31 AM EDT  Workstation ID: [id number]    XR Chest 1 View    Result Date: 10/3/2023  Impression: No active disease Electronically Signed: Francis Shah MD  10/3/2023 5:42 AM EDT  Workstation ID: [id number]     Assessment/Plan:     Dyspnea    Shortness of breath    Coronary artery disease involving native coronary artery of native heart with unstable angina pectoris    Essential hypertension    Dyslipidemia    Thrombocytopenia    Dizziness    History of MI (myocardial infarction)    Leukocytosis    Symptomatic anemia       Acute kidney injury likely secondary to prerenal component  Check arrhythmias  Hypertension  Coronary artery disease  Leukocytosis  Possible GI bleed  Left renal cyst     Recommendations:  Noted Hb  Check BMP  Noted renal ultrasound  Gentle hydration  Control heart rate  Correct electrolyte

## 2023-10-07 NOTE — PROGRESS NOTES
[facility]   Daily Progress Note    Patient Name: Tricia Marroquin  : 1935  MRN: [id number]  Primary Care Physician:  Danish Herbert MD  Date of admission: 10/3/2023  Date and Time of Service: 10/7/23    Brief clinical summary:  88-year-old female with history of HTN, HLD, PAD, CAD with stenting on Plavix and aspirin presented to the hospital with complaints of palpitations and SOB. Cardio saw pt, questions afib but thinks abnormal rhythm is more consistent with sinus tach with intermittent second degree block (Wenckebach). She was also noted to have a dark stool, however, has been taking oral iron supplementation over the last month. So GI is not planning any acute interventions. Hemonc consulted for abnormal cytopenias (leucocytosis with circulating blast, anemia and thrombocytopenia). Flow cytometry confirms 22% blasts. She completed bone marrow biopsy on 10/6, result pending    Subjective      Patient reports no new concerns.  She is cheerful and comfortable.  Had hoped she could be discharged, however hemoglobin this morning 6.6 and she will need blood transfusion    Objective      Vitals:   Temp:  [97.7 øF (36.5 øC)-98.7 øF (37.1 øC)] 98.7 øF (37.1 øC)  Heart Rate:  [68-98] 76  Resp:  [15-25] 18  BP: (105-155)/(37-67) 128/43    Physical Exam   General: normal interaction, not in distress  Mental status: Alert and oriented x3  Head: Normocephalic and atraumatic  Eyes: EOMI, nonicteric  CVS: regular heart sounds, not tachycardic, no gallops  Respiration: Nonlabored breathing, clear to auscultation  GI: Soft, nondistended, nontender, bowel sounds present  Extremity: No leg edema, no gross deformities  Neurology: Moves all extremities, no facial droop, normal speech  Psychiatry: Normal interaction, normal affect     Result Review    Result Review:  I have personally reviewed the results from the time of this admission to 10/7/2023 11:11 EDT and agree with these  findings:  [x]  Laboratory  []  Microbiology  []  Radiology  [x]  EKG/Telemetry   []  Cardiology/Vascular   []  Pathology  []  Old records  []  Other:    Assessment & Plan      allopurinol, 200 mg, Oral, Daily  atorvastatin, 20 mg, Oral, Daily  carvedilol, 12.5 mg, Oral, BID With Meals  cetirizine, 10 mg, Oral, Daily  ferrous sulfate, 324 mg, Oral, Daily With Breakfast  hydroxyurea, 1,000 mg, Oral, BID  melatonin, 5 mg, Oral, Nightly  pantoprazole, 40 mg, Oral, Daily  pregabalin, 75 mg, Oral, Daily  senna-docusate sodium, 2 tablet, Oral, BID  sodium chloride, 10 mL, Intravenous, Q12H               Active Hospital Problems:  Active Hospital Problems    Diagnosis     **Dyspnea     Dizziness     History of MI (myocardial infarction)     Leukocytosis     Symptomatic anemia     Dyslipidemia     Thrombocytopenia     Essential hypertension     Coronary artery disease involving native coronary artery of native heart with unstable angina pectoris     Shortness of breath      Plan:   #Supraventricular tachycardia, suspected A-fib with RVR versus sinus tach with PACs and Wenckebach  -Presently rate controlled off Cardizem drip  -Continue Coreg  -Cardiology following    #Leukocytosis with circulating blasts suspicious of myeloproliferative disease  #Anemia with Hgb drop to 6.6  #Thrombocytopenia  -Hematology following  -Transfuse 1 u prbc to maintain Hgb >7    #Acute kidney injury, prerenal due to dehydration  -Serum creatinine 1.98-->1.5-->1.69 (previously <1 in 2020)  -Renal ultrasound showed medical renal disease, no obstructive uropathy  -Hold home Atacand and Aldactone  -Monitor renal function  -Nephrology following    #Left renal cyst, 1.9 cm, incidental finding on ultrasound    #CAD  -Continue atorvastatin, Coreg, Imdur  -Aspirin and Plavix on hold due to risk of bleeding in the context of thrombocytopenia    #Insomnia brought on by peripheral neuropathy  -Reports relief from melatonin and Lyrica, continue both     DVT  prophylaxis:  Mechanical DVT prophylaxis orders are present.    CODE STATUS:    Medical Intervention Limits: NO intubation (DNI)  Level Of Support Discussed With: Patient  Code Status (Patient has no pulse and is not breathing): No CPR (Do Not Attempt to Resuscitate)  Medical Interventions (Patient has pulse or is breathing): Limited Support      Disposition:  I expect patient to be discharged 2-3 days      Electronically signed by Monet Bonner MD, 10/07/23, 11:11 EDT.  [facility] Hospitalist Team

## 2023-10-07 NOTE — PROGRESS NOTES
Hematology/Oncology Inpatient Progress Note    PATIENT NAME: Tricia Marroquin  : 1935  MRN: [id number]    CHIEF COMPLAINT: Shortness of breath      HISTORY OF PRESENT ILLNESS:      Tricia Marroquin is a 88 y.o. female who presented to [facility] on 10/3/2023 with complaints of difficulty breathing and irregular heartbeats.  Patient had denied chest pain but has had worsening orthopnea.  She had some back stools as well prior to presentation to the hospital.  In the emergency room she was found to have A-fib elevated proBNP, significant leukocytosis and thrombocytopenia.  She also was found to have acute kidney injury with a creatinine of 1.6.  Review of her CBC showed a white count of 68,000, hemoglobin 7.8, MCV was 106 and platelets of 58,000 with evidence of leukoerythroblastic changes, up to 22% blast forms, promyelocytes myelocytes.  PT was normal at 11, INR is 1 PTT was 26.7 and magnesium was normal at 2.4.  She has a normal calcium and iron studies were unremarkable.     Her peripheral smear also concurs with the above blast forms     10/03/23  Hematology/Oncology was consulted number cytopenia.  Patient was seen by Dr. Paez back in .  Her thrombocytopenia was dating back to  with platelet counts ranging between 57 and 10 4000 mild anemia and leukopenia.  Patient was recommended to follow-up in the outpatient setting for possible bone marrow biopsy.     He/She  has a past medical history of Hypertension and Seasonal allergies.     PCP: Danish Herbert MD      Subjective       Patient denies any new issues.  She is receiving packed red blood cells today      ROS:  Review of Systems   Constitutional:  Positive for fatigue.   Neurological:  Positive for weakness.        MEDICATIONS:    Scheduled Meds:  allopurinol, 200 mg, Oral, Daily  atorvastatin, 20 mg, Oral, Daily  carvedilol, 12.5 mg, Oral, BID With Meals  cetirizine, 10 mg, Oral, Daily  ferrous sulfate, 324 mg, Oral,  "Daily With Breakfast  hydroxyurea, 1,000 mg, Oral, BID  melatonin, 5 mg, Oral, Nightly  pantoprazole, 40 mg, Oral, Daily  pregabalin, 75 mg, Oral, Daily  senna-docusate sodium, 2 tablet, Oral, BID  sodium chloride, 10 mL, Intravenous, Q12H       Continuous Infusions:        PRN Meds:    acetaminophen    senna-docusate sodium **AND** polyethylene glycol **AND** bisacodyl **AND** bisacodyl    nitroglycerin    [COMPLETED] Insert Peripheral IV **AND** sodium chloride    sodium chloride    sodium chloride     ALLERGIES:    Allergies   Allergen Reactions    Aspirin Other (See Comments)     Pt reports \"makes my heart go crazy\"    Codeine GI Intolerance    Penicillins Hives    Lisinopril Cough       Objective    VITALS:   /43 (BP Location: Left arm, Patient Position: Lying)   Pulse 76   Temp 98.7 øF (37.1 øC) (Oral)   Resp 18   Ht 154.9 cm (61\")   Wt 80.5 kg (177 lb 7.5 oz)   SpO2 93%   BMI 33.53 kg/mý     PHYSICAL EXAM: (performed by MD)  Physical Exam  Constitutional:       Appearance: She is ill-appearing.   HENT:      Right Ear: External ear normal.      Left Ear: External ear normal.   Pulmonary:      Effort: Pulmonary effort is normal.   Neurological:      General: No focal deficit present.      Mental Status: She is oriented to person, place, and time. Mental status is at baseline.   Psychiatric:         Mood and Affect: Mood normal.         Behavior: Behavior normal.         Thought Content: Thought content normal.         Judgment: Judgment normal.           I have reexamined the patient and the results are consistent with the previously documented exam. Lynn Gutierrez MD      RECENT LABS:    Lab Results (last 24 hours)       Procedure Component Value Units Date/Time    Uric Acid [[id number]]  (Abnormal) Collected: 10/07/23 0947    Specimen: Blood Updated: 10/07/23 1141     Uric Acid 9.5 mg/dL     Comprehensive Metabolic Panel [[id number]]  (Abnormal) Collected: 10/07/23 0947    Specimen: Blood " Updated: 10/07/23 1114     Glucose 110 mg/dL      BUN 32 mg/dL      Creatinine 1.27 mg/dL      Sodium 138 mmol/L      Potassium 4.2 mmol/L      Comment: Slight hemolysis detected by analyzer. Results may be affected.        Chloride 104 mmol/L      CO2 24.0 mmol/L      Calcium 8.6 mg/dL      Total Protein 6.2 g/dL      Albumin 3.8 g/dL      ALT (SGPT) 18 U/L      AST (SGOT) 28 U/L      Alkaline Phosphatase 65 U/L      Total Bilirubin 0.3 mg/dL      Globulin 2.4 gm/dL      A/G Ratio 1.6 g/dL      BUN/Creatinine Ratio 25.2     Anion Gap 10.0 mmol/L      eGFR 41.3 mL/min/1.73     Narrative:      GFR Normal >60  Chronic Kidney Disease <60  Kidney Failure <15    The GFR formula is only valid for adults with stable renal function between ages 18 and 70.    High Sensitivity Troponin T [[id number]]  (Abnormal) Collected: 10/07/23 0947    Specimen: Blood Updated: 10/07/23 1108     HS Troponin T 16 ng/L     Narrative:      High Sensitive Troponin T Reference Range:  <10.0 ng/L- Negative Female for AMI  <15.0 ng/L- Negative Male for AMI  >=10 - Abnormal Female indicating possible myocardial injury.  >=15 - Abnormal Male indicating possible myocardial injury.   Clinicians would have to utilize clinical acumen, EKG, Troponin, and serial changes to determine if it is an Acute Myocardial Infarction or myocardial injury due to an underlying chronic condition.         Magnesium [[id number]]  (Normal) Collected: 10/07/23 0947    Specimen: Blood Updated: 10/07/23 1048     Magnesium 2.0 mg/dL     Phosphorus [[id number]]  (Normal) Collected: 10/07/23 0947    Specimen: Blood Updated: 10/07/23 1045     Phosphorus 3.8 mg/dL     CBC Auto Differential [[id number]]  (Abnormal) Collected: 10/07/23 0948    Specimen: Blood Updated: 10/07/23 1033     WBC 47.60 10*3/mm3      RBC 1.98 10*6/mm3      Hemoglobin 6.6 g/dL      Hematocrit 21.2 %      .9 fL      MCH 33.4 pg      MCHC 31.3 g/dL      RDW 21.4 %      RDW-SD 80.5 fl      MPV 10.0 fL       Platelets 57 10*3/mm3      nRBC 0.2 /100 WBC     CBC & Differential [[id number]]  (Abnormal) Collected: 10/07/23 0948    Specimen: Blood Updated: 10/07/23 1027    Narrative:      The following orders were created for panel order CBC & Differential.  Procedure                               Abnormality         Status                     ---------                               -----------         ------                     CBC Auto Differential[[id number]]        Abnormal            Preliminary result         Scan Slide[[id number]]                                       In process                   Please view results for these tests on the individual orders.    Scan Slide [[id number]] Collected: 10/07/23 0948    Specimen: Blood Updated: 10/07/23 1027    Lactate Dehydrogenase [[id number]] Collected: 10/07/23 0947    Specimen: Blood Updated: 10/07/23 1018    Blood Culture - Blood, Arm, Right [[id number]]  (Normal) Collected: 10/03/23 0724    Specimen: Blood from Arm, Right Updated: 10/07/23 0730     Blood Culture No growth at 4 days    Blood Culture - Blood, Wrist, Left [[id number]]  (Normal) Collected: 10/03/23 0700    Specimen: Blood from Wrist, Left Updated: 10/07/23 0715     Blood Culture No growth at 4 days    Narrative:      Less than seven (7) mL's of blood was collected.  Insufficient quantity may yield false negative results.    High Sensitivity Troponin T 2Hr [[id number]]  (Abnormal) Collected: 10/06/23 2041    Specimen: Blood Updated: 10/06/23 2140     HS Troponin T 18 ng/L      Troponin T Delta -1 ng/L     Narrative:      High Sensitive Troponin T Reference Range:  <10.0 ng/L- Negative Female for AMI  <15.0 ng/L- Negative Male for AMI  >=10 - Abnormal Female indicating possible myocardial injury.  >=15 - Abnormal Male indicating possible myocardial injury.   Clinicians would have to utilize clinical acumen, EKG, Troponin, and serial changes to determine if it is an Acute Myocardial Infarction or myocardial injury due  to an underlying chronic condition.         Blood Culture - Blood, Wrist, Left [[id number]]  (Normal) Collected: 10/03/23 2041    Specimen: Blood from Wrist, Left Updated: 10/06/23 2100     Blood Culture No growth at 3 days    High Sensitivity Troponin T [[id number]]  (Abnormal) Collected: 10/06/23 1653    Specimen: Blood Updated: 10/06/23 1739     HS Troponin T 19 ng/L     Narrative:      High Sensitive Troponin T Reference Range:  <10.0 ng/L- Negative Female for AMI  <15.0 ng/L- Negative Male for AMI  >=10 - Abnormal Female indicating possible myocardial injury.  >=15 - Abnormal Male indicating possible myocardial injury.   Clinicians would have to utilize clinical acumen, EKG, Troponin, and serial changes to determine if it is an Acute Myocardial Infarction or myocardial injury due to an underlying chronic condition.         Flow Cytometry [[id number]] Collected: 10/06/23 1258    Specimen: Body Fluid from Iliac Crest, Right - Aspirate Updated: 10/06/23 1341    [employer] ([facility]) [[id number]] Collected: 10/06/23 1258    Specimen: Bone Marrow from Iliac Crest, Right - Biopsy Updated: 10/06/23 1341    Bone Marrow Exam [[id number]] Collected: 10/06/23 1258    Specimen: Bone Marrow Aspirate from Iliac Crest, Right - Biopsy Updated: 10/06/23 1313    Narrative:      The following orders were created for panel order Bone Marrow Exam.  Procedure                               Abnormality         Status                     ---------                               -----------         ------                     Bone Marrow Exam[[id number]]                                 In process                   Please view results for these tests on the individual orders.    Bone Marrow Exam [[id number]] Collected: 10/06/23 1258    Specimen: Bone Marrow Aspirate from Iliac Crest, Right - Aspirate; Bone Marrow Aspirate from Iliac Crest, Right - Aspirate; Bone Marrow Aspirate from Iliac Crest, Right - Biopsy Updated: 10/06/23 1313             PENDING RESULTS:     IMAGING REVIEWED:  IR Bone marrow biopsy and aspiration    Result Date: 10/6/2023  Impression: Successful bone marrow aspiration and biopsy under fluoroscopy Electronically Signed: Deepak Pineda MD  10/6/2023 1:33 PM EDT  Workstation ID: [id number]     Assessment & Plan   ASSESSMENT:    Leukocytosis with leukoerythroblastic changes up to 22% blast forms worrisome for myelo infiltrative process, AML.  Flow cytometry still pending.  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  So far coagulation panel is normal.  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  Possible tumor lysis syndrome.  Creatinine has slightly improved  Hyperuricemia: Allopurinol 200 mg daily.  Uric acid level is beginning to decrease     PLANS:    Continue hydroxyurea 1 g p.o. twice a day for now.  Her counts have decreased  Continue allopurinol  200 mg p.o. daily, uric acid is decreasing  We will send her peripheral blood for flow cytometry suspect that she has acute leukemia.  This is pending  Bone marrow aspiration and biopsy was completed today 10/6/2023  Checked fibrinogen level, blood cultures urine cultures  No evidence of DIC  Check uric acid level, LDH, reticulocyte count  Daily check tumor lysis labs as well  Will continue to follow            Electronically signed by Lynn Gutierrez MD, 10/07/23, 2:22 PM EDT.

## 2023-10-07 NOTE — PLAN OF CARE
Goal Outcome Evaluation:  Plan of Care Reviewed With: patient        Problem: Adult Inpatient Plan of Care  Goal: Plan of Care Review  10/7/2023 0607 by Mily Bender LPN  Outcome: Ongoing, Progressing  Flowsheets (Taken 10/7/2023 0607)  Progress: improving  Plan of Care Reviewed With: patient     Progress: improving

## 2023-10-08 LAB
ANION GAP SERPL CALCULATED.3IONS-SCNC: 12 MMOL/L (ref 5–15)
ANISOCYTOSIS BLD QL: ABNORMAL
BACTERIA SPEC AEROBE CULT: NORMAL
BH BB BLOOD EXPIRATION DATE: NORMAL
BH BB BLOOD TYPE BARCODE: 6200
BH BB DISPENSE STATUS: NORMAL
BH BB PRODUCT CODE: NORMAL
BH BB UNIT NUMBER: NORMAL
BLASTS NFR BLD MANUAL: 54 % (ref 0–0)
BUN SERPL-MCNC: 32 MG/DL (ref 8–23)
BUN/CREAT SERPL: 29.1 (ref 7–25)
CALCIUM SPEC-SCNC: 8.7 MG/DL (ref 8.6–10.5)
CHLORIDE SERPL-SCNC: 104 MMOL/L (ref 98–107)
CO2 SERPL-SCNC: 22 MMOL/L (ref 22–29)
CREAT SERPL-MCNC: 1.1 MG/DL (ref 0.57–1)
CROSSMATCH INTERPRETATION: NORMAL
DEPRECATED RDW RBC AUTO: 77.9 FL (ref 37–54)
EGFRCR SERPLBLD CKD-EPI 2021: 49 ML/MIN/1.73
ERYTHROCYTE [DISTWIDTH] IN BLOOD BY AUTOMATED COUNT: 23 % (ref 12.3–15.4)
GLUCOSE SERPL-MCNC: 89 MG/DL (ref 65–99)
HCT VFR BLD AUTO: 25.2 % (ref 34–46.6)
HGB BLD-MCNC: 8.3 G/DL (ref 12–15.9)
LARGE PLATELETS: ABNORMAL
LDH SERPL-CCNC: 903 U/L (ref 135–214)
LYMPHOCYTES # BLD MANUAL: 5.16 10*3/MM3 (ref 0.7–3.1)
LYMPHOCYTES NFR BLD MANUAL: 12 % (ref 5–12)
MAGNESIUM SERPL-MCNC: 2.1 MG/DL (ref 1.6–2.4)
MCH RBC QN AUTO: 32.9 PG (ref 26.6–33)
MCHC RBC AUTO-ENTMCNC: 32.8 G/DL (ref 31.5–35.7)
MCV RBC AUTO: 100.3 FL (ref 79–97)
METAMYELOCYTES NFR BLD MANUAL: 2 % (ref 0–0)
MONOCYTES # BLD: 4.76 10*3/MM3 (ref 0.1–0.9)
NEUTROPHILS # BLD AUTO: 6.35 10*3/MM3 (ref 1.7–7)
NEUTROPHILS NFR BLD MANUAL: 13 % (ref 42.7–76)
NEUTS BAND NFR BLD MANUAL: 3 % (ref 0–5)
NRBC SPEC MANUAL: 2 /100 WBC (ref 0–0.2)
PHOSPHATE SERPL-MCNC: 4.1 MG/DL (ref 2.5–4.5)
PLATELET # BLD AUTO: 49 10*3/MM3 (ref 140–450)
PMV BLD AUTO: 9.6 FL (ref 6–12)
POTASSIUM SERPL-SCNC: 4.1 MMOL/L (ref 3.5–5.2)
PROMYELOCYTES NFR BLD MANUAL: 3 % (ref 0–0)
RBC # BLD AUTO: 2.51 10*6/MM3 (ref 3.77–5.28)
SCAN SLIDE: NORMAL
SMALL PLATELETS BLD QL SMEAR: ABNORMAL
SODIUM SERPL-SCNC: 138 MMOL/L (ref 136–145)
UNIT  ABO: NORMAL
UNIT  RH: NORMAL
URATE SERPL-MCNC: 8.5 MG/DL (ref 2.4–5.7)
VARIANT LYMPHS NFR BLD MANUAL: 13 % (ref 19.6–45.3)
WBC MORPH BLD: NORMAL
WBC NRBC COR # BLD: 39.7 10*3/MM3 (ref 3.4–10.8)

## 2023-10-08 PROCEDURE — 84100 ASSAY OF PHOSPHORUS: CPT | Performed by: INTERNAL MEDICINE

## 2023-10-08 PROCEDURE — 97116 GAIT TRAINING THERAPY: CPT

## 2023-10-08 PROCEDURE — 85007 BL SMEAR W/DIFF WBC COUNT: CPT | Performed by: INTERNAL MEDICINE

## 2023-10-08 PROCEDURE — 85025 COMPLETE CBC W/AUTO DIFF WBC: CPT | Performed by: INTERNAL MEDICINE

## 2023-10-08 PROCEDURE — 97112 NEUROMUSCULAR REEDUCATION: CPT

## 2023-10-08 PROCEDURE — 84550 ASSAY OF BLOOD/URIC ACID: CPT | Performed by: INTERNAL MEDICINE

## 2023-10-08 PROCEDURE — 83615 LACTATE (LD) (LDH) ENZYME: CPT | Performed by: INTERNAL MEDICINE

## 2023-10-08 PROCEDURE — 99232 SBSQ HOSP IP/OBS MODERATE 35: CPT | Performed by: INTERNAL MEDICINE

## 2023-10-08 PROCEDURE — 83735 ASSAY OF MAGNESIUM: CPT | Performed by: INTERNAL MEDICINE

## 2023-10-08 PROCEDURE — 80048 BASIC METABOLIC PNL TOTAL CA: CPT | Performed by: INTERNAL MEDICINE

## 2023-10-08 RX ADMIN — FERROUS SULFATE TAB EC 324 MG (65 MG FE EQUIVALENT) 324 MG: 324 (65 FE) TABLET DELAYED RESPONSE at 10:47

## 2023-10-08 RX ADMIN — PREGABALIN 75 MG: 75 CAPSULE ORAL at 10:54

## 2023-10-08 RX ADMIN — HYDROXYUREA 1000 MG: 500 CAPSULE ORAL at 20:25

## 2023-10-08 RX ADMIN — ALLOPURINOL 200 MG: 100 TABLET ORAL at 10:47

## 2023-10-08 RX ADMIN — DOCUSATE SODIUM 50 MG AND SENNOSIDES 8.6 MG 1 TABLET: 8.6; 5 TABLET, FILM COATED ORAL at 20:26

## 2023-10-08 RX ADMIN — HYDROXYUREA 1000 MG: 500 CAPSULE ORAL at 10:49

## 2023-10-08 RX ADMIN — Medication 5 MG: at 20:25

## 2023-10-08 RX ADMIN — CARVEDILOL 12.5 MG: 6.25 TABLET, FILM COATED ORAL at 18:44

## 2023-10-08 RX ADMIN — ATORVASTATIN CALCIUM 20 MG: 20 TABLET, FILM COATED ORAL at 10:47

## 2023-10-08 RX ADMIN — DOCUSATE SODIUM 50 MG AND SENNOSIDES 8.6 MG 2 TABLET: 8.6; 5 TABLET, FILM COATED ORAL at 10:47

## 2023-10-08 RX ADMIN — CETIRIZINE HYDROCHLORIDE 10 MG: 10 TABLET, FILM COATED ORAL at 10:47

## 2023-10-08 RX ADMIN — Medication 10 ML: at 20:25

## 2023-10-08 RX ADMIN — Medication 10 ML: at 10:55

## 2023-10-08 RX ADMIN — ACETAMINOPHEN 650 MG: 325 TABLET, FILM COATED ORAL at 20:25

## 2023-10-08 RX ADMIN — PANTOPRAZOLE SODIUM 40 MG: 40 TABLET, DELAYED RELEASE ORAL at 10:47

## 2023-10-08 NOTE — PLAN OF CARE
Assessment: Tricia Marroquin presents with functional mobility impairments which indicate the need for skilled intervention. Tolerating session today without incident. Patient doing very well with all mobility. 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 Had her bone marrow biopsy and awaiting results. Has rollator at home. Plans on dc home with some family assist.Will continue to follow and progress as tolerated.

## 2023-10-08 NOTE — PROGRESS NOTES
Hematology/Oncology Inpatient Progress Note    PATIENT NAME: Tricia Marroquin  : 1935  MRN: [id number]    CHIEF COMPLAINT: Shortness of breath      HISTORY OF PRESENT ILLNESS:      Tricia Marroquin is a 88 y.o. female who presented to [facility] on 10/3/2023 with complaints of difficulty breathing and irregular heartbeats.  Patient had denied chest pain but has had worsening orthopnea.  She had some back stools as well prior to presentation to the hospital.  In the emergency room she was found to have A-fib elevated proBNP, significant leukocytosis and thrombocytopenia.  She also was found to have acute kidney injury with a creatinine of 1.6.  Review of her CBC showed a white count of 68,000, hemoglobin 7.8, MCV was 106 and platelets of 58,000 with evidence of leukoerythroblastic changes, up to 22% blast forms, promyelocytes myelocytes.  PT was normal at 11, INR is 1 PTT was 26.7 and magnesium was normal at 2.4.  She has a normal calcium and iron studies were unremarkable.     Her peripheral smear also concurs with the above blast forms     10/03/23  Hematology/Oncology was consulted number cytopenia.  Patient was seen by Dr. Paez back in .  Her thrombocytopenia was dating back to  with platelet counts ranging between 57 and 10 4000 mild anemia and leukopenia.  Patient was recommended to follow-up in the outpatient setting for possible bone marrow biopsy.     He/She  has a past medical history of Hypertension and Seasonal allergies.     PCP: Danish Herbert MD      Subjective     Patient denies any new issues        ROS:  Review of Systems   Constitutional:  Positive for fatigue.   Neurological:  Positive for weakness.        MEDICATIONS:    Scheduled Meds:  allopurinol, 200 mg, Oral, Daily  atorvastatin, 20 mg, Oral, Daily  carvedilol, 12.5 mg, Oral, BID With Meals  cetirizine, 10 mg, Oral, Daily  ferrous sulfate, 324 mg, Oral, Daily With Breakfast  hydroxyurea, 1,000 mg,  "Oral, BID  melatonin, 5 mg, Oral, Nightly  pantoprazole, 40 mg, Oral, Daily  pregabalin, 75 mg, Oral, Daily  senna-docusate sodium, 2 tablet, Oral, BID  sodium chloride, 10 mL, Intravenous, Q12H       Continuous Infusions:        PRN Meds:    acetaminophen    senna-docusate sodium **AND** polyethylene glycol **AND** bisacodyl **AND** bisacodyl    nitroglycerin    [COMPLETED] Insert Peripheral IV **AND** sodium chloride    sodium chloride    sodium chloride     ALLERGIES:    Allergies   Allergen Reactions    Aspirin Other (See Comments)     Pt reports \"makes my heart go crazy\"    Codeine GI Intolerance    Penicillins Hives    Lisinopril Cough       Objective    VITALS:   /50 (BP Location: Left arm, Patient Position: Lying)   Pulse 72   Temp 99.9 øF (37.7 øC) (Oral)   Resp 16   Ht 154.9 cm (61\")   Wt 80.5 kg (177 lb 7.5 oz)   SpO2 95%   BMI 33.53 kg/mý     PHYSICAL EXAM: (performed by MD)  Physical Exam  Constitutional:       Appearance: She is ill-appearing.   HENT:      Right Ear: External ear normal.      Left Ear: External ear normal.   Pulmonary:      Effort: Pulmonary effort is normal.   Neurological:      General: No focal deficit present.      Mental Status: She is oriented to person, place, and time. Mental status is at baseline.   Psychiatric:         Mood and Affect: Mood normal.         Behavior: Behavior normal.         Thought Content: Thought content normal.         Judgment: Judgment normal.           I have reexamined the patient and the results are consistent with the previously documented exam. Lynn Gutierrez MD      RECENT LABS:    Lab Results (last 24 hours)       Procedure Component Value Units Date/Time    Blood Culture - Blood, Arm, Right [[id number]]  (Normal) Collected: 10/03/23 0724    Specimen: Blood from Arm, Right Updated: 10/08/23 0730     Blood Culture No growth at 5 days    Blood Culture - Blood, Wrist, Left [[id number]]  (Normal) Collected: 10/03/23 0700    " Specimen: Blood from Wrist, Left Updated: 10/08/23 0715     Blood Culture No growth at 5 days    Narrative:      Less than seven (7) mL's of blood was collected.  Insufficient quantity may yield false negative results.    Manual Differential [[id number]]  (Abnormal) Collected: 10/08/23 0323    Specimen: Blood Updated: 10/08/23 0517     Neutrophil % 13.0 %      Lymphocyte % 13.0 %      Monocyte % 12.0 %      Bands %  3.0 %      Metamyelocyte % 2.0 %      Promyelocyte % 3.0 %      Blasts % 54.0 %      Neutrophils Absolute 6.35 10*3/mm3      Lymphocytes Absolute 5.16 10*3/mm3      Monocytes Absolute 4.76 10*3/mm3      nRBC 2.0 /100 WBC      Anisocytosis Slight/1+     WBC Morphology Normal     Platelet Estimate Decreased     Large Platelets Slight/1+    Narrative:      Reviewed by Pathologist within the past 30 days on 10.03.2023.      CBC & Differential [[id number]]  (Abnormal) Collected: 10/08/23 0323    Specimen: Blood Updated: 10/08/23 0517    Narrative:      The following orders were created for panel order CBC & Differential.  Procedure                               Abnormality         Status                     ---------                               -----------         ------                     CBC Auto Differential[[id number]]        Abnormal            Final result               Scan Slide[[id number]]                                       Final result                 Please view results for these tests on the individual orders.    CBC Auto Differential [[id number]]  (Abnormal) Collected: 10/08/23 0323    Specimen: Blood Updated: 10/08/23 0517     WBC 39.70 10*3/mm3      RBC 2.51 10*6/mm3      Hemoglobin 8.3 g/dL      Hematocrit 25.2 %      .3 fL      MCH 32.9 pg      MCHC 32.8 g/dL      RDW 23.0 %      RDW-SD 77.9 fl      MPV 9.6 fL      Platelets 49 10*3/mm3     Narrative:      The previously reported component NRBC is no longer being reported. Previous result was 0.1 /100 WBC (Reference Range: 0.0-0.2  /100 WBC) on 10/8/2023 at 0435 EDT.    Scan Slide [[id number]] Collected: 10/08/23 0323    Specimen: Blood Updated: 10/08/23 0517     Scan Slide --     Comment: See Manual Differential Results       Phosphorus [[id number]]  (Normal) Collected: 10/08/23 0323    Specimen: Blood Updated: 10/08/23 0430     Phosphorus 4.1 mg/dL     Uric Acid [[id number]]  (Abnormal) Collected: 10/08/23 0323    Specimen: Blood Updated: 10/08/23 0430     Uric Acid 8.5 mg/dL     Lactate Dehydrogenase [[id number]]  (Abnormal) Collected: 10/08/23 0323    Specimen: Blood Updated: 10/08/23 0430      U/L     Magnesium [[id number]]  (Normal) Collected: 10/08/23 0323    Specimen: Blood Updated: 10/08/23 0430     Magnesium 2.1 mg/dL     Blood Culture - Blood, Wrist, Left [[id number]]  (Normal) Collected: 10/03/23 2041    Specimen: Blood from Wrist, Left Updated: 10/07/23 2100     Blood Culture No growth at 4 days    Basic Metabolic Panel [[id number]]  (Abnormal) Collected: 10/07/23 1512    Specimen: Blood Updated: 10/07/23 1551     Glucose 115 mg/dL      BUN 34 mg/dL      Creatinine 1.27 mg/dL      Sodium 137 mmol/L      Potassium 4.0 mmol/L      Comment: Slight hemolysis detected by analyzer. Results may be affected.        Chloride 103 mmol/L      CO2 24.0 mmol/L      Calcium 8.5 mg/dL      BUN/Creatinine Ratio 26.8     Anion Gap 10.0 mmol/L      eGFR 41.3 mL/min/1.73     Narrative:      GFR Normal >60  Chronic Kidney Disease <60  Kidney Failure <15    The GFR formula is only valid for adults with stable renal function between ages 18 and 70.    CBC & Differential [[id number]]  (Abnormal) Collected: 10/07/23 0948    Specimen: Blood Updated: 10/07/23 1249    Narrative:      The following orders were created for panel order CBC & Differential.  Procedure                               Abnormality         Status                     ---------                               -----------         ------                     CBC Auto Differential[[id number]]         Abnormal            Final result               Scan Slide[[id number]]                                       Final result                 Please view results for these tests on the individual orders.    Scan Slide [[id number]] Collected: 10/07/23 0948    Specimen: Blood Updated: 10/07/23 1249     Scan Slide --     Comment: See Manual Differential Results       Manual Differential [[id number]]  (Abnormal) Collected: 10/07/23 0948    Specimen: Blood Updated: 10/07/23 1249     Neutrophil % 13.0 %      Lymphocyte % 8.0 %      Monocyte % 4.0 %      Eosinophil % 4.0 %      Bands %  6.0 %      Metamyelocyte % 4.0 %      Myelocyte % 11.0 %      Promyelocyte % 1.0 %      Atypical Lymphocyte % 9.0 %      Blasts % 40.0 %      Neutrophils Absolute 9.04 10*3/mm3      Lymphocytes Absolute 8.09 10*3/mm3      Monocytes Absolute 1.90 10*3/mm3      Eosinophils Absolute 1.90 10*3/mm3      nRBC 2.0 /100 WBC      Anisocytosis Mod/2+     Dacrocytes Slight/1+     Macrocytes Slight/1+     Ovalocytes Slight/1+     Poikilocytes Slight/1+     Polychromasia Slight/1+     Vacuolated Neutrophils Slight/1+     Large Platelets Slight/1+    Narrative:      Path review conducted 10-3-23    CBC Auto Differential [[id number]]  (Abnormal) Collected: 10/07/23 0948    Specimen: Blood Updated: 10/07/23 1249     WBC 47.60 10*3/mm3      RBC 1.98 10*6/mm3      Hemoglobin 6.6 g/dL      Hematocrit 21.2 %      .9 fL      MCH 33.4 pg      MCHC 31.3 g/dL      RDW 21.4 %      RDW-SD 80.5 fl      MPV 10.0 fL      Platelets 57 10*3/mm3     Narrative:      The previously reported component NRBC is no longer being reported. Previous result was 0.2 /100 WBC (Reference Range: 0.0-0.2 /100 WBC) on 10/7/2023 at 1033 EDT.    Lactate Dehydrogenase [[id number]]  (Abnormal) Collected: 10/07/23 0947    Specimen: Blood Updated: 10/07/23 1202      U/L      Comment: Specimen hemolyzed.  Results may be affected.               PENDING RESULTS:     IMAGING REVIEWED:  IR  Bone marrow biopsy and aspiration    Result Date: 10/6/2023  Impression: Successful bone marrow aspiration and biopsy under fluoroscopy Electronically Signed: Deepak Pineda MD  10/6/2023 1:33 PM EDT  Workstation ID: [id number]     Assessment & Plan   ASSESSMENT:    Leukocytosis with leukoerythroblastic changes up to 22% blast forms worrisome for myelo infiltrative process, AML.  Flow cytometry still pending.  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  So far coagulation panel is normal.  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  Possible tumor lysis syndrome.  Creatinine has slightly improved  Hyperuricemia: Allopurinol 200 mg daily.  Uric acid level is beginning to decrease     PLANS:    Continue hydroxyurea 1 g p.o. twice a day for now.  White cells are responding.  Continue the same.  Still has high blast count up to 40%  Continue allopurinol  200 mg p.o. daily, uric acid is decreasing  We will send her peripheral blood for flow cytometry suspect that she has acute leukemia.  This is still pending  Bone marrow aspiration and biopsy was completed today 10/6/2023  Checked fibrinogen level, blood cultures urine cultures  No evidence of DIC  Check uric acid level, LDH, reticulocyte count  Daily check tumor lysis labs as well  Will continue to follow          Electronically signed by Lynn Gutierrez MD, 10/08/23, 1:48 PM EDT.

## 2023-10-08 NOTE — PROGRESS NOTES
[facility] Medicine Services   Daily Progress Note    Patient Name: Tricia Marroquin  : 1935  MRN: [id number]  Primary Care Physician:  Danish Herbert MD  Date of admission: 10/3/2023  Date and Time of Service: 10/7/23    Brief clinical summary:  88-year-old female with history of HTN, HLD, PAD, CAD with stenting on Plavix and aspirin presented to the hospital with complaints of palpitations and SOB. Cardio saw pt, questions afib but thinks abnormal rhythm is more consistent with sinus tach with intermittent second degree block (Wenckebach). She was also noted to have a dark stool, however, has been taking oral iron supplementation over the last month. So GI is not planning any acute interventions. Hemonc consulted for abnormal cytopenias (leucocytosis with circulating blast, anemia and thrombocytopenia). She completed bone marrow biopsy on 10/6, result pending. Flow cytometry result is also pending.     Subjective      Received 1 unit PRBC yesterday due to hemoglobin socorro of 6.6.  Posttransfusion hemoglobin improved to 8.3.  Patient resting in bed.  She is comfortable and cheerful.  Reports no new concerns    Objective      Vitals:   Temp:  [98.1 øF (36.7 øC)-99.2 øF (37.3 øC)] 99.2 øF (37.3 øC)  Heart Rate:  [68-86] 68  Resp:  [15-20] 20  BP: (118-160)/(37-67) 120/41    Physical Exam   General: normal interaction, not in distress  Mental status: Alert and oriented x3  Head: Normocephalic and atraumatic  Eyes: EOMI, nonicteric  CVS: regular heart sounds, not tachycardic, no gallops  Respiration: Nonlabored breathing, clear to auscultation  GI: Soft, nondistended, nontender, bowel sounds present  Extremity: No leg edema, no gross deformities  Neurology: Moves all extremities, no facial droop, normal speech  Psychiatry: Normal interaction, normal affect     Result Review    Result Review:  I have personally reviewed the results from the time of this admission to 10/8/2023 09:56 EDT and  agree with these findings:  [x]  Laboratory  []  Microbiology  []  Radiology  [x]  EKG/Telemetry   []  Cardiology/Vascular   []  Pathology  []  Old records  []  Other:    Assessment & Plan      allopurinol, 200 mg, Oral, Daily  atorvastatin, 20 mg, Oral, Daily  carvedilol, 12.5 mg, Oral, BID With Meals  cetirizine, 10 mg, Oral, Daily  ferrous sulfate, 324 mg, Oral, Daily With Breakfast  hydroxyurea, 1,000 mg, Oral, BID  melatonin, 5 mg, Oral, Nightly  pantoprazole, 40 mg, Oral, Daily  pregabalin, 75 mg, Oral, Daily  senna-docusate sodium, 2 tablet, Oral, BID  sodium chloride, 10 mL, Intravenous, Q12H               Active Hospital Problems:  Active Hospital Problems    Diagnosis     **Dyspnea     Dizziness     History of MI (myocardial infarction)     Leukocytosis     Symptomatic anemia     Dyslipidemia     Thrombocytopenia     Essential hypertension     Coronary artery disease involving native coronary artery of native heart with unstable angina pectoris     Shortness of breath      Plan:   #Supraventricular tachycardia, suspected A-fib with RVR versus sinus tach with PACs and Wenckebach  -Presently rate controlled off Cardizem drip  -Continue Coreg  -Cardiology following    #Leukocytosis with circulating blasts suspicious of myeloproliferative disease  #Anemia with Hgb drop to 6.6  #Thrombocytopenia  -Hematology following  -Transfused 1 u prbc on 10/7  -Posttransfusion hemoglobin improved above 8    #Acute kidney injury, prerenal due to dehydration  -Serum creatinine 1.98-->1.5-->1.69--->1.29 (previously <1 in 2020)  -Renal ultrasound showed medical renal disease, no obstructive uropathy  -Home Atacand and Aldactone on hold  -Monitor renal function  -Nephrology following    #Left renal cyst, 1.9 cm, incidental finding on ultrasound    #CAD  -Continue atorvastatin, Coreg, Imdur  -Aspirin and Plavix on hold due to risk of bleeding in the context of thrombocytopenia    #Insomnia brought on by peripheral  neuropathy  -Improved since starting melatonin and Lyrica, continue both     DVT prophylaxis:  Mechanical DVT prophylaxis orders are present.    CODE STATUS:    Medical Intervention Limits: NO intubation (DNI)  Level Of Support Discussed With: Patient  Code Status (Patient has no pulse and is not breathing): No CPR (Do Not Attempt to Resuscitate)  Medical Interventions (Patient has pulse or is breathing): Limited Support      Disposition:  I expect patient to be discharged 1-2 days      Electronically signed by Monet Bonner MD, 10/08/23, 09:56 EDT.  [facility] Hospitalist Team

## 2023-10-08 NOTE — PLAN OF CARE
Goal Outcome Evaluation:  Plan of Care Reviewed With: patient     Problem: Adult Inpatient Plan of Care  Goal: Plan of Care Review  Outcome: Ongoing, Progressing  Flowsheets (Taken 10/8/2023 0613)  Progress: improving  Plan of Care Reviewed With: patient        Progress: improving

## 2023-10-08 NOTE — PLAN OF CARE
Goal Outcome Evaluation:   Pt is alert and let her needs be known to staff, pt did not complain of any pain or  discomfort on this shift will continue to monitor.

## 2023-10-08 NOTE — THERAPY TREATMENT NOTE
Subjective: Pt agreeable to therapeutic plan of care.Pt stated she didn't think she needed  PT but needs help with her laundry and lost her paid CG that did that.    Objective:     Bed mobility - Independent  Transfers - Modified-Independent and with rolling walker  Ambulation - 700 feet Supervision and with rolling walker    Vitals: WNL    Pain: 0 VAS       Education: Provided education on the importance of mobility in the acute care setting, Verbal/Tactile Cues, Transfer Training, and Gait Training    Assessment: Tricia Marroquin presents with functional mobility impairments which indicate the need for skilled intervention. Tolerating session today without incident. Patient doing very well with all mobility. 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 Had her bone marrow biopsy and awaiting results. Has rollator at home. Plans on dc home with some family assist.Will continue to follow and progress as tolerated.     Plan/Recommendations:   No ongoing therapy recommended post-acute care. No therapy needs.. Pt requires no DME at discharge.     Pt desires Home with family assist at discharge. Pt cooperative; agreeable to therapeutic recommendations and plan of care.         Basic Mobility 6-click:  Rollin = Total, A lot = 2, A little = 3; 4 = None  Supine>Sit:   1 = Total, A lot = 2, A little = 3; 4 = None   Sit>Stand with arms:  1 = Total, A lot = 2, A little = 3; 4 = None  Bed>Chair:   1 = Total, A lot = 2, A little = 3; 4 = None  Ambulate in room:  1 = Total, A lot = 2, A little = 3; 4 = None  3-5 Steps with railin = Total, A lot = 2, A little = 3; 4 = None  Score: 22    Post-Tx Position: Up in Chair, Alarms activated, and Call light and personal items within reach  PPE: gloves

## 2023-10-08 NOTE — PROGRESS NOTES
"                                                                                                                                      Nephrology  Progress Note                                        [facility]    Patient Identification    Name: Tricia Marroquin  Age: 88 y.o.  Sex: female  :  1935  MRN: [id number]      DATE OF SERVICE:  10/8/2023        Subective    Feeling better        Objective   Scheduled Meds:allopurinol, 200 mg, Oral, Daily  atorvastatin, 20 mg, Oral, Daily  carvedilol, 12.5 mg, Oral, BID With Meals  cetirizine, 10 mg, Oral, Daily  ferrous sulfate, 324 mg, Oral, Daily With Breakfast  hydroxyurea, 1,000 mg, Oral, BID  melatonin, 5 mg, Oral, Nightly  pantoprazole, 40 mg, Oral, Daily  pregabalin, 75 mg, Oral, Daily  senna-docusate sodium, 2 tablet, Oral, BID  sodium chloride, 10 mL, Intravenous, Q12H          Continuous Infusions:     PRN Meds:  acetaminophen    senna-docusate sodium **AND** polyethylene glycol **AND** bisacodyl **AND** bisacodyl    nitroglycerin    [COMPLETED] Insert Peripheral IV **AND** sodium chloride    sodium chloride    sodium chloride     Exam:  /41 (BP Location: Left arm, Patient Position: Lying)   Pulse 68   Temp 99.2 øF (37.3 øC) (Oral)   Resp 20   Ht 154.9 cm (61\")   Wt 80.5 kg (177 lb 7.5 oz)   SpO2 92%   BMI 33.53 kg/mý     Intake/Output last 3 shifts:  I/O last 3 completed shifts:  In: 1140 [P.O.:840; Blood:300]  Out: -     Intake/Output this shift:  No intake/output data recorded.    Physical exam:  General Appearance:  Alert  Head:  Normocephalic, without obvious abnormality, atraumatic  Eyes:  PERRL, conjunctiva/corneas clear     Neck:  Supple,  no adenopathy;      Lungs:  Decreased BS occasion ronchi  Heart:  Regular rate and rhythm, S1 and S2 normal  Abdomen:  Soft, non-tender, bowel sounds active   Extremities: trace edema  Pulses: 2+ and symmetric all extremities  Skin:  No rashes or lesions       Data Review:  All labs " (24hrs):   Recent Results (from the past 24 hour(s))   Comprehensive Metabolic Panel    Collection Time: 10/07/23  9:47 AM    Specimen: Blood   Result Value Ref Range    Glucose 110 (H) 65 - 99 mg/dL    BUN 32 (H) 8 - 23 mg/dL    Creatinine 1.27 (H) 0.57 - 1.00 mg/dL    Sodium 138 136 - 145 mmol/L    Potassium 4.2 3.5 - 5.2 mmol/L    Chloride 104 98 - 107 mmol/L    CO2 24.0 22.0 - 29.0 mmol/L    Calcium 8.6 8.6 - 10.5 mg/dL    Total Protein 6.2 6.0 - 8.5 g/dL    Albumin 3.8 3.5 - 5.2 g/dL    ALT (SGPT) 18 1 - 33 U/L    AST (SGOT) 28 1 - 32 U/L    Alkaline Phosphatase 65 39 - 117 U/L    Total Bilirubin 0.3 0.0 - 1.2 mg/dL    Globulin 2.4 gm/dL    A/G Ratio 1.6 g/dL    BUN/Creatinine Ratio 25.2 (H) 7.0 - 25.0    Anion Gap 10.0 5.0 - 15.0 mmol/L    eGFR 41.3 (L) >60.0 mL/min/1.73   Uric Acid    Collection Time: 10/07/23  9:47 AM    Specimen: Blood   Result Value Ref Range    Uric Acid 9.5 (H) 2.4 - 5.7 mg/dL   Lactate Dehydrogenase    Collection Time: 10/07/23  9:47 AM    Specimen: Blood   Result Value Ref Range     (H) 135 - 214 U/L   Phosphorus    Collection Time: 10/07/23  9:47 AM    Specimen: Blood   Result Value Ref Range    Phosphorus 3.8 2.5 - 4.5 mg/dL   Magnesium    Collection Time: 10/07/23  9:47 AM    Specimen: Blood   Result Value Ref Range    Magnesium 2.0 1.6 - 2.4 mg/dL   High Sensitivity Troponin T    Collection Time: 10/07/23  9:47 AM    Specimen: Blood   Result Value Ref Range    HS Troponin T 16 (H) <10 ng/L   CBC Auto Differential    Collection Time: 10/07/23  9:48 AM    Specimen: Blood   Result Value Ref Range    WBC 47.60 (C) 3.40 - 10.80 10*3/mm3    RBC 1.98 (L) 3.77 - 5.28 10*6/mm3    Hemoglobin 6.6 (C) 12.0 - 15.9 g/dL    Hematocrit 21.2 (L) 34.0 - 46.6 %    .9 (H) 79.0 - 97.0 fL    MCH 33.4 (H) 26.6 - 33.0 pg    MCHC 31.3 (L) 31.5 - 35.7 g/dL    RDW 21.4 (H) 12.3 - 15.4 %    RDW-SD 80.5 (H) 37.0 - 54.0 fl    MPV 10.0 6.0 - 12.0 fL    Platelets 57 (L) 140 - 450 10*3/mm3   Scan  Slide    Collection Time: 10/07/23  9:48 AM    Specimen: Blood   Result Value Ref Range    Scan Slide     Manual Differential    Collection Time: 10/07/23  9:48 AM    Specimen: Blood   Result Value Ref Range    Neutrophil % 13.0 (L) 42.7 - 76.0 %    Lymphocyte % 8.0 (L) 19.6 - 45.3 %    Monocyte % 4.0 (L) 5.0 - 12.0 %    Eosinophil % 4.0 0.3 - 6.2 %    Bands %  6.0 (H) 0.0 - 5.0 %    Metamyelocyte % 4.0 (H) 0.0 - 0.0 %    Myelocyte % 11.0 (H) 0.0 - 0.0 %    Promyelocyte % 1.0 (H) 0.0 - 0.0 %    Atypical Lymphocyte % 9.0 (H) 0.0 - 5.0 %    Blasts % 40.0 (H) 0.0 - 0.0 %    Neutrophils Absolute 9.04 (H) 1.70 - 7.00 10*3/mm3    Lymphocytes Absolute 8.09 (H) 0.70 - 3.10 10*3/mm3    Monocytes Absolute 1.90 (H) 0.10 - 0.90 10*3/mm3    Eosinophils Absolute 1.90 (H) 0.00 - 0.40 10*3/mm3    nRBC 2.0 (H) 0.0 - 0.2 /100 WBC    Anisocytosis Mod/2+ None Seen    Dacrocytes Slight/1+ None Seen    Macrocytes Slight/1+ None Seen    Ovalocytes Slight/1+ None Seen    Poikilocytes Slight/1+ None Seen    Polychromasia Slight/1+ None Seen    Vacuolated Neutrophils Slight/1+ None Seen    Large Platelets Slight/1+ None Seen   Basic Metabolic Panel    Collection Time: 10/07/23  3:12 PM    Specimen: Blood   Result Value Ref Range    Glucose 115 (H) 65 - 99 mg/dL    BUN 34 (H) 8 - 23 mg/dL    Creatinine 1.27 (H) 0.57 - 1.00 mg/dL    Sodium 137 136 - 145 mmol/L    Potassium 4.0 3.5 - 5.2 mmol/L    Chloride 103 98 - 107 mmol/L    CO2 24.0 22.0 - 29.0 mmol/L    Calcium 8.5 (L) 8.6 - 10.5 mg/dL    BUN/Creatinine Ratio 26.8 (H) 7.0 - 25.0    Anion Gap 10.0 5.0 - 15.0 mmol/L    eGFR 41.3 (L) >60.0 mL/min/1.73   Type & Screen    Collection Time: 10/07/23  3:12 PM    Specimen: Blood   Result Value Ref Range    ABO Type A     RH type Positive     Antibody Screen Negative     T&S Expiration Date 10/10/2023 11:59:59 PM    Prepare RBC, 1 Units    Collection Time: 10/07/23  5:03 PM   Result Value Ref Range    Product Code A7893J77     Unit Number  [id number]     UNIT  ABO A     UNIT  RH POS     Crossmatch Interpretation Compatible     Dispense Status IS     Blood Expiration Date [id number]     Blood Type Barcode 6200    Uric Acid    Collection Time: 10/08/23  3:23 AM    Specimen: Blood   Result Value Ref Range    Uric Acid 8.5 (H) 2.4 - 5.7 mg/dL   Lactate Dehydrogenase    Collection Time: 10/08/23  3:23 AM    Specimen: Blood   Result Value Ref Range     (H) 135 - 214 U/L   Phosphorus    Collection Time: 10/08/23  3:23 AM    Specimen: Blood   Result Value Ref Range    Phosphorus 4.1 2.5 - 4.5 mg/dL   Magnesium    Collection Time: 10/08/23  3:23 AM    Specimen: Blood   Result Value Ref Range    Magnesium 2.1 1.6 - 2.4 mg/dL   CBC Auto Differential    Collection Time: 10/08/23  3:23 AM    Specimen: Blood   Result Value Ref Range    WBC 39.70 (C) 3.40 - 10.80 10*3/mm3    RBC 2.51 (L) 3.77 - 5.28 10*6/mm3    Hemoglobin 8.3 (L) 12.0 - 15.9 g/dL    Hematocrit 25.2 (L) 34.0 - 46.6 %    .3 (H) 79.0 - 97.0 fL    MCH 32.9 26.6 - 33.0 pg    MCHC 32.8 31.5 - 35.7 g/dL    RDW 23.0 (H) 12.3 - 15.4 %    RDW-SD 77.9 (H) 37.0 - 54.0 fl    MPV 9.6 6.0 - 12.0 fL    Platelets 49 (C) 140 - 450 10*3/mm3   Scan Slide    Collection Time: 10/08/23  3:23 AM    Specimen: Blood   Result Value Ref Range    Scan Slide     Manual Differential    Collection Time: 10/08/23  3:23 AM    Specimen: Blood   Result Value Ref Range    Neutrophil % 13.0 (L) 42.7 - 76.0 %    Lymphocyte % 13.0 (L) 19.6 - 45.3 %    Monocyte % 12.0 5.0 - 12.0 %    Bands %  3.0 0.0 - 5.0 %    Metamyelocyte % 2.0 (H) 0.0 - 0.0 %    Promyelocyte % 3.0 (H) 0.0 - 0.0 %    Blasts % 54.0 (H) 0.0 - 0.0 %    Neutrophils Absolute 6.35 1.70 - 7.00 10*3/mm3    Lymphocytes Absolute 5.16 (H) 0.70 - 3.10 10*3/mm3    Monocytes Absolute 4.76 (H) 0.10 - 0.90 10*3/mm3    nRBC 2.0 (H) 0.0 - 0.2 /100 WBC    Anisocytosis Slight/1+ None Seen    WBC Morphology Normal Normal    Platelet Estimate Decreased Normal    Large  Platelets Slight/1+ None Seen          Imaging:  IR Bone marrow biopsy and aspiration    Result Date: 10/6/2023  Impression: Successful bone marrow aspiration and biopsy under fluoroscopy Electronically Signed: Deepak Pineda MD  10/6/2023 1:33 PM EDT  Workstation ID: [id number]    US Renal Bilateral    Result Date: 10/4/2023  Impression: 1. Increased echogenicity in both kidneys suggesting medical renal disease. 2. No evidence of obstructive uropathy. 3. 1.9 cm left renal cyst. Electronically Signed: Deepak Pineda MD  10/4/2023 11:31 AM EDT  Workstation ID: [id number]    XR Chest 1 View    Result Date: 10/3/2023  Impression: No active disease Electronically Signed: Francis Shah MD  10/3/2023 5:42 AM EDT  Workstation ID: [id number]     Assessment/Plan:     Dyspnea    Shortness of breath    Coronary artery disease involving native coronary artery of native heart with unstable angina pectoris    Essential hypertension    Dyslipidemia    Thrombocytopenia    Dizziness    History of MI (myocardial infarction)    Leukocytosis    Symptomatic anemia       Acute kidney injury likely secondary to prerenal component  Check arrhythmias  Hypertension  Coronary artery disease  Leukocytosis  Possible GI bleed  Left renal cyst     Recommendations:  Noted Hb  Check BMP BMP pending today  Noted renal ultrasound  Gentle hydration  Control heart rate  Correct electrolyte

## 2023-10-09 ENCOUNTER — TELEPHONE (OUTPATIENT)
Dept: ONCOLOGY | Facility: CLINIC | Age: 88
End: 2023-10-09
Payer: MEDICARE

## 2023-10-09 ENCOUNTER — SPECIALTY PHARMACY (OUTPATIENT)
Dept: PHARMACY | Facility: HOSPITAL | Age: 88
End: 2023-10-09
Payer: MEDICARE

## 2023-10-09 ENCOUNTER — DOCUMENTATION (OUTPATIENT)
Dept: HOME HEALTH SERVICES | Facility: HOME HEALTHCARE | Age: 88
End: 2023-10-09
Payer: COMMERCIAL

## 2023-10-09 ENCOUNTER — DOCUMENTATION (OUTPATIENT)
Dept: PHARMACY | Facility: HOSPITAL | Age: 88
End: 2023-10-09
Payer: MEDICARE

## 2023-10-09 ENCOUNTER — TRANSCRIBE ORDERS (OUTPATIENT)
Dept: HOME HEALTH SERVICES | Facility: HOME HEALTHCARE | Age: 88
End: 2023-10-09
Payer: COMMERCIAL

## 2023-10-09 ENCOUNTER — READMISSION MANAGEMENT (OUTPATIENT)
Dept: CALL CENTER | Facility: HOSPITAL | Age: 88
End: 2023-10-09
Payer: MEDICARE

## 2023-10-09 ENCOUNTER — TELEPHONE (OUTPATIENT)
Dept: CARDIOLOGY | Facility: CLINIC | Age: 88
End: 2023-10-09
Payer: MEDICARE

## 2023-10-09 ENCOUNTER — HOME HEALTH ADMISSION (OUTPATIENT)
Dept: HOME HEALTH SERVICES | Facility: HOME HEALTHCARE | Age: 88
End: 2023-10-09
Payer: COMMERCIAL

## 2023-10-09 VITALS
OXYGEN SATURATION: 94 % | TEMPERATURE: 97.9 F | WEIGHT: 177.47 LBS | RESPIRATION RATE: 20 BRPM | HEIGHT: 61 IN | HEART RATE: 57 BPM | DIASTOLIC BLOOD PRESSURE: 41 MMHG | SYSTOLIC BLOOD PRESSURE: 117 MMHG | BODY MASS INDEX: 33.51 KG/M2

## 2023-10-09 DIAGNOSIS — I48.91 ATRIAL FIBRILLATION, UNSPECIFIED TYPE: Primary | ICD-10-CM

## 2023-10-09 PROBLEM — C92.00 ACUTE MYELOID LEUKEMIA: Status: ACTIVE | Noted: 2023-10-09

## 2023-10-09 LAB
ANISOCYTOSIS BLD QL: ABNORMAL
BLASTS NFR BLD MANUAL: 52 % (ref 0–0)
DEPRECATED RDW RBC AUTO: 81.4 FL (ref 37–54)
ERYTHROCYTE [DISTWIDTH] IN BLOOD BY AUTOMATED COUNT: 22.7 % (ref 12.3–15.4)
HCT VFR BLD AUTO: 23.8 % (ref 34–46.6)
HGB BLD-MCNC: 7.8 G/DL (ref 12–15.9)
LDH SERPL-CCNC: 804 U/L (ref 135–214)
LYMPHOCYTES # BLD MANUAL: 4.69 10*3/MM3 (ref 0.7–3.1)
LYMPHOCYTES NFR BLD MANUAL: 10 % (ref 5–12)
MAGNESIUM SERPL-MCNC: 2 MG/DL (ref 1.6–2.4)
MCH RBC QN AUTO: 31.9 PG (ref 26.6–33)
MCHC RBC AUTO-ENTMCNC: 32.6 G/DL (ref 31.5–35.7)
MCV RBC AUTO: 98.1 FL (ref 79–97)
METAMYELOCYTES NFR BLD MANUAL: 2 % (ref 0–0)
MONOCYTES # BLD: 2.47 10*3/MM3 (ref 0.1–0.9)
NEUTROPHILS # BLD AUTO: 4.2 10*3/MM3 (ref 1.7–7)
NEUTROPHILS NFR BLD MANUAL: 14 % (ref 42.7–76)
NEUTS BAND NFR BLD MANUAL: 3 % (ref 0–5)
PHOSPHATE SERPL-MCNC: 3.4 MG/DL (ref 2.5–4.5)
PLATELET # BLD AUTO: 43 10*3/MM3 (ref 140–450)
PMV BLD AUTO: 10.1 FL (ref 6–12)
POIKILOCYTOSIS BLD QL SMEAR: ABNORMAL
QT INTERVAL: 390 MS
QTC INTERVAL: 424 MS
RBC # BLD AUTO: 2.43 10*6/MM3 (ref 3.77–5.28)
SCAN SLIDE: NORMAL
SMALL PLATELETS BLD QL SMEAR: ABNORMAL
URATE SERPL-MCNC: 7.8 MG/DL (ref 2.4–5.7)
VARIANT LYMPHS NFR BLD MANUAL: 19 % (ref 19.6–45.3)
WBC MORPH BLD: NORMAL
WBC NRBC COR # BLD: 24.7 10*3/MM3 (ref 3.4–10.8)

## 2023-10-09 PROCEDURE — 84550 ASSAY OF BLOOD/URIC ACID: CPT | Performed by: INTERNAL MEDICINE

## 2023-10-09 PROCEDURE — 99232 SBSQ HOSP IP/OBS MODERATE 35: CPT | Performed by: INTERNAL MEDICINE

## 2023-10-09 PROCEDURE — 84100 ASSAY OF PHOSPHORUS: CPT | Performed by: INTERNAL MEDICINE

## 2023-10-09 PROCEDURE — 85007 BL SMEAR W/DIFF WBC COUNT: CPT | Performed by: INTERNAL MEDICINE

## 2023-10-09 PROCEDURE — 83735 ASSAY OF MAGNESIUM: CPT | Performed by: INTERNAL MEDICINE

## 2023-10-09 PROCEDURE — 83615 LACTATE (LD) (LDH) ENZYME: CPT | Performed by: INTERNAL MEDICINE

## 2023-10-09 PROCEDURE — 85025 COMPLETE CBC W/AUTO DIFF WBC: CPT | Performed by: INTERNAL MEDICINE

## 2023-10-09 RX ORDER — ALLOPURINOL 200 MG/1
200 TABLET ORAL DAILY
Qty: 7 TABLET | Refills: 0 | Status: SHIPPED | OUTPATIENT
Start: 2023-10-10 | End: 2023-10-17

## 2023-10-09 RX ORDER — HYDROXYUREA 500 MG/1
1000 CAPSULE ORAL 2 TIMES DAILY
Qty: 28 CAPSULE | Refills: 0 | Status: SHIPPED | OUTPATIENT
Start: 2023-10-09 | End: 2023-10-16

## 2023-10-09 RX ORDER — CHOLECALCIFEROL (VITAMIN D3) 125 MCG
5 CAPSULE ORAL NIGHTLY
Qty: 30 TABLET | Refills: 0 | Status: SHIPPED | OUTPATIENT
Start: 2023-10-09 | End: 2023-11-08

## 2023-10-09 RX ORDER — PREGABALIN 75 MG/1
75 CAPSULE ORAL DAILY
Qty: 30 CAPSULE | Refills: 0 | Status: SHIPPED | OUTPATIENT
Start: 2023-10-10 | End: 2023-11-09

## 2023-10-09 RX ADMIN — CETIRIZINE HYDROCHLORIDE 10 MG: 10 TABLET, FILM COATED ORAL at 08:32

## 2023-10-09 RX ADMIN — PANTOPRAZOLE SODIUM 40 MG: 40 TABLET, DELAYED RELEASE ORAL at 08:32

## 2023-10-09 RX ADMIN — ACETAMINOPHEN 650 MG: 325 TABLET, FILM COATED ORAL at 01:48

## 2023-10-09 RX ADMIN — Medication 10 ML: at 08:36

## 2023-10-09 RX ADMIN — HYDROXYUREA 1000 MG: 500 CAPSULE ORAL at 08:32

## 2023-10-09 RX ADMIN — PREGABALIN 75 MG: 75 CAPSULE ORAL at 08:31

## 2023-10-09 RX ADMIN — FERROUS SULFATE TAB EC 324 MG (65 MG FE EQUIVALENT) 324 MG: 324 (65 FE) TABLET DELAYED RESPONSE at 08:31

## 2023-10-09 RX ADMIN — ATORVASTATIN CALCIUM 20 MG: 20 TABLET, FILM COATED ORAL at 08:31

## 2023-10-09 RX ADMIN — ALLOPURINOL 200 MG: 100 TABLET ORAL at 08:31

## 2023-10-09 RX ADMIN — CARVEDILOL 12.5 MG: 6.25 TABLET, FILM COATED ORAL at 08:31

## 2023-10-09 NOTE — DISCHARGE PLACEMENT REQUEST
"Edita Marroquin (88 y.o. Female)       Date of Birth   1935    Social Security Number       Address   [address] [place] [facility]    Home Phone   [phone]    MRN   [id number]       Christian   Yazidi    Marital Status                               Admission Date   10/3/23    Admission Type   Emergency    Admitting Provider   Amy Villagomez MD    Attending Provider       Department, Room/Bed   [facility] 2D, 257/1       Discharge Date   10/9/2023    Discharge Disposition   Home or Self Care    Discharge Destination                                 Attending Provider: (none)   Allergies: Aspirin, Codeine, Penicillins, Lisinopril    Isolation: None   Infection: None   Code Status: No CPR    Ht: 154.9 cm (61\")   Wt: 80.5 kg (177 lb 7.5 oz)    Admission Cmt: None   Principal Problem: Dyspnea [R06.00]                   Active Insurance as of 10/3/2023       Primary Coverage       Payor Plan Insurance Group Employer/Plan Group    ANTHEM MEDICARE REPLACEMENT ANTHEM MEDICARE ADVANTAGE [id number]       Payor Plan Address Payor Plan Phone Number Payor Plan Fax Number Effective Dates    [address] [phone]  1/1/2023 - None Entered    [facility] [phone]         Subscriber Name Subscriber Birth Date Member ID       EDITA MARROQUIN 1935 [id number]               Secondary Coverage       Payor Plan Insurance Group Employer/Plan Group    [place]        Payor Plan Address Payor Plan Phone Number Payor Plan Fax Number Effective Dates    [address]   4/12/2022 - None Entered    [place] [address]         Subscriber Name Subscriber Birth Date Member ID       EDITA MARROQUIN 1935 [id number]                     Emergency Contacts        (Rel.) Home Phone Work Phone Mobile Phone    MEGHA MARROQUIN (Son) [phone] -- [phone]                "

## 2023-10-09 NOTE — PROGRESS NOTES
Hematology/Oncology Inpatient Progress Note    PATIENT NAME: Tricia Marroquin  : 1935  MRN: [id number]    CHIEF COMPLAINT: Shortness of breath      HISTORY OF PRESENT ILLNESS:      Tricia Marroquin is a 88 y.o. female who presented to [facility] on 10/3/2023 with complaints of difficulty breathing and irregular heartbeats.  Patient had denied chest pain but has had worsening orthopnea.  She had some back stools as well prior to presentation to the hospital.  In the emergency room she was found to have A-fib elevated proBNP, significant leukocytosis and thrombocytopenia.  She also was found to have acute kidney injury with a creatinine of 1.6.  Review of her CBC showed a white count of 68,000, hemoglobin 7.8, MCV was 106 and platelets of 58,000 with evidence of leukoerythroblastic changes, up to 22% blast forms, promyelocytes myelocytes.  PT was normal at 11, INR is 1 PTT was 26.7 and magnesium was normal at 2.4.  She has a normal calcium and iron studies were unremarkable.     Her peripheral smear also concurs with the above blast forms     10/03/23  Hematology/Oncology was consulted number cytopenia.  Patient was seen by Dr. Paez back in .  Her thrombocytopenia was dating back to  with platelet counts ranging between 57 and 10 4000 mild anemia and leukopenia.  Patient was recommended to follow-up in the outpatient setting for possible bone marrow biopsy.     He/She  has a past medical history of Hypertension and Seasonal allergies.     PCP: Danish Herbert MD      Subjective     Patient denies any new issues today.        ROS:  Review of Systems   Constitutional:  Positive for fatigue.   Neurological:  Positive for weakness.        MEDICATIONS:    Scheduled Meds:  allopurinol, 200 mg, Oral, Daily  atorvastatin, 20 mg, Oral, Daily  carvedilol, 12.5 mg, Oral, BID With Meals  cetirizine, 10 mg, Oral, Daily  ferrous sulfate, 324 mg, Oral, Daily With Breakfast  hydroxyurea, 1,000  "mg, Oral, BID  melatonin, 5 mg, Oral, Nightly  pantoprazole, 40 mg, Oral, Daily  pregabalin, 75 mg, Oral, Daily  senna-docusate sodium, 2 tablet, Oral, BID  sodium chloride, 10 mL, Intravenous, Q12H       Continuous Infusions:        PRN Meds:    acetaminophen    senna-docusate sodium **AND** polyethylene glycol **AND** bisacodyl **AND** bisacodyl    nitroglycerin    [COMPLETED] Insert Peripheral IV **AND** sodium chloride    sodium chloride    sodium chloride     ALLERGIES:    Allergies   Allergen Reactions    Aspirin Other (See Comments)     Pt reports \"makes my heart go crazy\"    Codeine GI Intolerance    Penicillins Hives    Lisinopril Cough       Objective    VITALS:   /41 (BP Location: Left arm, Patient Position: Lying)   Pulse 57   Temp 97.9 øF (36.6 øC) (Oral)   Resp 20   Ht 154.9 cm (61\")   Wt 80.5 kg (177 lb 7.5 oz)   SpO2 94%   BMI 33.53 kg/mý     PHYSICAL EXAM: (performed by MD)  Physical Exam  Constitutional:       Appearance: She is ill-appearing.   HENT:      Right Ear: External ear normal.      Left Ear: External ear normal.   Pulmonary:      Effort: Pulmonary effort is normal.   Neurological:      General: No focal deficit present.      Mental Status: She is oriented to person, place, and time. Mental status is at baseline.   Psychiatric:         Mood and Affect: Mood normal.         Behavior: Behavior normal.         Thought Content: Thought content normal.         Judgment: Judgment normal.           I have reexamined the patient and the results are consistent with the previously documented exam. 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 Immature cells/blasts account for   analyzed cells. See comment.  DISCLAIMER: REFER TO HARDCOPY OR PDF FOR COMPLETE RESULT.   If synopsis provided, clinical decisions should not be   based on this interfaced synopsis alone.  Performed at:  1 - [facility]  [address], [place] [address]  :  Laura Busch M.D., Ph.D., Phone:  [phone]       Narrative:      Performed at:  1 - [facility]  [address], [place] [address]  :  Laura Busch M.D., Ph.D., Phone:  [phone]    CBC & Differential [[id number]]  (Abnormal) Collected: 10/09/23 0359    Specimen: Blood Updated: 10/09/23 0528    Narrative:      The following orders were created for panel order CBC & Differential.  Procedure                               Abnormality         Status                     ---------                               -----------         ------                     CBC Auto Differential[[id number]]        Abnormal            Final result               Scan Slide[[id number]]                                       Final result                 Please view results for these tests on the individual orders.    CBC Auto Differential [[id number]]  (Abnormal) Collected: 10/09/23 0359    Specimen: Blood Updated: 10/09/23 0528     WBC 24.70 10*3/mm3      RBC 2.43 10*6/mm3      Hemoglobin 7.8 g/dL      Hematocrit 23.8 %      MCV 98.1 fL      MCH 31.9 pg      MCHC 32.6 g/dL      RDW 22.7 %      RDW-SD 81.4 fl      MPV 10.1 fL      Platelets 43 10*3/mm3     Narrative:      The previously reported component NRBC is no longer being reported. Previous result was 0.1 /100 WBC (Reference Range: 0.0-0.2 /100 WBC) on 10/9/2023 at 0455 EDT.    Scan Slide [[id number]] Collected: 10/09/23 0359    Specimen: Blood Updated: 10/09/23 0528     Scan Slide --     Comment: See Manual Differential Results       Manual Differential  [[id number]]  (Abnormal) Collected: 10/09/23 0359    Specimen: Blood Updated: 10/09/23 0528     Neutrophil % 14.0 %      Lymphocyte % 19.0 %      Monocyte % 10.0 %      Bands %  3.0 %      Metamyelocyte % 2.0 %      Blasts % 52.0 %      Neutrophils Absolute 4.20 10*3/mm3      Lymphocytes Absolute 4.69 10*3/mm3      Monocytes Absolute 2.47 10*3/mm3      Anisocytosis Slight/1+     Poikilocytes Slight/1+     WBC Morphology Normal     Platelet Estimate Decreased    Narrative:      Reviewed by Pathologist within the past 30 days on 10/3/23 .      Uric Acid [[id number]]  (Abnormal) Collected: 10/09/23 0359    Specimen: Blood Updated: 10/09/23 0512     Uric Acid 7.8 mg/dL     Lactate Dehydrogenase [[id number]]  (Abnormal) Collected: 10/09/23 0359    Specimen: Blood Updated: 10/09/23 0512      U/L     Phosphorus [[id number]]  (Normal) Collected: 10/09/23 0359    Specimen: Blood Updated: 10/09/23 0512     Phosphorus 3.4 mg/dL     Magnesium [[id number]]  (Normal) Collected: 10/09/23 0359    Specimen: Blood Updated: 10/09/23 0512     Magnesium 2.0 mg/dL     Blood Culture - Blood, Wrist, Left [[id number]]  (Normal) Collected: 10/03/23 2041    Specimen: Blood from Wrist, Left Updated: 10/08/23 2100     Blood Culture No growth at 5 days    Basic Metabolic Panel [[id number]]  (Abnormal) Collected: 10/08/23 1320    Specimen: Blood Updated: 10/08/23 1413     Glucose 89 mg/dL      BUN 32 mg/dL      Creatinine 1.10 mg/dL      Sodium 138 mmol/L      Potassium 4.1 mmol/L      Comment: Slight hemolysis detected by analyzer. Results may be affected.        Chloride 104 mmol/L      CO2 22.0 mmol/L      Calcium 8.7 mg/dL      BUN/Creatinine Ratio 29.1     Anion Gap 12.0 mmol/L      eGFR 49.0 mL/min/1.73     Narrative:      GFR Normal >60  Chronic Kidney Disease <60  Kidney Failure <15    The GFR formula is only valid for adults with stable renal function between ages 18 and 70.            PENDING RESULTS:     IMAGING REVIEWED:  No radiology  results for the last day    Assessment & Plan   ASSESSMENT:    Leukocytosis with leukoerythroblastic changes up to 22% blast forms worrisome for myelo infiltrative process, AML.  Flow cytometry still pending.  Status post bone marrow session and biopsy and pathology is also pending.  Expect final reports to be back soon.  Patient is aware of the possible diagnosis.  Recurrent anemia: Status post packed red blood cells 10/7/2023.  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  So far coagulation panel is normal.  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  Possible tumor lysis syndrome.  Creatinine has slightly improved  Hyperuricemia: Allopurinol 200 mg daily.  Uric acid level is beginning to decrease     PLANS:    Continue hydroxyurea 1 g p.o. twice a day for now.  White cells are responding.  Continue the same.  Still has high blast count up to 40%.  We will continue the same  Continue allopurinol  200 mg p.o. daily, uric acid is decreasing  Await the final results of her bone marrow biopsy  Checked fibrinogen level, blood cultures urine cultures  No evidence of DIC  Check uric acid level, LDH, reticulocyte count  Daily check tumor lysis labs as well  Discussed with patient  Will continue to follow            Electronically signed by Lynn Gutierrez MD, 10/12/23, 8:39 AM EDT.

## 2023-10-09 NOTE — PLAN OF CARE
Goal Outcome Evaluation:  Plan of Care Reviewed With: patient     Problem: Adult Inpatient Plan of Care  Goal: Plan of Care Review  Outcome: Ongoing, Progressing  Flowsheets (Taken 10/9/2023 2706)  Progress: improving  Plan of Care Reviewed With: patient        Progress: improving

## 2023-10-09 NOTE — TELEPHONE ENCOUNTER
DR ENCARNACION OFFICE CALLED AND WANTED TO LET DR MEDINA KNOW THAT EDITA WAS STARTING TREATMENT AML AND AND IT REACTS TO SOME OF HER CURRENT MEDICATIONS.  VENETOCLAX DR ENCARNACION WANTED TO KNOW IF THIS CAN BE CHANGED BECAUSE IT INCREASES SIDE EFFECTS OF HER CANCER MEDICATION.

## 2023-10-09 NOTE — DISCHARGE SUMMARY
[facility] Medicine Services  Discharge Summary    Date of Service: 10/9/23  Patient Name: Tricia Marroquin  : 1935  MRN: [id number]    Date of Admission: 10/3/2023  Discharge Diagnosis: SVT, AML (suspected)  Date of Discharge:  10/9/23  Primary Care Physician: Danish Herbert MD      Presenting Problem:   Dyspnea [R06.00]  Atrial fibrillation with RVR [I48.91]  Leukocytosis, unspecified type [D72.829]  Dyspnea, unspecified type [R06.00]    Active and Resolved Hospital Problems:  Active Hospital Problems    Diagnosis POA    **Dyspnea [R06.00] Yes    Dizziness [R42] Yes    History of MI (myocardial infarction) [I25.2] Not Applicable    Leukocytosis [D72.829] Yes    Symptomatic anemia [D64.9] Yes    Dyslipidemia [E78.5] Yes    Thrombocytopenia [D69.6] Yes    Essential hypertension [I10] Yes    Coronary artery disease involving native coronary artery of native heart with unstable angina pectoris [I25.110] Yes    Shortness of breath [R06.02] Yes      Resolved Hospital Problems   No resolved problems to display.         Hospital Course     88-year-old female with history of HTN, HLD, PAD, CAD with stenting on Plavix and aspirin presented to the hospital with complaints of palpitations and SOB. Cardio saw pt, questioned afib but thought abnormal rhythm was more consistent with sinus tach with intermittent second degree block (Wenckebach). She was also noted to have a dark stool, however, has been taking oral iron over the last month. So GI is not planning any acute interventions since dark stool was attributed to iron.. Hemonc consulted for abnormal cytopenias (leucocytosis with circulating blast, anemia and thrombocytopenia). She completed bone marrow biopsy on 10/6, result pending. Flow cytometry result is also pending. Dr Gutierrez will have a close follow up with her in the office to discuss outstanding results and Rx plan. Pt was seen in consultation with cardiology,  nephrology and hemonc    #Supraventricular tachycardia, suspected A-fib with RVR versus sinus tach with PACs and Wenckebach  -Continue Coreg     #Leukocytosis with circulating blasts (22%) suspicious of myeloproliferative disease  #Anemia and thrombocytopenia due to suspected bone marrow failure  -Transfused 1 u prbc on 10/7  -Posttransfusion hemoglobin improved above 8  -Dr Gutierrez started her on hydroxyurea  -F/u with Dr Gutierrez within 1 week     #Acute kidney injury, prerenal due to dehydration  -Serum creatinine 1.98-->1.5-->1.69--->1.29 -->1.1 (previously <1 in 2020)  -Renal ultrasound showed medical renal disease, no obstructive uropathy  -Home Atacand and Aldactone discontinued     #Hyperuricemia  -Dr Gutierrez started her on allopurinol      #Left renal cyst, 1.9 cm, incidental finding on ultrasound     #CAD  -Continue atorvastatin, Coreg, Imdur  -Aspirin and Plavix discontinued due to high risk of bleeding in the context of thrombocytopenia     #Insomnia brought on by peripheral neuropathy  -Improved since starting melatonin and Lyrica, continue both      DISCHARGE Follow Up Recommendations for labs and diagnostics:   F/u with Dr Gutierrez to discuss flow cytometry and bone marrow biopsy results    Reasons For Change In Medications and Indications for New Medications:  ASA/Plavix stopped (bleeding risk)  Atacand and Aldactone stopped (MISSAEL)  Hydroxyurea and allopurinol initiated by Dr Gutierrez    Day of Discharge     Vital Signs:  Temp:  [97.7 øF (36.5 øC)-99.9 øF (37.7 øC)] 97.9 øF (36.6 øC)  Heart Rate:  [57-82] 57  Resp:  [16-24] 20  BP: (117-145)/(41-59) 117/41    Physical Exam:  General: normal interaction, not in distress  Mental status: Alert and oriented x3  Head: Normocephalic and atraumatic  Eyes: EOMI, nonicteric  CVS: regular heart sounds, not tachycardic, no gallops  Respiration: Nonlabored breathing, clear to auscultation  GI: Soft, nondistended, nontender, bowel sounds present  Extremity: No leg edema, no  gross deformities  Neurology: Moves all extremities, no facial droop, normal speech  Psychiatry: Normal interaction, normal affect       Pertinent  and/or Most Recent Results     LAB RESULTS:      Lab 10/09/23  0359 10/08/23  0323 10/07/23  0948 10/07/23  0947 10/06/23  0027 10/05/23  0146 10/04/23  0814 10/03/23  1721 10/03/23  0727 10/03/23  0524   WBC 24.70* 39.70* 47.60*  --  76.00* 90.50* 83.00*   < >  --  68.00*   HEMOGLOBIN 7.8* 8.3* 6.6*  --  7.0* 7.3* 7.5*   < >  --  7.8*   HEMATOCRIT 23.8* 25.2* 21.2*  --  22.1* 24.4* 24.3*   < >  --  24.8*   PLATELETS 43* 49* 57*  --  64* 65* 65*   < >  --  58*   NEUTROS ABS 4.20 6.35 9.04*  --  6.84 15.39* 14.94*   < >  --  12.92*   EOS ABS  --   --  1.90*  --   --   --  0.83*  --   --   --    MCV 98.1* 100.3* 106.9*  --  107.1* 109.8* 106.6*   < >  --  106.3*   LACTATE  --   --   --   --   --   --   --   --  0.6  --    * 903*  --  982* 928* 878*  --    < >  --   --    PROTIME  --   --   --   --   --   --   --   --   --  11.0   APTT  --   --   --   --   --   --   --   --   --  26.7*    < > = values in this interval not displayed.         Lab 10/09/23  0359 10/08/23  1320 10/08/23  0323 10/07/23  1512 10/07/23  0947 10/06/23  0027 10/05/23  0146 10/03/23  2041 10/03/23  0524   SODIUM  --  138  --  137 138 138 138   < > 142   POTASSIUM  --  4.1  --  4.0 4.2 4.1 4.0   < > 4.5   CHLORIDE  --  104  --  103 104 104 104   < > 108*   CO2  --  22.0  --  24.0 24.0 23.0 21.0*   < > 24.0   ANION GAP  --  12.0  --  10.0 10.0 11.0 13.0   < > 10.0   BUN  --  32*  --  34* 32* 30* 33*   < > 37*   CREATININE  --  1.10*  --  1.27* 1.27* 1.69* 1.50*   < > 1.65*   EGFR  --  49.0*  --  41.3* 41.3* 29.3* 33.8*   < > 30.1*   GLUCOSE  --  89  --  115* 110* 97 99   < > 110*   CALCIUM  --  8.7  --  8.5* 8.6 8.7 9.1   < > 9.0   MAGNESIUM 2.0  --  2.1  --  2.0 2.1 2.2   < > 2.4   PHOSPHORUS 3.4  --  4.1  --  3.8 4.4 4.4   < >  --    HEMOGLOBIN A1C  --   --   --   --   --   --   --   --  6.40*    TSH  --   --   --   --   --   --   --   --  6.010*    < > = values in this interval not displayed.         Lab 10/07/23  0947 10/06/23  0027 10/05/23  0146 10/04/23  0813 10/03/23  2041   TOTAL PROTEIN 6.2 5.8* 5.9* 6.4 6.2   ALBUMIN 3.8 3.5 3.7 3.9 3.8   GLOBULIN 2.4 2.3 2.2 2.5 2.4   ALT (SGPT) 18 13 13 10 14   AST (SGOT) 28 25 24 22 20   BILIRUBIN 0.3 0.2 0.3 0.4 0.3   ALK PHOS 65 59 59 71 64         Lab 10/07/23  0947 10/06/23  2041 10/06/23  1653 10/03/23  0724 10/03/23  0524   PROBNP  --   --   --   --  2,457.0*   HSTROP T 16* 18* 19* 17* 18*   PROTIME  --   --   --   --  11.0   INR  --   --   --   --  1.01             Lab 10/07/23  1512 10/03/23  0950 10/03/23  0724   IRON  --   --  123   IRON SATURATION (TSAT)  --   --  35   TIBC  --   --  350   TRANSFERRIN  --   --  235   FOLATE  --  5.27  --    VITAMIN B 12  --  1,521*  --    ABO TYPING A  --  A   RH TYPING Positive  --  Positive   ANTIBODY SCREEN Negative  --  Negative         Brief Urine Lab Results  (Last result in the past 365 days)        Color   Clarity   Blood   Leuk Est   Nitrite   Protein   CREAT   Urine HCG        10/03/23 2003 Yellow   Clear   Negative   Negative   Negative   Negative                 Microbiology Results (last 10 days)       Procedure Component Value - Date/Time    Blood Culture - Blood, Wrist, Left [[id number]]  (Normal) Collected: 10/03/23 2041    Lab Status: Final result Specimen: Blood from Wrist, Left Updated: 10/08/23 2100     Blood Culture No growth at 5 days    Blood Culture - Blood, Arm, Right [[id number]]  (Normal) Collected: 10/03/23 0724    Lab Status: Final result Specimen: Blood from Arm, Right Updated: 10/08/23 0730     Blood Culture No growth at 5 days    Blood Culture - Blood, Wrist, Left [[id number]]  (Normal) Collected: 10/03/23 0700    Lab Status: Final result Specimen: Blood from Wrist, Left Updated: 10/08/23 0715     Blood Culture No growth at 5 days    Narrative:      Less than seven (7) mL's of blood was  collected.  Insufficient quantity may yield false negative results.            IR Bone marrow biopsy and aspiration    Result Date: 10/6/2023  Impression: Impression: Successful bone marrow aspiration and biopsy under fluoroscopy Electronically Signed: Deepak Pineda MD  10/6/2023 1:33 PM EDT  Workstation ID: [id number]    US Renal Bilateral    Result Date: 10/4/2023  Impression: Impression: 1. Increased echogenicity in both kidneys suggesting medical renal disease. 2. No evidence of obstructive uropathy. 3. 1.9 cm left renal cyst. Electronically Signed: Deepak Pineda MD  10/4/2023 11:31 AM EDT  Workstation ID: [id number]    XR Chest 1 View    Result Date: 10/3/2023  Impression: Impression: No active disease Electronically Signed: Francis Shah MD  10/3/2023 5:42 AM EDT  Workstation ID: [id number]             Results for orders placed during the hospital encounter of 10/03/23    Adult Transthoracic Echo Complete w/ Color, Spectral and Contrast if necessary per protocol    Interpretation Summary    Estimated right ventricular systolic pressure from tricuspid regurgitation is normal (<35 mmHg).    Conclusion      Normal LV size and contractility EF of 60 to 65%  Mild right ventricular enlargement seen.  Normal atrial size  Pulmonic valve is not well visualized.  Aortic valve is mildly calcified and sclerosed.  Dopplers did not reveal any significant abnormality.  Mitral valve, tricuspid valve appears structurally normal, mild tricuspid and mitral regurgitation seen.  Calculated RV systolic pressure of 25 mmHg.  No pericardial effusion seen.  Proximal aorta appears normal in size.      Labs Pending at Discharge:  Pending Labs       Order Current Status    Bone Marrow Exam In process    Bone Marrow Exam In process    Cytogenetics (Integrated Oncology) In process    Flow Cytometry In process            Procedures Performed           Consults:   Consults       Date and Time Order Name Status Description    10/4/2023  8:13 AM  "Inpatient Nephrology Consult Completed     10/3/2023  8:20 AM Inpatient Cardiology Consult Completed     10/3/2023  8:19 AM Inpatient Gastroenterology Consult Completed     10/3/2023  7:26 AM Hematology & Oncology Inpatient Consult      10/3/2023  6:49 AM Hospitalist (on-call MD unless specified)                Discharge Details        Discharge Medications        New Medications        Instructions Start Date   Allopurinol 200 MG tablet   200 mg, Oral, Daily   Start Date: October 10, 2023     hydroxyurea 500 MG capsule  Commonly known as: HYDREA   1,000 mg, Oral, 2 Times Daily      melatonin 5 MG tablet tablet   5 mg, Oral, Nightly      pregabalin 75 MG capsule  Commonly known as: LYRICA   75 mg, Oral, Daily   Start Date: October 10, 2023            Continue These Medications        Instructions Start Date   atorvastatin 20 MG tablet  Commonly known as: LIPITOR   20 mg, Oral, Daily      carvedilol 12.5 MG tablet  Commonly known as: COREG   12.5 mg, Oral, 2 Times Daily With Meals      ferrous sulfate 325 (65 FE) MG tablet   325 mg, Oral, Daily With Breakfast      fluticasone 50 MCG/ACT nasal spray  Commonly known as: FLONASE   2 sprays, Nasal, Daily      loratadine 10 MG tablet  Commonly known as: CLARITIN   10 mg, Oral, Daily      nitroglycerin 0.4 MG SL tablet  Commonly known as: NITROSTAT   0.4 mg, Sublingual, Every 5 Minutes PRN, Take no more than 3 doses in 15 minutes.      pantoprazole 40 MG EC tablet  Commonly known as: PROTONIX   40 mg, Oral, Daily      Viteyes AREDS Formula capsule   1 capsule, Oral, Daily             Stop These Medications      aspirin 81 MG chewable tablet     candesartan-hydrochlorothiazide 32-12.5 MG per tablet  Commonly known as: Atacand HCT     clopidogrel 75 MG tablet  Commonly known as: PLAVIX     spironolactone 25 MG tablet  Commonly known as: ALDACTONE              Allergies   Allergen Reactions    Aspirin Other (See Comments)     Pt reports \"makes my heart go crazy\"    Codeine " GI Intolerance    Penicillins Hives    Lisinopril Cough         Discharge Disposition:   Home or Self Care    Diet:  Hospital:  Diet Order   Procedures    Diet: Cardiac Diets; Healthy Heart (2-3 Na+); Texture: Regular Texture (IDDSI 7); Fluid Consistency: Thin (IDDSI 0)         Discharge Activity:   Activity Instructions       Activity as Tolerated                CODE STATUS:  Code Status and Medical Interventions:   Ordered at: 10/04/23 1633     Medical Intervention Limits:    NO intubation (DNI)     Level Of Support Discussed With:    Patient     Code Status (Patient has no pulse and is not breathing):    No CPR (Do Not Attempt to Resuscitate)     Medical Interventions (Patient has pulse or is breathing):    Limited Support         Future Appointments   Date Time Provider Department Center   11/16/2023  4:00 PM Rodriguez Glass MD [facility]       Time spent on Discharge including face to face service:  45 minutes    Signature: Electronically signed by Monet Bonner MD, 10/09/23, 10:27 EDT.  [facility] Hospitalist Team

## 2023-10-09 NOTE — PROGRESS NOTES
A prescription was released to [facility] for   Drug: Venclexta (venetoclax)  Strength: 10 mg  Directions: Take 1 tablet by mouth daily for one day. Take 10mg on day 1 with food  Quantity: 1  Refills: 0     Drug: Venclexta (venetoclax)  Strength: 10 mg  Directions: Take 2 tablets by mouth daily for one day. Take 20mg on day 2 with food  Quantity: 2  Refills: 0     Drug: Venclexta (venetoclax)  Strength: 50 mg  Directions: Take 1 tablets by mouth daily for one day. Take 50mg on day 3 with food  Quantity: 1  Refills: 0     Drug: Venclexta (venetoclax)  Strength: 100 mg  Directions: Take 1 tablets by mouth daily for one day. Take 100 mg daily starting on day 4 and thereafter with food.  Quantity: 30  Refills: 3    Completed independent double check on medication order/RX.  Regina Thorpe, PharmD, BCPS

## 2023-10-09 NOTE — PROGRESS NOTES
Verified demographics with patients son. She was sleeping. Pt is agreeable to services and has no other agency. Son says sometimes she doesn't hear the phone so to call his number if she doesn't answer.    Nursing and PT    Dr. Danish Herbert will be the following provider    Pharmacy:[facility] in [place]

## 2023-10-09 NOTE — PROGRESS NOTES
Specialty Pharmacy Note     Prior Authorization was done thru covermymeds. It was approved for Venetoclax (Venclexta) 100mg. Coverage is from 7/10/23 to 10/8/24. Test claim in [facility] returned a $0 co-pay for all of Venetoclax (Venclexta) prescriptions.     Andrew Flores - CARE COORDINATOR  10/9/2023  14:35 EDT

## 2023-10-09 NOTE — CASE MANAGEMENT/SOCIAL WORK
Case Management Discharge Note      Final Note: [facility]    Provided Post Acute Provider List?: Yes  Post Acute Provider List: Home Health  Delivered To: Patient, Support Person  Support Person: Zane  Method of Delivery: In person     Home Medical Care Coordination complete. Patient indicates having no preference.      Service Provider Selected Services Address Phone Fax Patient Preferred     [facility] ,  Home Rehabilitation [address] [place] [address] [phone] --                Community Resources Coordination complete.      Service Provider Selected Services Address Phone Fax Patient Preferred    [facility]  [address] [facility] [place] [address] [phone] --               Transportation Services  Private: Car    Final Discharge Disposition Code: 06 - home with home health care

## 2023-10-09 NOTE — TELEPHONE ENCOUNTER
Per Specialty Pharmacy note from today::   Drug-Drug Interactions: The current medication list was reviewed and there are some relevant drug-drug interactions with the oral specialty medication that will be discussed during education, including:carvedilol (category D interaction  with venetoclax); atorvastatin and clopidogrel both with voriconazole.  Regarding these interactions, the prescriber has been contacted for suggestions or alternative agents.     Treatment Plan: Venclexta (venetoclax)+decitabine IV  Start date of treatment planned for: As soon as oral specialty medication is available. Patient will start infusion prior to oral medication so as to not delay the start of treatment  Indication: acute myelomonocytic leukemia    Looks like the Plavix was recently stopped while the patient was inpatient at [facility], but no indication as to why.     Please advise if Carvedilol or Atorvastatin can be adjusted due to the increased side effects with the chemo medication.

## 2023-10-09 NOTE — CASE MANAGEMENT/SOCIAL WORK
Continued Stay Note  [facility]     Patient Name: Tricia Marroquin  MRN: [id number]  Today's Date: 10/9/2023    Admit Date: 10/3/2023    Plan: Home at discharge. [facility] accepted, order placed. [facility] referral placed.   Discharge Plan       Row Name 10/09/23 1625       Plan    Plan Home at discharge. [facility] accepted, order placed. [facility] referral placed.    Plan Comments [facility] accepted, order placed. CM called son to inform of acceptance, CM left VM.      Row Name 10/09/23 1217       Plan    Plan Home at discharge. [facility] pending acceptance. [facility] referral placed.    Patient/Family in Agreement with Plan yes    Provided Post Acute Provider List? Yes    Post Acute Provider List Home Health    Delivered To Patient;Support Person    Support Person Son-Michael    Method of Delivery In person    Plan Comments Patient will discharge home alone. CM met with patient and son at bedside regarding HH. Patient and son agreeable, they do not have a preference. CM placed referral to [facility], message sent to liamaury Barber, pending acceptance. Son expressed need for caregiver assistance, CM mentioned [facility], patient agreeable to referral. CM submitted [facility] referral via [employer] portal.              Cyn Sandoval RN, BSN  3A/2A   [address] [address]  Phone: [phone]  Fax: [phone]

## 2023-10-09 NOTE — PROGRESS NOTES
"                                                                                                                                      Nephrology  Progress Note                                        [facility]    Patient Identification    Name: Tricia Marroquin  Age: 88 y.o.  Sex: female  :  1935  MRN: [id number]      DATE OF SERVICE:  10/9/2023        Subective    Feeling better        Objective   Scheduled Meds:allopurinol, 200 mg, Oral, Daily  atorvastatin, 20 mg, Oral, Daily  carvedilol, 12.5 mg, Oral, BID With Meals  cetirizine, 10 mg, Oral, Daily  ferrous sulfate, 324 mg, Oral, Daily With Breakfast  hydroxyurea, 1,000 mg, Oral, BID  melatonin, 5 mg, Oral, Nightly  pantoprazole, 40 mg, Oral, Daily  pregabalin, 75 mg, Oral, Daily  senna-docusate sodium, 2 tablet, Oral, BID  sodium chloride, 10 mL, Intravenous, Q12H          Continuous Infusions:     PRN Meds:  acetaminophen    senna-docusate sodium **AND** polyethylene glycol **AND** bisacodyl **AND** bisacodyl    nitroglycerin    [COMPLETED] Insert Peripheral IV **AND** sodium chloride    sodium chloride    sodium chloride     Exam:  /41 (BP Location: Left arm, Patient Position: Lying)   Pulse 57   Temp 97.9 øF (36.6 øC) (Oral)   Resp 20   Ht 154.9 cm (61\")   Wt 80.5 kg (177 lb 7.5 oz)   SpO2 94%   BMI 33.53 kg/mý     Intake/Output last 3 shifts:  I/O last 3 completed shifts:  In: 1475 [P.O.:1175; Blood:300]  Out: -     Intake/Output this shift:  I/O this shift:  In: 720 [P.O.:720]  Out: -     Physical exam:  General Appearance:  Alert  Head:  Normocephalic, without obvious abnormality, atraumatic  Eyes:  PERRL, conjunctiva/corneas clear     Neck:  Supple,  no adenopathy;      Lungs:  Decreased BS occasion ronchi  Heart:  Regular rate and rhythm, S1 and S2 normal  Abdomen:  Soft, non-tender, bowel sounds active   Extremities: trace edema  Pulses: 2+ and symmetric all extremities  Skin:  No rashes or lesions       Data " Review:  All labs (24hrs):   Recent Results (from the past 24 hour(s))   Basic Metabolic Panel    Collection Time: 10/08/23  1:20 PM    Specimen: Blood   Result Value Ref Range    Glucose 89 65 - 99 mg/dL    BUN 32 (H) 8 - 23 mg/dL    Creatinine 1.10 (H) 0.57 - 1.00 mg/dL    Sodium 138 136 - 145 mmol/L    Potassium 4.1 3.5 - 5.2 mmol/L    Chloride 104 98 - 107 mmol/L    CO2 22.0 22.0 - 29.0 mmol/L    Calcium 8.7 8.6 - 10.5 mg/dL    BUN/Creatinine Ratio 29.1 (H) 7.0 - 25.0    Anion Gap 12.0 5.0 - 15.0 mmol/L    eGFR 49.0 (L) >60.0 mL/min/1.73   Prepare RBC, 1 Units    Collection Time: 10/08/23  2:00 PM   Result Value Ref Range    Product Code K5403R47     Unit Number [id number]     UNIT  ABO A     UNIT  RH POS     Crossmatch Interpretation Compatible     Dispense Status PT     Blood Expiration Date [id number]     Blood Type Barcode 6200    Uric Acid    Collection Time: 10/09/23  3:59 AM    Specimen: Blood   Result Value Ref Range    Uric Acid 7.8 (H) 2.4 - 5.7 mg/dL   Lactate Dehydrogenase    Collection Time: 10/09/23  3:59 AM    Specimen: Blood   Result Value Ref Range     (H) 135 - 214 U/L   Phosphorus    Collection Time: 10/09/23  3:59 AM    Specimen: Blood   Result Value Ref Range    Phosphorus 3.4 2.5 - 4.5 mg/dL   Magnesium    Collection Time: 10/09/23  3:59 AM    Specimen: Blood   Result Value Ref Range    Magnesium 2.0 1.6 - 2.4 mg/dL   CBC Auto Differential    Collection Time: 10/09/23  3:59 AM    Specimen: Blood   Result Value Ref Range    WBC 24.70 (H) 3.40 - 10.80 10*3/mm3    RBC 2.43 (L) 3.77 - 5.28 10*6/mm3    Hemoglobin 7.8 (L) 12.0 - 15.9 g/dL    Hematocrit 23.8 (L) 34.0 - 46.6 %    MCV 98.1 (H) 79.0 - 97.0 fL    MCH 31.9 26.6 - 33.0 pg    MCHC 32.6 31.5 - 35.7 g/dL    RDW 22.7 (H) 12.3 - 15.4 %    RDW-SD 81.4 (H) 37.0 - 54.0 fl    MPV 10.1 6.0 - 12.0 fL    Platelets 43 (C) 140 - 450 10*3/mm3   Scan Slide    Collection Time: 10/09/23  3:59 AM    Specimen: Blood   Result Value Ref Range     Scan Slide     Manual Differential    Collection Time: 10/09/23  3:59 AM    Specimen: Blood   Result Value Ref Range    Neutrophil % 14.0 (L) 42.7 - 76.0 %    Lymphocyte % 19.0 (L) 19.6 - 45.3 %    Monocyte % 10.0 5.0 - 12.0 %    Bands %  3.0 0.0 - 5.0 %    Metamyelocyte % 2.0 (H) 0.0 - 0.0 %    Blasts % 52.0 (H) 0.0 - 0.0 %    Neutrophils Absolute 4.20 1.70 - 7.00 10*3/mm3    Lymphocytes Absolute 4.69 (H) 0.70 - 3.10 10*3/mm3    Monocytes Absolute 2.47 (H) 0.10 - 0.90 10*3/mm3    Anisocytosis Slight/1+ None Seen    Poikilocytes Slight/1+ None Seen    WBC Morphology Normal Normal    Platelet Estimate Decreased Normal          Imaging:  IR Bone marrow biopsy and aspiration    Result Date: 10/6/2023  Impression: Successful bone marrow aspiration and biopsy under fluoroscopy Electronically Signed: Deepak Pineda MD  10/6/2023 1:33 PM EDT  Workstation ID: [id number]    US Renal Bilateral    Result Date: 10/4/2023  Impression: 1. Increased echogenicity in both kidneys suggesting medical renal disease. 2. No evidence of obstructive uropathy. 3. 1.9 cm left renal cyst. Electronically Signed: Deepak Pineda MD  10/4/2023 11:31 AM EDT  Workstation ID: [id number]    XR Chest 1 View    Result Date: 10/3/2023  Impression: No active disease Electronically Signed: Francis Shah MD  10/3/2023 5:42 AM EDT  Workstation ID: [id number]     Assessment/Plan:     Dyspnea    Shortness of breath    Coronary artery disease involving native coronary artery of native heart with unstable angina pectoris    Essential hypertension    Dyslipidemia    Thrombocytopenia    Dizziness    History of MI (myocardial infarction)    Leukocytosis    Symptomatic anemia       Acute kidney injury likely secondary to prerenal component  Check arrhythmias  Hypertension  Coronary artery disease  Leukocytosis  Possible GI bleed  Left renal cyst     Recommendations:   Today's labs pending   Noted uric acid needs to be on allopurinol  Creatinine has been stable   Need  follow-up

## 2023-10-09 NOTE — TELEPHONE ENCOUNTER
Per  - he would like to know if either Toprol 50mg OR Zebeta 10mg have any drug-drug interactions with the Venetoclax medication. Either one would be acceptable to substitute for the carvedilol, but he wants to verify interactions with you first.

## 2023-10-09 NOTE — PROGRESS NOTES
Oral Chemotherapy - New Referral    Received a referral from Dr. Gutierrez    Treatment Plan: Venclexta (venetoclax)+decitabine IV  Start date of treatment planned for: As soon as oral specialty medication is available. Patient will start infusion prior to oral medication so as to not delay the start of treatment  Indication: acute myelomonocytic leukemia  Relevant past treatments: hydrea  Is the therapy appropriate based on treatment guidelines and FDA labeling?: yes  Therapeutic Goals: Continue treatment until progression or intolerable toxicity  Patient can self-administer oral medications: Yes    Drug-Drug Interactions: The current medication list was reviewed and there are some relevant drug-drug interactions with the oral specialty medication that will be discussed during education, including:carvedilol (category D interaction  with venetoclax); atorvastatin and clopidogrel both with voriconazole.  Regarding these interactions, the prescriber has been contacted for suggestions or alternative agents.     Medication Allergies: The patient has no relevant allergies as it relates to their oral specialty medication  Review of Labs/Dose Adjustments: The patient's most recent labs were reviewed and all are WNL to start treatment at this dose.     A prescription was released to [facility] for   Drug: Venclexta (venetoclax)  Strength: 10 mg  Directions: Take 1 tablet by mouth daily for one day. Take 10mg on day 1 with food  Quantity: 1  Refills: 0    Drug: Venclexta (venetoclax)  Strength: 10 mg  Directions: Take 2 tablets by mouth daily for one day. Take 20mg on day 2 with food  Quantity: 2  Refills: 0    Drug: Venclexta (venetoclax)  Strength: 50 mg  Directions: Take 1 tablets by mouth daily for one day. Take 50mg on day 3 with food  Quantity: 1  Refills: 0    Drug: Venclexta (venetoclax)  Strength: 100 mg  Directions: Take 1 tablets by mouth daily for one day. Take 100 mg daily starting  on day 4 and thereafter with food.  Quantity: 30  Refills: 3      Pharmacy education is not yet scheduled.    Roseline ROD, PharmD      10/9/2023  12:23 EDT

## 2023-10-10 ENCOUNTER — HOME CARE VISIT (OUTPATIENT)
Dept: HOME HEALTH SERVICES | Facility: HOME HEALTHCARE | Age: 88
End: 2023-10-10
Payer: COMMERCIAL

## 2023-10-10 ENCOUNTER — TELEPHONE (OUTPATIENT)
Dept: ONCOLOGY | Facility: CLINIC | Age: 88
End: 2023-10-10
Payer: MEDICARE

## 2023-10-10 PROCEDURE — G0299 HHS/HOSPICE OF RN EA 15 MIN: HCPCS

## 2023-10-10 NOTE — TELEPHONE ENCOUNTER
Great! Thank you for checking. I talked with  and he would prefer the patient switch to Zebeta 10mg. Would you be willing to send that in or would you like for me to do that?

## 2023-10-10 NOTE — TELEPHONE ENCOUNTER
Caller: MEGHA HARRIS    Relationship: Emergency Contact    Best call back number: [phone]      What was the call regarding: PATIENTS SON CALLED TO SEE IF PATIENTS BONE BIOPSY RESULTS WERE IN, IF SO THEY WOULD LIKE A CALL

## 2023-10-10 NOTE — TELEPHONE ENCOUNTER
Called pt son to see if pt was coming to appt today. He states he didn't get the message until after 5 yesterday evening. He states he spoke with someone at the hospital to cancel the appt and reschedule the appts. Appt was not canceled. He says he doesn't know what is wrong with his mother. Son also stated he didn't know if he wanted to go through with treatment for her due to her heart and other health issues. Son then disconnected the call

## 2023-10-10 NOTE — HOME HEALTH
"SOC Note: EDITA SEEN 10/10/23 FOR SKILLED NURSE START OF CARE. PATIENT RECENTLY HOSPITALIZED FOR [place] SECOND-DEGREE AV BLOCK. PATIENT WAS ALSO STARTED ON CHEMO FOR POSSIBLE LEUKEMIA DURING PREVIOUS HOSPITALIZATION AND IS AWAITING BIOPSY RESULTS. PATIENT HAS A NON REMOVABLE DRESSING TO RIGHT BUTTOCK.     Home Health ordered for: disciplines SN/PT    Reason for Hosp/Primary Dx/Co-morbidities: WENCKEBACH SECOND-DEGREE AV BLOCK    Focus of Care: [place]ENC[place] SECOND-DEGREE AV BLOCK    Patient's goal(s): \" TO GET BETTER\"    Current Functional status/mobility/DME: WALKER    HB status/Living Arrangements: PATIENT LIVES ALONE     Skin Integrity/wound status: NON REMOVABLE DRESSING TO REMOVE ON 10/12/23    Code Status: FULL CODE    Fall Risk/Safety concerns: HIGH    Medication issues/Concerns: NA    Additional Problems/Concerns: NA    SDOH Barriers (i.e. caregiver concerns, social isolation, transportation, food insecurity, environment, income etc.)/Need for MSW: NA    Plan for next visit: WOUND ASSESSMENT, CARDIOPULMONARY ASSESSMENT, GASTROINTESTINAL ASSESSMENT, SKIN ASSESSMENT, SAFETY ASSESSMENT, PAIN ASSESSMENT, MEDICATION ASSESSMENT"

## 2023-10-10 NOTE — OUTREACH NOTE
Prep Survey      Flowsheet Row Responses   [facility]? [facility]   Is LACE score < 7 ? No   Eligibility Readm Mgmt   Discharge diagnosis Dyspnea   Does the patient have one of the following disease processes/diagnoses(primary or secondary)? Other   Does the patient have Home health ordered? Yes   What is the Home health agency?  [facility]   Is there a DME ordered? No   Medication alerts for this patient See AVS   Prep survey completed? Yes            Felicia ZHANG - Registered Nurse

## 2023-10-10 NOTE — TELEPHONE ENCOUNTER
Called Pt's # and s/w her son. He stated he was waiting on providers to make appts for himself and he did not have any help. I stressed several times that Dr Gutierrez wanted them to come in ASAP. He said he would call us. I made sure he had our # to call, he stated he did.

## 2023-10-10 NOTE — TELEPHONE ENCOUNTER
Called Pt's son Michael 2x. Was calling to [facility] an appt for Dr Gutierrez, she wants to see the Pt ASAP in regards to her diagnosis. We have a place to [facility] her tomorrow, 11/11 at 930 am.   No answer and unable to leave v/m. Phone did give a recording but it was for someone of the last name Russ and a different # to call. I did not leave a message. I did call 2x to verify I was calling the correct #.

## 2023-10-11 VITALS
SYSTOLIC BLOOD PRESSURE: 100 MMHG | DIASTOLIC BLOOD PRESSURE: 60 MMHG | OXYGEN SATURATION: 98 % | HEART RATE: 76 BPM | RESPIRATION RATE: 17 BRPM | TEMPERATURE: 97 F

## 2023-10-12 ENCOUNTER — READMISSION MANAGEMENT (OUTPATIENT)
Dept: CALL CENTER | Facility: HOSPITAL | Age: 88
End: 2023-10-12
Payer: MEDICARE

## 2023-10-12 ENCOUNTER — HOME CARE VISIT (OUTPATIENT)
Dept: HOME HEALTH SERVICES | Facility: HOME HEALTHCARE | Age: 88
End: 2023-10-12
Payer: COMMERCIAL

## 2023-10-12 ENCOUNTER — TELEPHONE (OUTPATIENT)
Dept: ONCOLOGY | Facility: CLINIC | Age: 88
End: 2023-10-12
Payer: MEDICARE

## 2023-10-12 PROCEDURE — G0152 HHCP-SERV OF OT,EA 15 MIN: HCPCS

## 2023-10-12 NOTE — TELEPHONE ENCOUNTER
PATIENT'S SON CALLED AND WAS WANTING THE RESULTS OF THE BONE MARROW BIOPSY THAT HIS MOM HAD DONE WHILE SHE WAS INPATIENT AT [facility].  HE HAD TALKED TO HONEY AND JANUSZ SEVERAL TIMES AND SHE HAD AN APPOINTMENT TO SEE [facility] AND TO DO TREATMENT SAME DAY AND HER SON CANCELED IT AND REFUSED TO BRING HER IN.  HE STATED THAT HE WANTS THE RESULTS TO BE GIVEN OVER THE PHONE AND THEN PATIENT WILL MAKE THE DECISION IF SHE WANTS TO DO TREATMENT OR NOT.  INFORMATION WAS GIVEN TO KALPANA AND SHE CONTACTED DR ENCARNACION WITH THE SON'S PHONE NUMBER AND DR ENCARNACION CALLED AND HE DID NOT ANSWER, DR ENCARNACION HAD ALSO TRIED CONTACTING THE PATIENT THIS MORNING AND NO ANSWER.  MESSAGES WERE LEFT BY DR ENCARNACION ON BOTH NUMBERS.

## 2023-10-12 NOTE — OUTREACH NOTE
Medical Week 1 Survey      Flowsheet Row Responses   [facility] patient discharged from? [facility]   Does the patient have one of the following disease processes/diagnoses(primary or secondary)? Other   Week 1 attempt successful? Yes   Call start time 1632   Call end time 1639   Discharge diagnosis Dyspnea   Person spoke with today (if not patient) and relationship kristi Rodriguez   Meds reviewed with patient/caregiver? Yes   Is the patient having any side effects they believe may be caused by any medication additions or changes? No   Does the patient have all medications ordered at discharge? Yes   Is the patient taking all medications as directed (includes completed medication regime)? Yes   Does the patient have a primary care provider?  Yes   Does the patient have an appointment with their PCP within 7 days of discharge? No   What is preventing the patient from scheduling follow up appointments within 7 days of discharge? Waiting on return call   Nursing Interventions Urgent appointment needed, Advised patient to make appointment   Has the patient kept scheduled appointments due by today? N/A   What is the Home health agency?  [facility]   Has home health visited the patient within 72 hours of discharge? Yes   Psychosocial issues? No   Comments son manages pt's medications   Did the patient receive a copy of their discharge instructions? Yes   Nursing interventions Reviewed instructions with patient   What is the patient's perception of their health status since discharge? Improving   Is the patient/caregiver able to teach back signs and symptoms related to disease process for when to call PCP? Yes   Is the patient/caregiver able to teach back signs and symptoms related to disease process for when to call 911? Yes   Is the patient/caregiver able to teach back the hierarchy of who to call/visit for symptoms/problems? PCP, Specialist, Home health nurse, Urgent Care, ED, 911 Yes   Week 1 call completed? Yes    Call end time [id number]            Jaelyn HANNA - Registered Nurse

## 2023-10-13 ENCOUNTER — HOME CARE VISIT (OUTPATIENT)
Dept: HOME HEALTH SERVICES | Facility: HOME HEALTHCARE | Age: 88
End: 2023-10-13
Payer: COMMERCIAL

## 2023-10-13 VITALS
TEMPERATURE: 98.1 F | OXYGEN SATURATION: 98 % | HEART RATE: 101 BPM | SYSTOLIC BLOOD PRESSURE: 100 MMHG | DIASTOLIC BLOOD PRESSURE: 58 MMHG

## 2023-10-13 VITALS
RESPIRATION RATE: 17 BRPM | TEMPERATURE: 98 F | DIASTOLIC BLOOD PRESSURE: 60 MMHG | HEART RATE: 71 BPM | SYSTOLIC BLOOD PRESSURE: 110 MMHG | OXYGEN SATURATION: 98 %

## 2023-10-13 LAB
LAB AP CASE REPORT: NORMAL
LAB AP CLINICAL INFORMATION: NORMAL
LAB AP DIAGNOSIS COMMENT: NORMAL
LAB AP FLOW CYTOMETRY SUMMARY: NORMAL
PATH REPORT.ADDENDUM SPEC: NORMAL
PATH REPORT.FINAL DX SPEC: NORMAL
PATH REPORT.GROSS SPEC: NORMAL

## 2023-10-13 PROCEDURE — G0299 HHS/HOSPICE OF RN EA 15 MIN: HCPCS

## 2023-10-13 NOTE — HOME HEALTH
Pt is a 89 yo female who lives in an apt. for handicap.   Pt recently hospitalized secondary to Afib      PLOF pt independent with all self care and light IADLs      Currently pt independent with feeding grooming and toileting.   Pt requries MOD assist with bathing and dressing total assist with IADLs      Skilled OT for ther ex transfers and IADLs.   Pt and therapist in agreement with goals and poc.      Next visit bathing dressing and transfers      Pt denies any falls or med changes    OT to request 5 additional visits

## 2023-10-13 NOTE — HOME HEALTH
Routine Visit Note:EDITA SEEN 10/13/23 FOR ROUTINE SKILLED NURSE VISIT AND WOUND ASSESSMENT. PATIENT HAD A NON REMOVABLE DRESSING ON ADMISSION FOR A BIOPSY SPOT. WHEN DRESSING REMOVED THERE IS NOTHING THERE WOUND REMOVED FROM WOUND ASSESSMENT. NOTHING EXISIT TO TAKE A PICTURE OF SO NO PICTURE TAKEN. PATIENT IS ALERT AND ORIENTED X4, CONTINENT OF BOWEL AND BLADDER WITH LAST BM 10/13/23,  VS WNL, LUNGS CLEAR, SKIN IS CLEAR OF NEW AREAS, DENIES FALLS, DENIES PAIN, DENIES MEDICATION CHANGES, AND BOTTLES REVIEWED. PATIENT TOLERATED HIGH RISK MEDICATION TEACHING WELL AND STATED UNDERSTANDING.     HOMEBOUND STATUS: IS HOMEBOUND DUE TO WEAKNESS, AMBULATION REQUIRES ASSISTANCE, LIMITED ENDURANCE, POOR COORDINATION, DIFFICULTY AMBULATING, GAIT LIMITED TO HOUSEHOLD DISTANCES, IMPAIRED DRIVING ABILITY, MECHANICAL ASSISITANCE, FALL RISK, AND IMPAIRED GAIT.      Skill/education provided: CARDIOPULMONARY ASSESSMENT, GASTROINTESTINAL ASSESSMENT, SKIN ASSESSMENT, SAFETY ASSESSMENT, PAIN ASSESSMENT, MEDICATION ASSESSMENT     Patient/caregiver response: PATIENT STATED UNDERSTANDING      Plan for next visit: CARDIOPULMONARY ASSESSMENT, GASTROINTESTINAL ASSESSMENT, SKIN ASSESSMENT, SAFETY ASSESSMENT, PAIN ASSESSMENT, MEDICATION ASSESSMENT        Other pertinent info: NA

## 2023-10-14 LAB
QT INTERVAL: 362 MS
QTC INTERVAL: 426 MS

## 2023-10-15 ENCOUNTER — HOME CARE VISIT (OUTPATIENT)
Dept: HOME HEALTH SERVICES | Facility: HOME HEALTHCARE | Age: 88
End: 2023-10-15
Payer: COMMERCIAL

## 2023-10-16 ENCOUNTER — TELEPHONE (OUTPATIENT)
Dept: ONCOLOGY | Facility: CLINIC | Age: 88
End: 2023-10-16

## 2023-10-16 NOTE — TELEPHONE ENCOUNTER
Caller: MEGHA HARRIS    Relationship: Emergency Contact    Best call back number: [phone]      What was the call regarding: PT'S SON CALLED TO GET PATIENTS BONE MARROW RESULTS, PLEASE CALL TO ADVISE

## 2023-10-16 NOTE — TELEPHONE ENCOUNTER
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 He stated that the pt would like a second opinion and is planning to see Dr. Rush in [place]. He asked if I could send the bone marrow results there. I told him I would. Results faxed to Dr. Rush.

## 2023-10-17 ENCOUNTER — HOME CARE VISIT (OUTPATIENT)
Dept: HOME HEALTH SERVICES | Facility: HOME HEALTHCARE | Age: 88
End: 2023-10-17
Payer: COMMERCIAL

## 2023-10-17 VITALS
TEMPERATURE: 97.6 F | SYSTOLIC BLOOD PRESSURE: 106 MMHG | RESPIRATION RATE: 16 BRPM | HEART RATE: 67 BPM | DIASTOLIC BLOOD PRESSURE: 62 MMHG | OXYGEN SATURATION: 97 %

## 2023-10-17 PROCEDURE — G0299 HHS/HOSPICE OF RN EA 15 MIN: HCPCS

## 2023-10-18 ENCOUNTER — HOME CARE VISIT (OUTPATIENT)
Dept: HOME HEALTH SERVICES | Facility: HOME HEALTHCARE | Age: 88
End: 2023-10-18
Payer: COMMERCIAL

## 2023-10-18 NOTE — HOME HEALTH
Routine Visit Note:EDITA SEEN 10/17/23 FOR ROUTINE SKILLED NURSE VISIT. PATIENT IS FEELING BETTER TODAY THAN LAST WEEK. PATIENT INSTRUCTED THAT SHE NEEDS TO FOLLOW UP WITH PCP, CARDIOLOGY AND ONCOLOGY. PATIENT IS SWITCHING TO A CLOSER ONCOLOGIST DR. CONNER.  PATIENT IS ALERT AND ORIENTED X4, CONTINENT OF BOWEL AND BLADDER WITH LAST BM 10/17/23,  VS WNL, LUNGS CLEAR, SKIN IS CLEAR OF NEW AREAS, DENIES FALLS, DENIES PAIN, DENIES MEDICATION CHANGES, AND BOTTLES REVIEWED. PATIENT TOLERATED HIGH RISK MEDICATION TEACHING WELL AND STATED UNDERSTANDING.     HOMEBOUND STATUS: IS HOMEBOUND DUE TO WEAKNESS, AMBULATION REQUIRES ASSISTANCE, LIMITED ENDURANCE, POOR COORDINATION, DIFFICULTY AMBULATING, GAIT LIMITED TO HOUSEHOLD DISTANCES, IMPAIRED DRIVING ABILITY, MECHANICAL ASSISITANCE, FALL RISK, AND IMPAIRED GAIT.      Skill/education provided: CARDIOPULMONARY ASSESSMENT, GASTROINTESTINAL ASSESSMENT, SKIN ASSESSMENT, SAFETY ASSESSMENT, PAIN ASSESSMENT, MEDICATION ASSESSMENT     Patient/caregiver response: PATIENT STATED UNDERSTANDING      Plan for next visit: CARDIOPULMONARY ASSESSMENT, GASTROINTESTINAL ASSESSMENT, SKIN ASSESSMENT, SAFETY ASSESSMENT, PAIN ASSESSMENT, MEDICATION ASSESSMENT        Other pertinent info: NA

## 2023-10-19 LAB
LAB AP CASE REPORT: NORMAL
LAB AP CLINICAL INFORMATION: NORMAL
LAB AP DIAGNOSIS COMMENT: NORMAL
LAB AP FLOW CYTOMETRY SUMMARY: NORMAL
LAB AP INTEGRATED ONCOLOGY, ADDENDUM: NORMAL
Lab: NORMAL
PATH REPORT.ADDENDUM SPEC: NORMAL
PATH REPORT.FINAL DX SPEC: NORMAL
PATH REPORT.GROSS SPEC: NORMAL
REF LAB TEST METHOD: NORMAL

## 2023-10-20 ENCOUNTER — HOME CARE VISIT (OUTPATIENT)
Dept: HOME HEALTH SERVICES | Facility: HOME HEALTHCARE | Age: 88
End: 2023-10-20
Payer: COMMERCIAL

## 2023-10-20 VITALS
HEART RATE: 76 BPM | DIASTOLIC BLOOD PRESSURE: 52 MMHG | SYSTOLIC BLOOD PRESSURE: 102 MMHG | OXYGEN SATURATION: 95 % | RESPIRATION RATE: 16 BRPM | TEMPERATURE: 98 F

## 2023-10-20 LAB — REF LAB TEST METHOD: NORMAL

## 2023-10-20 PROCEDURE — G0299 HHS/HOSPICE OF RN EA 15 MIN: HCPCS

## 2023-10-21 NOTE — HOME HEALTH
Routine Visit Note:EDITA SEEN 10/20/23 FOR ROUTINE SKILLED NURSE VISIT. PATIENT SAYS SHE DOESNT FEEL WELL TODAY AND IS HAVING ISSUES WITH SLEEP. PATIENT INSTRUCTED THAT SHE WAS ON MELATONIN WHILE IN HOSPITAL AND IT IS NOTICED THAT SON HAS MARKED IT OFF THE DISCHARGE SUMMARY. HE STOPPED THIS MEDICATION BECAUSE HE BELIEVES IT DOESNT WORK. EXPLAINED TO SON THAT THIS MEDICATION WORKS FOR SOME PEOPLE AND HIS MOTHER SHOULD TRY IT AS IT IS A MORE NATURAL FORM OF SLEEP AIDE THEN SOME OF THE PRESCRIPTIONS THAT HE NAMES SUCH AS TRAZADONE. EXPLAINED TO SON THAT HIS MOTHERS AGE IS ALMOST 20 YEARS HIS SENIOR AND IT VERY WELL COULD WORK FOR HER.  PATIENT IS ALERT AND ORIENTED X4, CONTINENT OF BOWEL AND BLADDER WITH LAST BM 10/20/23,  VS WNL, LUNGS CLEAR, SKIN IS CLEAR OF NEW AREAS, DENIES FALLS, DENIES PAIN, DENIES MEDICATION CHANGES, AND BOTTLES REVIEWED. PATIENT TOLERATED HIGH RISK MEDICATION TEACHING WELL AND STATED UNDERSTANDING.     HOMEBOUND STATUS: IS HOMEBOUND DUE TO WEAKNESS, AMBULATION REQUIRES ASSISTANCE, LIMITED ENDURANCE, POOR COORDINATION, DIFFICULTY AMBULATING, GAIT LIMITED TO HOUSEHOLD DISTANCES, IMPAIRED DRIVING ABILITY, MECHANICAL ASSISITANCE, FALL RISK, AND IMPAIRED GAIT.      Skill/education provided: CARDIOPULMONARY ASSESSMENT, GASTROINTESTINAL ASSESSMENT, SKIN ASSESSMENT, SAFETY ASSESSMENT, PAIN ASSESSMENT, MEDICATION ASSESSMENT     Patient/caregiver response: PATIENT STATED UNDERSTANDING      Plan for next visit: CARDIOPULMONARY ASSESSMENT, GASTROINTESTINAL ASSESSMENT, SKIN ASSESSMENT, SAFETY ASSESSMENT, PAIN ASSESSMENT, MEDICATION ASSESSMENT        Other pertinent info: NA

## 2023-10-24 ENCOUNTER — TELEPHONE (OUTPATIENT)
Dept: CARDIOLOGY | Facility: CLINIC | Age: 88
End: 2023-10-24
Payer: MEDICARE

## 2023-10-24 ENCOUNTER — HOME CARE VISIT (OUTPATIENT)
Dept: HOME HEALTH SERVICES | Facility: HOME HEALTHCARE | Age: 88
End: 2023-10-24
Payer: COMMERCIAL

## 2023-10-24 PROCEDURE — G0299 HHS/HOSPICE OF RN EA 15 MIN: HCPCS

## 2023-10-24 NOTE — TELEPHONE ENCOUNTER
Called and discussed patient/son's concerns with son per patient permission.     Discussed patient's heart rhythm is called   Sinus tachycardia with Wenckebach second-degree AV block     No current indication that there is a blockage in the arteries.

## 2023-10-25 VITALS
TEMPERATURE: 98 F | SYSTOLIC BLOOD PRESSURE: 110 MMHG | HEART RATE: 66 BPM | OXYGEN SATURATION: 99 % | RESPIRATION RATE: 16 BRPM | DIASTOLIC BLOOD PRESSURE: 58 MMHG

## 2023-10-25 NOTE — HOME HEALTH
Routine Visit Note:EDITA SEEN 10/24/23 FOR ROUTINE SKILLED NURSE VISIT. PATIENT STILL NOT FEELING WELL AND IS COMPLAINING OF BEING TIERD. SHE WILL SEE ONCOLOGY 10/25/23. PATIENT IS INSTRUCTED TO FOLLOW UP WITH DR. ZAVALETA BEFORE END OF WEEK.  PATIENT IS ALERT AND ORIENTED X4, CONTINENT OF BOWEL AND BLADDER WITH LAST BM 10/24/23,  VS WNL, LUNGS CLEAR, SKIN IS CLEAR OF NEW AREAS, DENIES FALLS, DENIES PAIN, DENIES MEDICATION CHANGES, AND BOTTLES REVIEWED. PATIENT TOLERATED HIGH RISK MEDICATION TEACHING WELL AND STATED UNDERSTANDING.     HOMEBOUND STATUS: IS HOMEBOUND DUE TO WEAKNESS, AMBULATION REQUIRES ASSISTANCE, LIMITED ENDURANCE, POOR COORDINATION, DIFFICULTY AMBULATING, GAIT LIMITED TO HOUSEHOLD DISTANCES, IMPAIRED DRIVING ABILITY, MECHANICAL ASSISITANCE, FALL RISK, AND IMPAIRED GAIT.      Skill/education provided: CARDIOPULMONARY ASSESSMENT, GASTROINTESTINAL ASSESSMENT, SKIN ASSESSMENT, SAFETY ASSESSMENT, PAIN ASSESSMENT, MEDICATION ASSESSMENT     Patient/caregiver response: PATIENT STATED UNDERSTANDING      Plan for next visit: CARDIOPULMONARY ASSESSMENT, GASTROINTESTINAL ASSESSMENT, SKIN ASSESSMENT, SAFETY ASSESSMENT, PAIN ASSESSMENT, MEDICATION ASSESSMENT        Other pertinent info: NA

## 2023-10-26 ENCOUNTER — HOME CARE VISIT (OUTPATIENT)
Dept: HOME HEALTH SERVICES | Facility: HOME HEALTHCARE | Age: 88
End: 2023-10-26
Payer: COMMERCIAL

## 2023-10-26 ENCOUNTER — SPECIALTY PHARMACY (OUTPATIENT)
Dept: PHARMACY | Facility: HOSPITAL | Age: 88
End: 2023-10-26
Payer: MEDICARE

## 2023-10-26 VITALS
OXYGEN SATURATION: 90 % | SYSTOLIC BLOOD PRESSURE: 102 MMHG | TEMPERATURE: 97.9 F | HEART RATE: 62 BPM | DIASTOLIC BLOOD PRESSURE: 62 MMHG

## 2023-10-26 PROCEDURE — G0152 HHCP-SERV OF OT,EA 15 MIN: HCPCS

## 2023-10-26 NOTE — PROGRESS NOTES
Specialty Pharmacy Note: [facility]    Notified that patient has transferred care closer to her home.  Specialty pharmacy will no longer follow patient.  If she does return to clinic, please consult again.    Thanks,    Roseline ROD, PharmD

## 2023-10-27 ENCOUNTER — HOME CARE VISIT (OUTPATIENT)
Dept: HOME HEALTH SERVICES | Facility: HOME HEALTHCARE | Age: 88
End: 2023-10-27
Payer: COMMERCIAL

## 2023-11-17 LAB
LAB AP CASE REPORT: NORMAL
LAB AP CLINICAL INFORMATION: NORMAL
LAB AP DIAGNOSIS COMMENT: NORMAL
LAB AP FLOW CYTOMETRY SUMMARY: NORMAL
LAB AP INTEGRATED ONCOLOGY, ADDENDUM 2: NORMAL
LAB AP INTEGRATED ONCOLOGY, ADDENDUM: NORMAL
Lab: NORMAL
PATH REPORT.ADDENDUM SPEC: NORMAL
PATH REPORT.FINAL DX SPEC: NORMAL
PATH REPORT.GROSS SPEC: NORMAL

## 2025-07-23 NOTE — CONSULTS
FERNANDOM for patient to call back to schedule surgery with Dr. De La Paz    GI CONSULT  NOTE:    Referring Provider:  Dr. Villagomez    Chief complaint: heart racing    Subjective .     History of present illness: Tricia Marroquin is a 88 y.o. female with history of hypertension, CAD with stenting () on Plavix and aspirin presented to the hospital with complaints of heart racing.  Reports this began around 3:00 this morning.  She has experienced this before, but not this severe.  She has been short of breath for weeks.  Denies chest pain.  No fever or chills.  She is not having any abdominal pain, nausea or vomiting.  Typically has regular bowel movements.  She does note having diarrhea last week which has resolved.  She states this was black.  She has been taking oral iron supplementation for the last month.  States she was started on this by her PCP due to anemia.  Otherwise, does not see any blood in her stool.  She does not take NSAIDs other than her daily aspirin.  She denies a family history of colon cancer, pancreatic cancer, liver cancer, gastric cancer or hematologic cancers that she is aware of.     Endo History:  2006 EGD ([facility])-antral polyp  3/2006 EGD/colonoscopy ([facility]) -gastritis, diverticulosis    Past Medical History:  Past Medical History:   Diagnosis Date    Hypertension     Seasonal allergies        Past Surgical History:  Past Surgical History:   Procedure Laterality Date    AORTA SURGERY      BACK SURGERY      CARDIAC CATHETERIZATION N/A 2022    Procedure: Left Heart Cath;  Surgeon: Rodriguez Glass MD;  Location: [facility] INVASIVE LOCATION;  Service: Cardiovascular;  Laterality: N/A;    CORONARY ANGIOPLASTY WITH STENT PLACEMENT  2022       Social History:  Social History     Tobacco Use    Smoking status: Former     Packs/day: 2.00     Years: 40.00     Pack years: 80.00     Types: Cigarettes     Start date:      Quit date:      Years since quittin.7    Smokeless tobacco: Never   Vaping Use    Vaping Use: Never used   Substance  Use Topics    Alcohol use: Never    Drug use: Never       Family History:  Family History   Problem Relation Age of Onset    Heart disease Mother     Heart disease Father     Heart attack Father     Lung cancer Brother         associated to intense cigarette smoking       Medications:  (Not in a hospital admission)      Scheduled Meds:senna-docusate sodium, 2 tablet, Oral, BID  sodium chloride, 10 mL, Intravenous, Q12H      Continuous Infusions:dilTIAZem, 5-15 mg/hr, Last Rate: 10 mg/hr (10/03/23 1057)      PRN Meds:.  acetaminophen    senna-docusate sodium **AND** polyethylene glycol **AND** bisacodyl **AND** bisacodyl    nitroglycerin    [COMPLETED] Insert Peripheral IV **AND** sodium chloride    sodium chloride    sodium chloride    ALLERGIES:  Aspirin, Codeine, Penicillins, and Lisinopril    ROS:  The following systems were reviewed and negative;   Constitution:  No fevers, chills, no unintentional weight loss  Skin: no rash, no jaundice  Eyes:  No blurry vision, no eye pain  HENT:  No change in hearing or smell  Resp:  No dyspnea or cough  CV:  No chest pain or palpitations  :  No dysuria, hematuria  Musculoskeletal:  No leg cramps or arthralgias  Neuro:  No tremor, no numbness  Psych:  No depression or confusion    Objective     Vital Signs:   Vitals:    10/03/23 0900 10/03/23 1006 10/03/23 1045 10/03/23 1055   BP: 118/65  117/46 108/49   Pulse: 84  93 92   Resp: 18      Temp:  98.5 øF (36.9 øC)     TempSrc:  Oral     SpO2: 96%  96% 94%   Weight:       Height:           Physical Exam:       General Appearance:    Awake and alert, in no acute distress   Head:    Normocephalic, without obvious abnormality, atraumatic   Throat:   No oral lesions, no thrush, oral mucosa moist   Lungs:     Respirations regular, even and unlabored   Chest Wall:    No abnormalities observed   Abdomen:     Soft, non-tender, no rebound or guarding, non-distended, no hepatosplenomegaly   Rectal:     Deferred   Extremities:   Moves all  extremities, no edema, no cyanosis   Pulses:   Pulses palpable and equal bilaterally   Skin:   No rash, no jaundice, normal palpation       Neurologic:   Cranial nerves 2 - 12 grossly intact, no asterixis       Results Review:   I reviewed the patient's labs and imaging.  CBC    Results from last 7 days   Lab Units 10/03/23  0524   WBC 10*3/mm3 68.00*   HEMOGLOBIN g/dL 7.8*   PLATELETS 10*3/mm3 58*     CMP   Results from last 7 days   Lab Units 10/03/23  0524   SODIUM mmol/L 142   POTASSIUM mmol/L 4.5   CHLORIDE mmol/L 108*   CO2 mmol/L 24.0   BUN mg/dL 37*   CREATININE mg/dL 1.65*   GLUCOSE mg/dL 110*   ALBUMIN g/dL 4.0   BILIRUBIN mg/dL 0.3   ALK PHOS U/L 66   AST (SGOT) U/L 25   ALT (SGPT) U/L 13   MAGNESIUM mg/dL 2.4     Cr Clearance Estimated Creatinine Clearance: 23 mL/min (A) (by C-G formula based on SCr of 1.65 mg/dL (H)).  Coag   Results from last 7 days   Lab Units 10/03/23  0524   INR  1.01   APTT seconds 26.7*     HbA1C   Lab Results   Component Value Date    HGBA1C 6.40 (H) 10/03/2023    HGBA1C 6.0 (H) 09/24/2022     Blood Glucose No results found for: POCGLU  Infection     UA      Radiology(recent) XR Chest 1 View    Result Date: 10/3/2023  Impression: No active disease Electronically Signed: Francis Shah MD  10/3/2023 5:42 AM EDT  Workstation ID: [id number]        ASSESSMENT AND PLAN:    88-year-old female with history of CAD with stenting on Plavix and aspirin presented to the hospital with complaints of heart palpitations.  GI has been consulted for anemia.  Was noted to have dark stools, however, has been taking oral iron supplementation over the last month.    -Macrocytic anemia  -Thrombocytopenia   -Marked leukocytosis  -Heart palpitations, tachycardia  -Shortness of air  -CAD with stenting on Plavix and aspirin    Plan  No overt GI blood loss noted other than dark stools which is likely secondary to iron use.  Her hemoglobin is 7.8.  Continue to monitor H&H transfuse for hemoglobin less than 7.   Iron studies are normal.  With her significant leukocytosis + 22% blasts and thrombocytopenia, concern for malignancy.  Hematology/oncology has been consulted.  Await recommendations.  Cardiology has also been consulted.  She is currently on a Cardizem drip.  No plan for endoscopic eval at this time especially with shortness of air and cardiac arrhythmias.  Continue supportive care.      I discussed the patients findings and my recommendations with the patient.    We appreciate the referral    Electronically signed by [facility], 10/03/23, 11:41 AM EDT.

## (undated) DEVICE — BOWL PLSTC MD 16OZ BLU STRL

## (undated) DEVICE — ELECTRD DEFIB M/FUNC PROPADZ RADIOL 2PK

## (undated) DEVICE — CATH DIAG IMPULSE FR4 6F 100CM

## (undated) DEVICE — DEV INFL COMPAK W/ACCESSPLUS IN4530

## (undated) DEVICE — BND COMPR ZEPHYR VASC LG

## (undated) DEVICE — DRAPE, RADIAL, STERILE: Brand: MEDLINE

## (undated) DEVICE — GW EMR FIX EXCHG J STD .035 3MM 260CM

## (undated) DEVICE — BALN EUPHORA 3.5X15MM

## (undated) DEVICE — GW RUNTHROUGH NS HYPERCOAT .014 3X180CM

## (undated) DEVICE — HEARTRAIL III GUIDING CATHETER: Brand: HEARTRAIL

## (undated) DEVICE — STPCK 3WY HP ROT

## (undated) DEVICE — CATH DIAG IMPULSE FL4 6F 100CM

## (undated) DEVICE — PINNACLE INTRODUCER SHEATH: Brand: PINNACLE

## (undated) DEVICE — CATH DIAG IMPULSE PIG .056 6F 110CM

## (undated) DEVICE — GW DIAG EMERALD HEPCOAT MOVE JTIP STD .035 3MM 150CM

## (undated) DEVICE — PK TRY HEART CATH 50

## (undated) DEVICE — CONTRST ISOVUE300 61PCT 50ML